# Patient Record
Sex: MALE | Race: WHITE | Employment: FULL TIME | URBAN - METROPOLITAN AREA
[De-identification: names, ages, dates, MRNs, and addresses within clinical notes are randomized per-mention and may not be internally consistent; named-entity substitution may affect disease eponyms.]

---

## 2017-07-22 ENCOUNTER — APPOINTMENT (OUTPATIENT)
Dept: CARDIOLOGY | Facility: CLINIC | Age: 57
DRG: 247 | End: 2017-07-22
Attending: INTERNAL MEDICINE
Payer: COMMERCIAL

## 2017-07-22 ENCOUNTER — HOSPITAL ENCOUNTER (INPATIENT)
Facility: CLINIC | Age: 57
LOS: 1 days | Discharge: HOME | DRG: 247 | End: 2017-07-23
Attending: INTERNAL MEDICINE | Admitting: INTERNAL MEDICINE
Payer: COMMERCIAL

## 2017-07-22 ENCOUNTER — HOSPITAL ENCOUNTER (EMERGENCY)
Facility: CLINIC | Age: 57
Discharge: OTHER INSTITUTION | End: 2017-07-22
Attending: EMERGENCY MEDICINE | Admitting: EMERGENCY MEDICINE
Payer: COMMERCIAL

## 2017-07-22 ENCOUNTER — APPOINTMENT (OUTPATIENT)
Dept: GENERAL RADIOLOGY | Facility: CLINIC | Age: 57
End: 2017-07-22
Attending: EMERGENCY MEDICINE
Payer: COMMERCIAL

## 2017-07-22 VITALS
HEART RATE: 71 BPM | OXYGEN SATURATION: 98 % | SYSTOLIC BLOOD PRESSURE: 149 MMHG | HEIGHT: 74 IN | WEIGHT: 230 LBS | TEMPERATURE: 98.3 F | RESPIRATION RATE: 15 BRPM | DIASTOLIC BLOOD PRESSURE: 102 MMHG | BODY MASS INDEX: 29.52 KG/M2

## 2017-07-22 DIAGNOSIS — I21.3 ST ELEVATION MYOCARDIAL INFARCTION (STEMI), UNSPECIFIED ARTERY (H): ICD-10-CM

## 2017-07-22 DIAGNOSIS — I21.02 ST ELEVATION MYOCARDIAL INFARCTION INVOLVING LEFT ANTERIOR DESCENDING (LAD) CORONARY ARTERY (H): Primary | ICD-10-CM

## 2017-07-22 LAB
ANION GAP SERPL CALCULATED.3IONS-SCNC: 6 MMOL/L (ref 3–14)
APTT PPP: 31 SEC (ref 22–37)
BASOPHILS # BLD AUTO: 0.1 10E9/L (ref 0–0.2)
BASOPHILS NFR BLD AUTO: 1.1 %
BUN SERPL-MCNC: 18 MG/DL (ref 7–30)
CALCIUM SERPL-MCNC: 8.9 MG/DL (ref 8.5–10.1)
CHLORIDE SERPL-SCNC: 106 MMOL/L (ref 94–109)
CHOLEST SERPL-MCNC: 183 MG/DL
CO2 SERPL-SCNC: 26 MMOL/L (ref 20–32)
CREAT SERPL-MCNC: 0.97 MG/DL (ref 0.66–1.25)
DIFFERENTIAL METHOD BLD: ABNORMAL
EOSINOPHIL # BLD AUTO: 0.3 10E9/L (ref 0–0.7)
EOSINOPHIL NFR BLD AUTO: 4.1 %
ERYTHROCYTE [DISTWIDTH] IN BLOOD BY AUTOMATED COUNT: 12.2 % (ref 10–15)
GFR SERPL CREATININE-BSD FRML MDRD: 80 ML/MIN/1.7M2
GLUCOSE SERPL-MCNC: 133 MG/DL (ref 70–99)
HCT VFR BLD AUTO: 50.9 % (ref 40–53)
HDLC SERPL-MCNC: 34 MG/DL
HGB BLD-MCNC: 17.7 G/DL (ref 13.3–17.7)
IMM GRANULOCYTES # BLD: 0 10E9/L (ref 0–0.4)
IMM GRANULOCYTES NFR BLD: 0.3 %
INR PPP: 0.98 (ref 0.86–1.14)
LDLC SERPL CALC-MCNC: 102 MG/DL
LYMPHOCYTES # BLD AUTO: 3 10E9/L (ref 0.8–5.3)
LYMPHOCYTES NFR BLD AUTO: 45 %
MCH RBC QN AUTO: 33.6 PG (ref 26.5–33)
MCHC RBC AUTO-ENTMCNC: 34.8 G/DL (ref 31.5–36.5)
MCV RBC AUTO: 97 FL (ref 78–100)
MONOCYTES # BLD AUTO: 0.8 10E9/L (ref 0–1.3)
MONOCYTES NFR BLD AUTO: 11.7 %
NEUTROPHILS # BLD AUTO: 2.5 10E9/L (ref 1.6–8.3)
NEUTROPHILS NFR BLD AUTO: 37.8 %
NONHDLC SERPL-MCNC: 149 MG/DL
NRBC # BLD AUTO: 0 10*3/UL
NRBC BLD AUTO-RTO: 0 /100
PLATELET # BLD AUTO: 213 10E9/L (ref 150–450)
POTASSIUM SERPL-SCNC: 3.9 MMOL/L (ref 3.4–5.3)
RBC # BLD AUTO: 5.27 10E12/L (ref 4.4–5.9)
SODIUM SERPL-SCNC: 138 MMOL/L (ref 133–144)
TRIGL SERPL-MCNC: 235 MG/DL
TROPONIN I BLD-MCNC: 0.05 UG/L (ref 0–0.1)
TROPONIN I SERPL-MCNC: 0.06 UG/L (ref 0–0.04)
TROPONIN I SERPL-MCNC: 0.33 UG/L (ref 0–0.04)
TROPONIN I SERPL-MCNC: 1.07 UG/L (ref 0–0.04)
WBC # BLD AUTO: 6.6 10E9/L (ref 4–11)

## 2017-07-22 PROCEDURE — 93005 ELECTROCARDIOGRAM TRACING: CPT

## 2017-07-22 PROCEDURE — HZ2ZZZZ DETOXIFICATION SERVICES FOR SUBSTANCE ABUSE TREATMENT: ICD-10-PCS | Performed by: INTERNAL MEDICINE

## 2017-07-22 PROCEDURE — 84484 ASSAY OF TROPONIN QUANT: CPT

## 2017-07-22 PROCEDURE — 27210787 ZZH MANIFOLD CR2

## 2017-07-22 PROCEDURE — 93306 TTE W/DOPPLER COMPLETE: CPT | Mod: 26 | Performed by: INTERNAL MEDICINE

## 2017-07-22 PROCEDURE — 99152 MOD SED SAME PHYS/QHP 5/>YRS: CPT | Mod: 59 | Performed by: INTERNAL MEDICINE

## 2017-07-22 PROCEDURE — 27210795 ZZH PAD DEFIB QUICK CR4

## 2017-07-22 PROCEDURE — 99291 CRITICAL CARE FIRST HOUR: CPT | Mod: 25 | Performed by: INTERNAL MEDICINE

## 2017-07-22 PROCEDURE — 27211089 ZZH KIT ACIST INJECTOR CR3

## 2017-07-22 PROCEDURE — C1769 GUIDE WIRE: HCPCS

## 2017-07-22 PROCEDURE — 80048 BASIC METABOLIC PNL TOTAL CA: CPT | Performed by: EMERGENCY MEDICINE

## 2017-07-22 PROCEDURE — 27210914 ZZH SHEATH CR8

## 2017-07-22 PROCEDURE — 36415 COLL VENOUS BLD VENIPUNCTURE: CPT

## 2017-07-22 PROCEDURE — 99207 ZZC CONSULT E&M CHANGED TO INITIAL LEVEL: CPT | Performed by: INTERNAL MEDICINE

## 2017-07-22 PROCEDURE — 93458 L HRT ARTERY/VENTRICLE ANGIO: CPT

## 2017-07-22 PROCEDURE — 80061 LIPID PANEL: CPT

## 2017-07-22 PROCEDURE — 93458 L HRT ARTERY/VENTRICLE ANGIO: CPT | Mod: 26 | Performed by: INTERNAL MEDICINE

## 2017-07-22 PROCEDURE — 25000132 ZZH RX MED GY IP 250 OP 250 PS 637: Performed by: INTERNAL MEDICINE

## 2017-07-22 PROCEDURE — 85610 PROTHROMBIN TIME: CPT | Performed by: EMERGENCY MEDICINE

## 2017-07-22 PROCEDURE — 25000128 H RX IP 250 OP 636: Performed by: INTERNAL MEDICINE

## 2017-07-22 PROCEDURE — C1874 STENT, COATED/COV W/DEL SYS: HCPCS

## 2017-07-22 PROCEDURE — 21000000 ZZH R&B IMCU HEART CARE

## 2017-07-22 PROCEDURE — 84484 ASSAY OF TROPONIN QUANT: CPT | Performed by: EMERGENCY MEDICINE

## 2017-07-22 PROCEDURE — 99285 EMERGENCY DEPT VISIT HI MDM: CPT

## 2017-07-22 PROCEDURE — 85730 THROMBOPLASTIN TIME PARTIAL: CPT | Performed by: EMERGENCY MEDICINE

## 2017-07-22 PROCEDURE — C9606 PERC D-E COR REVASC W AMI S: HCPCS | Mod: LD

## 2017-07-22 PROCEDURE — 92941 PRQ TRLML REVSC TOT OCCL AMI: CPT | Mod: LD | Performed by: INTERNAL MEDICINE

## 2017-07-22 PROCEDURE — 99153 MOD SED SAME PHYS/QHP EA: CPT

## 2017-07-22 PROCEDURE — 93010 ELECTROCARDIOGRAM REPORT: CPT | Performed by: INTERNAL MEDICINE

## 2017-07-22 PROCEDURE — 27210946 ZZH KIT HC TOTES DISP CR8

## 2017-07-22 PROCEDURE — 36415 COLL VENOUS BLD VENIPUNCTURE: CPT | Performed by: INTERNAL MEDICINE

## 2017-07-22 PROCEDURE — 25000128 H RX IP 250 OP 636

## 2017-07-22 PROCEDURE — 84484 ASSAY OF TROPONIN QUANT: CPT | Performed by: INTERNAL MEDICINE

## 2017-07-22 PROCEDURE — 27210856 ZZH ACCESS HEART CATH CR2

## 2017-07-22 PROCEDURE — 25000125 ZZHC RX 250: Performed by: INTERNAL MEDICINE

## 2017-07-22 PROCEDURE — 4A023N7 MEASUREMENT OF CARDIAC SAMPLING AND PRESSURE, LEFT HEART, PERCUTANEOUS APPROACH: ICD-10-PCS | Performed by: INTERNAL MEDICINE

## 2017-07-22 PROCEDURE — 71010 XR CHEST PORT 1 VW: CPT

## 2017-07-22 PROCEDURE — 25000128 H RX IP 250 OP 636: Performed by: EMERGENCY MEDICINE

## 2017-07-22 PROCEDURE — 99222 1ST HOSP IP/OBS MODERATE 55: CPT | Performed by: INTERNAL MEDICINE

## 2017-07-22 PROCEDURE — 96375 TX/PRO/DX INJ NEW DRUG ADDON: CPT

## 2017-07-22 PROCEDURE — C1725 CATH, TRANSLUMIN NON-LASER: HCPCS

## 2017-07-22 PROCEDURE — 25000132 ZZH RX MED GY IP 250 OP 250 PS 637

## 2017-07-22 PROCEDURE — 25500064 ZZH RX 255 OP 636: Performed by: INTERNAL MEDICINE

## 2017-07-22 PROCEDURE — 99152 MOD SED SAME PHYS/QHP 5/>YRS: CPT

## 2017-07-22 PROCEDURE — C1887 CATHETER, GUIDING: HCPCS

## 2017-07-22 PROCEDURE — 93306 TTE W/DOPPLER COMPLETE: CPT

## 2017-07-22 PROCEDURE — 27210759 ZZH DEVICE INFLATION CR6

## 2017-07-22 PROCEDURE — 85025 COMPLETE CBC W/AUTO DIFF WBC: CPT | Performed by: EMERGENCY MEDICINE

## 2017-07-22 PROCEDURE — B2151ZZ FLUOROSCOPY OF LEFT HEART USING LOW OSMOLAR CONTRAST: ICD-10-PCS | Performed by: INTERNAL MEDICINE

## 2017-07-22 PROCEDURE — 027034Z DILATION OF CORONARY ARTERY, ONE ARTERY WITH DRUG-ELUTING INTRALUMINAL DEVICE, PERCUTANEOUS APPROACH: ICD-10-PCS | Performed by: INTERNAL MEDICINE

## 2017-07-22 PROCEDURE — 96374 THER/PROPH/DIAG INJ IV PUSH: CPT

## 2017-07-22 PROCEDURE — 25000132 ZZH RX MED GY IP 250 OP 250 PS 637: Performed by: EMERGENCY MEDICINE

## 2017-07-22 PROCEDURE — B2111ZZ FLUOROSCOPY OF MULTIPLE CORONARY ARTERIES USING LOW OSMOLAR CONTRAST: ICD-10-PCS | Performed by: INTERNAL MEDICINE

## 2017-07-22 RX ORDER — ASPIRIN 81 MG/1
324 TABLET, CHEWABLE ORAL ONCE
Status: COMPLETED | OUTPATIENT
Start: 2017-07-22 | End: 2017-07-22

## 2017-07-22 RX ORDER — NITROGLYCERIN 5 MG/ML
100-200 VIAL (ML) INTRAVENOUS
Status: DISCONTINUED | OUTPATIENT
Start: 2017-07-22 | End: 2017-07-22 | Stop reason: HOSPADM

## 2017-07-22 RX ORDER — FENTANYL CITRATE 50 UG/ML
25-50 INJECTION, SOLUTION INTRAMUSCULAR; INTRAVENOUS
Status: ACTIVE | OUTPATIENT
Start: 2017-07-22 | End: 2017-07-22

## 2017-07-22 RX ORDER — POTASSIUM CHLORIDE 29.8 MG/ML
20 INJECTION INTRAVENOUS
Status: DISCONTINUED | OUTPATIENT
Start: 2017-07-22 | End: 2017-07-23 | Stop reason: HOSPADM

## 2017-07-22 RX ORDER — ONDANSETRON 2 MG/ML
4 INJECTION INTRAMUSCULAR; INTRAVENOUS EVERY 4 HOURS PRN
Status: DISCONTINUED | OUTPATIENT
Start: 2017-07-22 | End: 2017-07-22 | Stop reason: HOSPADM

## 2017-07-22 RX ORDER — LORAZEPAM 2 MG/ML
.5-2 INJECTION INTRAMUSCULAR EVERY 4 HOURS PRN
Status: DISCONTINUED | OUTPATIENT
Start: 2017-07-22 | End: 2017-07-22 | Stop reason: HOSPADM

## 2017-07-22 RX ORDER — PROTAMINE SULFATE 10 MG/ML
25-100 INJECTION, SOLUTION INTRAVENOUS EVERY 5 MIN PRN
Status: DISCONTINUED | OUTPATIENT
Start: 2017-07-22 | End: 2017-07-22 | Stop reason: HOSPADM

## 2017-07-22 RX ORDER — LANOLIN ALCOHOL/MO/W.PET/CERES
100 CREAM (GRAM) TOPICAL DAILY
Status: DISCONTINUED | OUTPATIENT
Start: 2017-07-22 | End: 2017-07-23 | Stop reason: HOSPADM

## 2017-07-22 RX ORDER — POTASSIUM CHLORIDE 1500 MG/1
20-40 TABLET, EXTENDED RELEASE ORAL
Status: DISCONTINUED | OUTPATIENT
Start: 2017-07-22 | End: 2017-07-23 | Stop reason: HOSPADM

## 2017-07-22 RX ORDER — ASPIRIN 81 MG/1
81 TABLET ORAL DAILY
Status: DISCONTINUED | OUTPATIENT
Start: 2017-07-23 | End: 2017-07-23 | Stop reason: HOSPADM

## 2017-07-22 RX ORDER — CLOPIDOGREL BISULFATE 75 MG/1
300-600 TABLET ORAL
Status: DISCONTINUED | OUTPATIENT
Start: 2017-07-22 | End: 2017-07-22 | Stop reason: HOSPADM

## 2017-07-22 RX ORDER — METOPROLOL TARTRATE 1 MG/ML
5 INJECTION, SOLUTION INTRAVENOUS EVERY 5 MIN PRN
Status: DISCONTINUED | OUTPATIENT
Start: 2017-07-22 | End: 2017-07-22 | Stop reason: HOSPADM

## 2017-07-22 RX ORDER — PROMETHAZINE HYDROCHLORIDE 25 MG/ML
6.25-25 INJECTION, SOLUTION INTRAMUSCULAR; INTRAVENOUS EVERY 4 HOURS PRN
Status: DISCONTINUED | OUTPATIENT
Start: 2017-07-22 | End: 2017-07-22 | Stop reason: HOSPADM

## 2017-07-22 RX ORDER — LORAZEPAM 2 MG/ML
1-2 INJECTION INTRAMUSCULAR EVERY 30 MIN PRN
Status: DISCONTINUED | OUTPATIENT
Start: 2017-07-22 | End: 2017-07-23 | Stop reason: HOSPADM

## 2017-07-22 RX ORDER — IOPAMIDOL 755 MG/ML
180 INJECTION, SOLUTION INTRAVASCULAR ONCE
Status: COMPLETED | OUTPATIENT
Start: 2017-07-22 | End: 2017-07-22

## 2017-07-22 RX ORDER — NIFEDIPINE 10 MG/1
10 CAPSULE ORAL
Status: DISCONTINUED | OUTPATIENT
Start: 2017-07-22 | End: 2017-07-22 | Stop reason: HOSPADM

## 2017-07-22 RX ORDER — METHYLPREDNISOLONE SODIUM SUCCINATE 125 MG/2ML
125 INJECTION, POWDER, LYOPHILIZED, FOR SOLUTION INTRAMUSCULAR; INTRAVENOUS
Status: DISCONTINUED | OUTPATIENT
Start: 2017-07-22 | End: 2017-07-22 | Stop reason: HOSPADM

## 2017-07-22 RX ORDER — QUETIAPINE FUMARATE 25 MG/1
25-50 TABLET, FILM COATED ORAL EVERY 6 HOURS PRN
Status: DISCONTINUED | OUTPATIENT
Start: 2017-07-22 | End: 2017-07-23 | Stop reason: HOSPADM

## 2017-07-22 RX ORDER — ACETAMINOPHEN 325 MG/1
325-650 TABLET ORAL EVERY 4 HOURS PRN
Status: DISCONTINUED | OUTPATIENT
Start: 2017-07-22 | End: 2017-07-23 | Stop reason: HOSPADM

## 2017-07-22 RX ORDER — NITROGLYCERIN 0.4 MG/1
0.4 TABLET SUBLINGUAL EVERY 5 MIN PRN
Status: DISCONTINUED | OUTPATIENT
Start: 2017-07-22 | End: 2017-07-22 | Stop reason: HOSPADM

## 2017-07-22 RX ORDER — NITROGLYCERIN 0.4 MG/1
0.4 TABLET SUBLINGUAL EVERY 5 MIN PRN
Status: DISCONTINUED | OUTPATIENT
Start: 2017-07-22 | End: 2017-07-23 | Stop reason: HOSPADM

## 2017-07-22 RX ORDER — POTASSIUM CHLORIDE 7.45 MG/ML
10 INJECTION INTRAVENOUS
Status: DISCONTINUED | OUTPATIENT
Start: 2017-07-22 | End: 2017-07-23 | Stop reason: HOSPADM

## 2017-07-22 RX ORDER — FLUMAZENIL 0.1 MG/ML
0.2 INJECTION, SOLUTION INTRAVENOUS
Status: ACTIVE | OUTPATIENT
Start: 2017-07-22 | End: 2017-07-22

## 2017-07-22 RX ORDER — HYDRALAZINE HYDROCHLORIDE 20 MG/ML
10-20 INJECTION INTRAMUSCULAR; INTRAVENOUS
Status: DISCONTINUED | OUTPATIENT
Start: 2017-07-22 | End: 2017-07-22 | Stop reason: HOSPADM

## 2017-07-22 RX ORDER — LIDOCAINE 40 MG/G
CREAM TOPICAL
Status: DISCONTINUED | OUTPATIENT
Start: 2017-07-22 | End: 2017-07-23 | Stop reason: HOSPADM

## 2017-07-22 RX ORDER — PROTAMINE SULFATE 10 MG/ML
1-5 INJECTION, SOLUTION INTRAVENOUS
Status: DISCONTINUED | OUTPATIENT
Start: 2017-07-22 | End: 2017-07-22 | Stop reason: HOSPADM

## 2017-07-22 RX ORDER — ATORVASTATIN CALCIUM 80 MG/1
80 TABLET, FILM COATED ORAL AT BEDTIME
Status: DISCONTINUED | OUTPATIENT
Start: 2017-07-22 | End: 2017-07-23 | Stop reason: HOSPADM

## 2017-07-22 RX ORDER — METOPROLOL TARTRATE 25 MG/1
25 TABLET, FILM COATED ORAL 2 TIMES DAILY
Status: DISCONTINUED | OUTPATIENT
Start: 2017-07-22 | End: 2017-07-23 | Stop reason: HOSPADM

## 2017-07-22 RX ORDER — SODIUM CHLORIDE 9 MG/ML
INJECTION, SOLUTION INTRAVENOUS CONTINUOUS
Status: ACTIVE | OUTPATIENT
Start: 2017-07-22 | End: 2017-07-22

## 2017-07-22 RX ORDER — FENTANYL CITRATE 50 UG/ML
25-50 INJECTION, SOLUTION INTRAMUSCULAR; INTRAVENOUS
Status: DISCONTINUED | OUTPATIENT
Start: 2017-07-22 | End: 2017-07-22 | Stop reason: HOSPADM

## 2017-07-22 RX ORDER — POTASSIUM CHLORIDE 29.8 MG/ML
20 INJECTION INTRAVENOUS
Status: DISCONTINUED | OUTPATIENT
Start: 2017-07-22 | End: 2017-07-22 | Stop reason: HOSPADM

## 2017-07-22 RX ORDER — ATROPINE SULFATE 0.1 MG/ML
0.5 INJECTION INTRAVENOUS EVERY 5 MIN PRN
Status: ACTIVE | OUTPATIENT
Start: 2017-07-22 | End: 2017-07-22

## 2017-07-22 RX ORDER — NICARDIPINE HYDROCHLORIDE 2.5 MG/ML
100 INJECTION INTRAVENOUS
Status: DISCONTINUED | OUTPATIENT
Start: 2017-07-22 | End: 2017-07-22 | Stop reason: HOSPADM

## 2017-07-22 RX ORDER — LORAZEPAM 1 MG/1
1-2 TABLET ORAL EVERY 30 MIN PRN
Status: DISCONTINUED | OUTPATIENT
Start: 2017-07-22 | End: 2017-07-23 | Stop reason: HOSPADM

## 2017-07-22 RX ORDER — ATROPINE SULFATE 0.1 MG/ML
.5-1 INJECTION INTRAVENOUS
Status: DISCONTINUED | OUTPATIENT
Start: 2017-07-22 | End: 2017-07-22 | Stop reason: HOSPADM

## 2017-07-22 RX ORDER — HEPARIN SODIUM 1000 [USP'U]/ML
1000-10000 INJECTION, SOLUTION INTRAVENOUS; SUBCUTANEOUS EVERY 5 MIN PRN
Status: DISCONTINUED | OUTPATIENT
Start: 2017-07-22 | End: 2017-07-22 | Stop reason: HOSPADM

## 2017-07-22 RX ORDER — ZOLPIDEM TARTRATE 5 MG/1
5 TABLET ORAL
Status: DISCONTINUED | OUTPATIENT
Start: 2017-07-22 | End: 2017-07-23 | Stop reason: HOSPADM

## 2017-07-22 RX ORDER — LIDOCAINE HYDROCHLORIDE 10 MG/ML
30 INJECTION, SOLUTION EPIDURAL; INFILTRATION; INTRACAUDAL; PERINEURAL
Status: DISCONTINUED | OUTPATIENT
Start: 2017-07-22 | End: 2017-07-22 | Stop reason: HOSPADM

## 2017-07-22 RX ORDER — MULTIPLE VITAMINS W/ MINERALS TAB 9MG-400MCG
1 TAB ORAL DAILY
Status: DISCONTINUED | OUTPATIENT
Start: 2017-07-22 | End: 2017-07-23 | Stop reason: HOSPADM

## 2017-07-22 RX ORDER — NITROGLYCERIN 20 MG/100ML
.07-2 INJECTION INTRAVENOUS CONTINUOUS PRN
Status: DISCONTINUED | OUTPATIENT
Start: 2017-07-22 | End: 2017-07-22 | Stop reason: HOSPADM

## 2017-07-22 RX ORDER — HYDROCODONE BITARTRATE AND ACETAMINOPHEN 5; 325 MG/1; MG/1
1-2 TABLET ORAL EVERY 4 HOURS PRN
Status: DISCONTINUED | OUTPATIENT
Start: 2017-07-22 | End: 2017-07-23 | Stop reason: HOSPADM

## 2017-07-22 RX ORDER — DEXTROSE MONOHYDRATE 25 G/50ML
12.5-5 INJECTION, SOLUTION INTRAVENOUS EVERY 30 MIN PRN
Status: DISCONTINUED | OUTPATIENT
Start: 2017-07-22 | End: 2017-07-22 | Stop reason: HOSPADM

## 2017-07-22 RX ORDER — PHENYLEPHRINE HCL IN 0.9% NACL 1 MG/10 ML
20-100 SYRINGE (ML) INTRAVENOUS
Status: DISCONTINUED | OUTPATIENT
Start: 2017-07-22 | End: 2017-07-22 | Stop reason: HOSPADM

## 2017-07-22 RX ORDER — FUROSEMIDE 10 MG/ML
20-100 INJECTION INTRAMUSCULAR; INTRAVENOUS
Status: DISCONTINUED | OUTPATIENT
Start: 2017-07-22 | End: 2017-07-22 | Stop reason: HOSPADM

## 2017-07-22 RX ORDER — POTASSIUM CHLORIDE 7.45 MG/ML
10 INJECTION INTRAVENOUS
Status: DISCONTINUED | OUTPATIENT
Start: 2017-07-22 | End: 2017-07-22 | Stop reason: HOSPADM

## 2017-07-22 RX ORDER — PRASUGREL 10 MG/1
10-60 TABLET, FILM COATED ORAL
Status: DISCONTINUED | OUTPATIENT
Start: 2017-07-22 | End: 2017-07-22 | Stop reason: HOSPADM

## 2017-07-22 RX ORDER — EPTIFIBATIDE 2 MG/ML
180 INJECTION, SOLUTION INTRAVENOUS EVERY 10 MIN PRN
Status: DISCONTINUED | OUTPATIENT
Start: 2017-07-22 | End: 2017-07-22 | Stop reason: HOSPADM

## 2017-07-22 RX ORDER — ASPIRIN 81 MG/1
81-324 TABLET, CHEWABLE ORAL
Status: DISCONTINUED | OUTPATIENT
Start: 2017-07-22 | End: 2017-07-22 | Stop reason: HOSPADM

## 2017-07-22 RX ORDER — NITROGLYCERIN 5 MG/ML
100-500 VIAL (ML) INTRAVENOUS
Status: COMPLETED | OUTPATIENT
Start: 2017-07-22 | End: 2017-07-22

## 2017-07-22 RX ORDER — POTASSIUM CHLORIDE 1.5 G/1.58G
20-40 POWDER, FOR SOLUTION ORAL
Status: DISCONTINUED | OUTPATIENT
Start: 2017-07-22 | End: 2017-07-23 | Stop reason: HOSPADM

## 2017-07-22 RX ORDER — POTASSIUM CL/LIDO/0.9 % NACL 10MEQ/0.1L
10 INTRAVENOUS SOLUTION, PIGGYBACK (ML) INTRAVENOUS
Status: DISCONTINUED | OUTPATIENT
Start: 2017-07-22 | End: 2017-07-23 | Stop reason: HOSPADM

## 2017-07-22 RX ORDER — FOLIC ACID 1 MG/1
1 TABLET ORAL DAILY
Status: DISCONTINUED | OUTPATIENT
Start: 2017-07-22 | End: 2017-07-23 | Stop reason: HOSPADM

## 2017-07-22 RX ORDER — NALOXONE HYDROCHLORIDE 0.4 MG/ML
.1-.4 INJECTION, SOLUTION INTRAMUSCULAR; INTRAVENOUS; SUBCUTANEOUS
Status: DISCONTINUED | OUTPATIENT
Start: 2017-07-22 | End: 2017-07-23 | Stop reason: HOSPADM

## 2017-07-22 RX ORDER — EPTIFIBATIDE 2 MG/ML
1 INJECTION, SOLUTION INTRAVENOUS CONTINUOUS PRN
Status: DISCONTINUED | OUTPATIENT
Start: 2017-07-22 | End: 2017-07-22 | Stop reason: HOSPADM

## 2017-07-22 RX ORDER — NALOXONE HYDROCHLORIDE 0.4 MG/ML
.2-.4 INJECTION, SOLUTION INTRAMUSCULAR; INTRAVENOUS; SUBCUTANEOUS
Status: ACTIVE | OUTPATIENT
Start: 2017-07-22 | End: 2017-07-22

## 2017-07-22 RX ORDER — ADENOSINE 3 MG/ML
12-12000 INJECTION, SOLUTION INTRAVENOUS
Status: DISCONTINUED | OUTPATIENT
Start: 2017-07-22 | End: 2017-07-22 | Stop reason: HOSPADM

## 2017-07-22 RX ORDER — ENALAPRILAT 1.25 MG/ML
1.25-2.5 INJECTION INTRAVENOUS
Status: DISCONTINUED | OUTPATIENT
Start: 2017-07-22 | End: 2017-07-22 | Stop reason: HOSPADM

## 2017-07-22 RX ORDER — NITROGLYCERIN 20 MG/100ML
.07-2 INJECTION INTRAVENOUS CONTINUOUS
Status: DISCONTINUED | OUTPATIENT
Start: 2017-07-22 | End: 2017-07-22 | Stop reason: HOSPADM

## 2017-07-22 RX ORDER — DIPHENHYDRAMINE HYDROCHLORIDE 50 MG/ML
25-50 INJECTION INTRAMUSCULAR; INTRAVENOUS
Status: DISCONTINUED | OUTPATIENT
Start: 2017-07-22 | End: 2017-07-22 | Stop reason: HOSPADM

## 2017-07-22 RX ORDER — CLOPIDOGREL BISULFATE 75 MG/1
75 TABLET ORAL
Status: DISCONTINUED | OUTPATIENT
Start: 2017-07-22 | End: 2017-07-22 | Stop reason: HOSPADM

## 2017-07-22 RX ORDER — NITROGLYCERIN 20 MG/100ML
INJECTION INTRAVENOUS
Status: COMPLETED
Start: 2017-07-22 | End: 2017-07-22

## 2017-07-22 RX ORDER — SODIUM NITROPRUSSIDE 25 MG/ML
100-200 INJECTION INTRAVENOUS
Status: DISCONTINUED | OUTPATIENT
Start: 2017-07-22 | End: 2017-07-22 | Stop reason: HOSPADM

## 2017-07-22 RX ORDER — FLUMAZENIL 0.1 MG/ML
0.2 INJECTION, SOLUTION INTRAVENOUS
Status: DISCONTINUED | OUTPATIENT
Start: 2017-07-22 | End: 2017-07-22 | Stop reason: HOSPADM

## 2017-07-22 RX ORDER — NALOXONE HYDROCHLORIDE 0.4 MG/ML
0.4 INJECTION, SOLUTION INTRAMUSCULAR; INTRAVENOUS; SUBCUTANEOUS EVERY 5 MIN PRN
Status: DISCONTINUED | OUTPATIENT
Start: 2017-07-22 | End: 2017-07-22 | Stop reason: HOSPADM

## 2017-07-22 RX ORDER — DOPAMINE HYDROCHLORIDE 160 MG/100ML
2-20 INJECTION, SOLUTION INTRAVENOUS CONTINUOUS PRN
Status: DISCONTINUED | OUTPATIENT
Start: 2017-07-22 | End: 2017-07-22 | Stop reason: HOSPADM

## 2017-07-22 RX ORDER — LIDOCAINE HYDROCHLORIDE 10 MG/ML
1-10 INJECTION, SOLUTION EPIDURAL; INFILTRATION; INTRACAUDAL; PERINEURAL
Status: COMPLETED | OUTPATIENT
Start: 2017-07-22 | End: 2017-07-22

## 2017-07-22 RX ORDER — LISINOPRIL 5 MG/1
5 TABLET ORAL DAILY
Status: DISCONTINUED | OUTPATIENT
Start: 2017-07-22 | End: 2017-07-23 | Stop reason: HOSPADM

## 2017-07-22 RX ORDER — VERAPAMIL HYDROCHLORIDE 2.5 MG/ML
1-2.5 INJECTION, SOLUTION INTRAVENOUS
Status: COMPLETED | OUTPATIENT
Start: 2017-07-22 | End: 2017-07-22

## 2017-07-22 RX ORDER — DOBUTAMINE HYDROCHLORIDE 200 MG/100ML
2-20 INJECTION INTRAVENOUS CONTINUOUS PRN
Status: DISCONTINUED | OUTPATIENT
Start: 2017-07-22 | End: 2017-07-22 | Stop reason: HOSPADM

## 2017-07-22 RX ADMIN — FENTANYL CITRATE 50 MCG: 50 INJECTION, SOLUTION INTRAMUSCULAR; INTRAVENOUS at 08:10

## 2017-07-22 RX ADMIN — MIDAZOLAM HYDROCHLORIDE 1 MG: 1 INJECTION, SOLUTION INTRAMUSCULAR; INTRAVENOUS at 08:16

## 2017-07-22 RX ADMIN — ASPIRIN 81 MG 324 MG: 81 TABLET ORAL at 07:23

## 2017-07-22 RX ADMIN — FENTANYL CITRATE 50 MCG: 50 INJECTION, SOLUTION INTRAMUSCULAR; INTRAVENOUS at 08:24

## 2017-07-22 RX ADMIN — LIDOCAINE HYDROCHLORIDE 10 MG: 10 INJECTION, SOLUTION EPIDURAL; INFILTRATION; INTRACAUDAL; PERINEURAL at 08:16

## 2017-07-22 RX ADMIN — HEPARIN SODIUM 7500 UNITS: 1000 INJECTION, SOLUTION INTRAVENOUS; SUBCUTANEOUS at 07:35

## 2017-07-22 RX ADMIN — SULFUR HEXAFLUORIDE 5 ML: KIT at 11:21

## 2017-07-22 RX ADMIN — NITROGLYCERIN 200 MCG: 5 INJECTION, SOLUTION INTRAVENOUS at 08:17

## 2017-07-22 RX ADMIN — IOPAMIDOL 180 ML: 755 INJECTION, SOLUTION INTRAVASCULAR at 16:15

## 2017-07-22 RX ADMIN — LISINOPRIL 5 MG: 5 TABLET ORAL at 12:47

## 2017-07-22 RX ADMIN — ATORVASTATIN CALCIUM 80 MG: 80 TABLET, FILM COATED ORAL at 20:19

## 2017-07-22 RX ADMIN — METOPROLOL TARTRATE 25 MG: 25 TABLET ORAL at 20:07

## 2017-07-22 RX ADMIN — BIVALIRUDIN 1.75 MG/KG/HR: 250 INJECTION, POWDER, LYOPHILIZED, FOR SOLUTION INTRAVENOUS at 08:23

## 2017-07-22 RX ADMIN — MIDAZOLAM HYDROCHLORIDE 1 MG: 1 INJECTION, SOLUTION INTRAMUSCULAR; INTRAVENOUS at 08:24

## 2017-07-22 RX ADMIN — NITROGLYCERIN 0.4 MG: 0.4 TABLET SUBLINGUAL at 07:22

## 2017-07-22 RX ADMIN — NITROGLYCERIN 50000 MCG: 20 INJECTION INTRAVENOUS at 07:30

## 2017-07-22 RX ADMIN — NITROGLYCERIN 200 MCG: 5 INJECTION, SOLUTION INTRAVENOUS at 08:40

## 2017-07-22 RX ADMIN — TICAGRELOR 180 MG: 90 TABLET ORAL at 07:26

## 2017-07-22 RX ADMIN — MIDAZOLAM HYDROCHLORIDE 1 MG: 1 INJECTION, SOLUTION INTRAMUSCULAR; INTRAVENOUS at 08:10

## 2017-07-22 RX ADMIN — TICAGRELOR 90 MG: 90 TABLET ORAL at 20:07

## 2017-07-22 RX ADMIN — METOPROLOL TARTRATE 25 MG: 25 TABLET ORAL at 12:47

## 2017-07-22 RX ADMIN — FENTANYL CITRATE 50 MCG: 50 INJECTION, SOLUTION INTRAMUSCULAR; INTRAVENOUS at 08:20

## 2017-07-22 RX ADMIN — Medication 78.2 MG: at 08:23

## 2017-07-22 RX ADMIN — VERAPAMIL HYDROCHLORIDE 2.5 MG: 2.5 INJECTION, SOLUTION INTRAVENOUS at 08:17

## 2017-07-22 RX ADMIN — FENTANYL CITRATE 50 MCG: 50 INJECTION, SOLUTION INTRAMUSCULAR; INTRAVENOUS at 08:16

## 2017-07-22 RX ADMIN — NITROGLYCERIN 200 MCG: 5 INJECTION, SOLUTION INTRAVENOUS at 08:28

## 2017-07-22 RX ADMIN — ZOLPIDEM TARTRATE 5 MG: 5 TABLET, FILM COATED ORAL at 20:19

## 2017-07-22 RX ADMIN — FOLIC ACID 1 MG: 1 TABLET ORAL at 12:47

## 2017-07-22 RX ADMIN — Medication 100 MG: at 12:47

## 2017-07-22 RX ADMIN — NITROGLYCERIN 0.4 MG: 0.4 TABLET SUBLINGUAL at 07:27

## 2017-07-22 RX ADMIN — MULTIPLE VITAMINS W/ MINERALS TAB 1 TABLET: TAB at 12:46

## 2017-07-22 ASSESSMENT — ENCOUNTER SYMPTOMS: SHORTNESS OF BREATH: 1

## 2017-07-22 NOTE — PHARMACY-ADMISSION MEDICATION HISTORY
Admission medication history interview status for the 7/22/2017  admission is complete. See EPIC admission navigator for prior to admission medications     Medication history source reliability:Good    Actions taken by pharmacist (provider contacted, etc): interview with patient.     Additional medication history information not noted on PTA med list :None    Medication reconciliation/reorder completed by provider prior to medication history? No    Time spent in this activity: 10 min    Prior to Admission medications    Medication Sig Last Dose Taking? Auth Provider   Omeprazole Magnesium (PRILOSEC OTC PO) Take 20 mg by mouth daily as needed prn Yes Unknown, Entered By History   DiphenhydrAMINE HCl, Sleep, (SIMPLY SLEEP PO) Take 25 mg by mouth nightly as needed  prn  Reported, Patient

## 2017-07-22 NOTE — PROCEDURES
PROCEDURES PERFORMED:   Coronary Angiography  Percutaneous Coronary Intervention    PHYSICIANS:  Attending Physician: Brandon Hurley MD  Interventional Cardiology Fellow: OSMIN Monzon MD, PhD    INDICATION:  Mr. Tipton is a 57 year old male with hx of tobacco use, HLD, MI in father at age 70 and vague hx of alcohol use. He presents today with STEMI, transferred from [facility]. Patient received ASA, Heparin and Brilinta at the [facility].    DESCRIPTION:  1. Consent obtained with discussion of risks.  All questions were answered.  2. Sterile prep and procedure.  3. Location with Sheaths:   Lf Radial Arterial  6 Fr slender  4. Access: Local anesthetic with lidocaine.  A micropuncture 21 guage needle with ultrasound guidance was used to establish vascular access using a modified Seldinger technique.  5. Catheters:   6 Fr  JR4 and EBU 3.5 guide  6. Estimated blood loss: < 25 ml    The procedure was performed under conscious sedation for 53 minutes from 0810 to 0903.  The patient was assessed immediately before the first sedation medication was administered.  Midazolam 3 mg and Fentanyl 200 mcg were administered.  Heart rate, BP, respiration, oxygen saturation and patient responses were monitored throughout the procedure with the assistance of the RN under my supervision.  IA Verapamil and IA NTG were administered to prophylax against radial artery spasm.    CORONARY ANGIOGRAM:   Both coronary arteries arise from their respective cusps.  Dominance: Right  The LM has no evidence of coronary artery disease.  LAD: Type 3 [LAD supplies the entire apex]. The LAD gives rise to septal perforators, D1 and D2. There is an acute subtotal, thrombotic occlusion of the mid LAD with CHAYA 0 flow distally. D2 has an ostial stenosis of about 60%  LCX gives rise to large branching OM, and two LPL branches. There is no evidence of disease.   RCA (dominant) gives rise to small PL branches and supplies PDA. There are mild luminal  irregularties in the mid RCA. Otherwise no evidence of disease.    PERCUTANEOUS CORONARY INTERVENTION:  Mid LAD  EBU 3.5 guide  Bivalirudin for anticoagulation    A Couger wire was advanced across the lesion and positioned in the apical LAD.  A 2.92q08ny Emerge balloon was used to pre-dilate the lesion which provided CHAYA 3 flow down the vessel.  A 3.0x38mm Promus Premier drug eluting stent was successfully deployed across the lesion with inflation to 11 nikki.  The stent was post-dilated with the stent balloon to 14 nikki.  Final angiography showed no evidence of perforation or dissection with residual stenosis of 0% and CHAYA 3 flow.  No complications.    Left Heart Catheterization:  AV crossed with pigtail catheter.   LV pressure 118/20  EF 60-65%  No wall motion abnormalties  No mitral regurgitation    Sheath Removal:  The LRA sheath was manually removed in the cardiac catheterization laboratory, TR band placed.    Contrast: Isovue, 180 ml     Fluoroscopy Time: 16 min    COMPLICATIONS:  1. None    SUMMARY:   Mid LAD STEMI with acute thrombotic lesion  Successfully opened with 3.0x38 CINDY    PLAN:   ASA 81mg daily and ticagrelor 90mg BID for at least one year  Initiated atorvastatin 80mg, metoprolol and lisinopril  Echo tomorrow  Bedrest per protocol.  Continued medical management and lifestyle modification for cardiovascular risk factor optimization.     See CVIS report for final draft.    OSMIN Monzon MD, PhD   Cardiology Fellow    I was present throughout the procedure and assisted as necessary.    Brandon Hurley MD [facility]

## 2017-07-22 NOTE — IP AVS SNAPSHOT
[facility]    [address]    [facility] [address]    Phone:  [phone]                                       After Visit Summary   7/22/2017    Bowen Tipton    MRN: [id number]           After Visit Summary Signature Page     I have received my discharge instructions, and my questions have been answered. I have discussed any challenges I see with this plan with the nurse or doctor.    ..........................................................................................................................................  Patient/Patient Representative Signature      ..........................................................................................................................................  Patient Representative Print Name and Relationship to Patient    ..................................................               ................................................  Date                                            Time    ..........................................................................................................................................  Reviewed by Signature/Title    ...................................................              ..............................................  Date                                                            Time

## 2017-07-22 NOTE — ED NOTES
"Patient presents to the ED reporting chest pressure that awoke him from sleep 30 minutes ago. States \"I feel really bad.\" Reports nausea along with midsternal chest pressure. Denies MI history, but reports a problem \"where the chambers of my heart weren't beating at the same time.\"   "

## 2017-07-22 NOTE — PLAN OF CARE
Problem: Goal Outcome Summary  Goal: Goal Outcome Summary  Outcome: No Change  Pt admitted this afternoon for STEMI post cath lab with stent placed to prox LAD. Lt radial remains CDI with no bleeding or hematoma noted. VSS. Tele shows SB. Pt denies any chest pain or SOB. Plan is for cardiac rehab tomorrow and possible D/C in 1-2 days. Bed alarm on. Will continue to monitor pt.

## 2017-07-22 NOTE — H&P
[facility]  Cardiology     Date of Admission:  7/22/2017    Primary Care Physician   Lisette Santos      History of Present Illness   Bowen Tipton is a 57 year old gentleman with past medical history significant for ongoing tobacco abuse, EtOH abuse, hypercholesterolemia, and a family history of coronary artery disease. This morning at approximately 645 he developed the onset of a substernal chest discomfort radiating into his left arm accompanied by diaphoresis shortness of breath and nausea. He immediately realized something was wrong and went to the emergency room. There EKG was consistent with an acute anterior wall ST segment elevation myocardial infarction. STEMI protocol was initiated. Patient has never had similar symptoms in the past.    Patient states he smokes an intermittent fashion some days no cigarettes some days one pack. He thinks he has hypercholesterolemia and is on no medications. He clearly has a very poor diet. He also is evasive about talked about how much alcohol he drinks he just answers to much. He drinks predominantly whiskey. His family history is significant cyst father dying suddenly just before 70 of birthday. He reports a sister having multiple stents in her 40s.    Assessment & Plan   Bowen Tipton is a 57 year old male who was admitted on 7/22/2017 with an anterior ST segment elevation myocardial infarction. Patient has been treated with aspirin, Brilinta, heparin. We'll proceed with emergent angioplasty and stenting as indicated. I quickly discussed risks benefits and alternatives the patient. He appears to understand and desires to proceed. I'll check serial troponins and echocardiogram in the morning.    I will send off a fasting lipid profile and empirically start him on atorvastatin 40 mg daily.    I will start beta blockers and Ace inhibitors as tolerated.    I will consult the hospitalist to help us manage and watch for possible alcohol withdrawal.  Next    Patient has been admonished to stop smoking cigarettes altogether.    I'll have cardiac rehabilitation see the patient.    Further evaluation treatment will depend upon the above results.    30 minutes of critical care time was spent in coordinating transfer from the [facility] emergency room. Also coordinating care with the cardiac intensive care unit and the hospitalist.        Brandon Hurley MD    Patient Active Problem List   Diagnosis     Hyperlipidemia with target LDL less than 130     Persistent insomnia       Past Medical History   I have reviewed this patient's medical history and updated it with pertinent information if needed.   No past medical history on file.    Past Surgical History   I have reviewed this patient's surgical history and updated it with pertinent information if needed.  No past surgical history on file.    Prior to Admission Medications   Prior to Admission Medications   Prescriptions Last Dose Informant Patient Reported? Taking?   DiphenhydrAMINE HCl, Sleep, (SIMPLY SLEEP PO)   Yes No      Facility-Administered Medications: None         Allergies   Allergies   Allergen Reactions     Codeine      reaction       Social History    reports that he has been smoking Cigarettes.  He has never used smokeless tobacco. He reports that he drinks alcohol. He reports that he does not use illicit drugs.    Family History   Family History   Problem Relation Age of Onset     Colon Cancer Mother      C.A.D. Mother      liver     Myocardial Infarction Father      Heart Failure Father        Review of Systems   The comprehensive 10 point Review of Systems is negative other than noted in the HPI or here.     Physical Exam   Vital Signs with Ranges  Temp:  [97.7  F (36.5  C)-98.3  F (36.8  C)] 97.7  F (36.5  C)  Pulse:  [71] 71  Heart Rate:  [57-77] 57  Resp:  [15-26] 19  BP: (122-180)/() 122/74  SpO2:  [90 %-98 %] 96 %  Wt Readings from Last 4 Encounters:   07/22/17 104.3 kg (230 lb)    12/29/14 108 kg (238 lb)            Vitals: /74  Temp 97.7  F (36.5  C) (Oral)  Resp 19  SpO2 96%    General:  Patient comfortable, in no apparent distress.  Awake, alert, oriented x3  Eyes: Sclera is anicteric. Conjunctiva is not injected. Pupils are equal and round..  Neck:  No JVD, no carotid bruits.  Back: No kyphosis or scoliosis.  Lungs:  Clear to auscultation bilaterally.  Cardiac:  Regular rate and rhythm, no murmurs, rub, or gallops.  Abdomen:  Soft, nontender.  Extremities:  Without edema.  2+ pulses.  Skin:  Warm, dry.  Neurologic:  No focal deficits.      Recent Labs  Lab 07/22/17  0722   TROPI 0.060*         Recent Labs  Lab 07/22/17 0722   WBC 6.6   HGB 17.7   MCV 97      INR 0.98      POTASSIUM 3.9   CHLORIDE 106   CO2 26   BUN 18   CR 0.97   GFRESTIMATED 80   GFRESTBLACK >90African American GFR Calc   ANIONGAP 6   LEOLA 8.9   *   TROPI 0.060*     No results for input(s): CHOL, HDL, LDL, TRIG, CHOLHDLRATIO in the last 92878 hours.    Recent Labs  Lab 07/22/17 0722   WBC 6.6   HGB 17.7   HCT 50.9   MCV 97        No results for input(s): PH, PHV, PO2, PO2V, SAT, PCO2, PCO2V, HCO3, HCO3V in the last 168 hours.  No results for input(s): NTBNPI, NTBNP in the last 168 hours.  No results for input(s): DD in the last 168 hours.  No results for input(s): SED, CRP in the last 168 hours.    Recent Labs  Lab 07/22/17  0722        No results for input(s): TSH in the last 168 hours.  No results for input(s): COLOR, APPEARANCE, URINEGLC, URINEBILI, URINEKETONE, SG, UBLD, URINEPH, PROTEIN, UROBILINOGEN, NITRITE, LEUKEST, RBCU, WBCU in the last 168 hours.    Imaging:  Recent Results (from the past 48 hour(s))   XR Chest Port 1 View    Narrative    XR CHEST PORT 1 VW 7/22/2017 7:31 AM    COMPARISON: None.    HISTORY: Chest pain      Impression    IMPRESSION: Cardiac silhouette and pulmonary vasculature are within  normal limits. No focal airspace disease, pleural effusion  or  pneumothorax. There appears to be an old healed left eighth rib  fracture.    JORGE REYES       Echo:  No results found for this or any previous visit (from the past 4320 hour(s)).

## 2017-07-22 NOTE — ED PROVIDER NOTES
"  History     Chief Complaint:  Chest Pain & Shortness of Breath    The history is provided by the patient.      Bowen Tipton is a 57 year old male with a history of anxiety attacks who presents to the emergency department today for evaluation of chest pain and shortness of breath and is accompanied by his wife. The patient reports substernal chest pain beginning about 30 minutes ago. The pain is a pressure, constant and non-radiating. There are no alleviating or aggravating factors to his pain. He is dyspneic but denies nausea.  He has no history of CAD. He denies any illegal drug use or personal medical history, but has not been following regularly with a doctor.     CARDIAC RISK FACTORS:  Sex:    M  Tobacco:   Some day smoker  Hypertension:   -  Hyperlipidemia:  +  Diabetes:   -  Family History:  +    PE/DVT RISK FACTORS:  Sex:    Male  Hormones:   -  Tobacco:   +  Cancer:   -  Travel:   -  Surgery:   -  Other immobilization: -  Personal history:  -  Family history:  +     Allergies:  Codeine     Medications:    Ambien    Past Medical History:    HLD  Insomnia     Past Surgical History:    History reviewed. No pertinent surgical history.     Family History:    Colon Cancer  CAD  MI  CHF    Social History:  Tobacco use: Current some day cigarette smoker  Alcohol use: Social  PCP: Lisette Santos    Marital Status:      Review of Systems   Respiratory: Positive for shortness of breath.    Cardiovascular: Positive for chest pain.   All other systems reviewed and are negative.    Physical Exam     Patient Vitals for the past 24 hrs:   BP Temp Pulse Heart Rate Resp SpO2 Height Weight   07/22/17 0732 - - - 71 15 98 % - -   07/22/17 0730 - - - 75 26 96 % - -   07/22/17 0729 (!) 149/102 - - - - - - -   07/22/17 0721 (!) 156/143 - - 77 24 90 % - -   07/22/17 0716 (!) 180/112 98.3  F (36.8  C) 71 - 24 98 % 1.88 m (6' 2\") 104.3 kg (230 lb)   07/22/17 0713 (!) 180/112 - - - - - - -      Physical " Exam    General:   Pleasant, age appropriate ill appearing  male.  HEENT:    Oropharynx is moist, without lesions or trismus.  Eyes:    Conjunctiva normal  Neck:    Supple, no meningismus.     CV:     Regular rate and rhythm.      No murmurs, rubs or gallops.       No JVD or unilateral leg swelling.       2+ radial pulses bilateral.       No lower extremity edema.  PULM:    Clear to auscultation bilateral.       Tachypneic     No respiratory distress.      Good air exchange.     No rales or wheezing.  ABD:    Soft, non-tender, non-distended.       No pulsatile masses.       No rebound, guarding or rigidity.  MSK:     No gross deformity to all four extremities.   LYMPH:   No cervical lymphadenopathy.  NEURO:   Alert. Good muscle tone, no atrophy.  Skin:    Diaphoretic and intact.    Psych:    Anxious        Emergency Department Course     ECG:  ECG taken at 0715, ECG read at 0715  Sinus rhythm  ST elevation, consider early repolarization, pericarditis, or injury  Abnormal ECG  Rate 71 bpm. MS interval 138. QRS duration 98. QT/QTc 414/449. P-R-T axes 68 25 -4.    ECG taken at 0718, ECG read at 0718  Sinus rhythm  ST elevation consider anterior injury or acute infarct  ACUTE MI / STEMI  Rate 69 bpm. MS interval 148. QRS duration 94. QT/QTc 404/432. P-R-T axes 64 31 -2.    Imaging:  Radiology findings were communicated with the admitting MD who voiced understanding of the findings.    XR Chest 1 view portable  Cardiac silhouette and pulmonary vasculature are within  normal limits. No focal airspace disease, pleural effusion or  pneumothorax. There appears to be an old healed left eighth rib  Fracture.  Reading per radiology    Laboratory:  Laboratory findings were communicated with the Admitting MD who voiced understanding of the findings.    CBC: WNL. (WBC 6.6, HGB 17.7, )   BMP: Glucose 133 (H) o/w WNL (Creatinine 0.97)  INR: 0.98  PTT: 31  Troponin POCT (Collected 0725): 0.05  Troponin (Collected  0722): 0.060 (H)    Interventions:  See Emergency Department Course below.    Emergency Department Course:  Nursing notes and vitals reviewed.  I performed an exam of the patient as documented above.   0714: Doctor entered the room  0715: EKG completed  0718: EKG completed  0719: I spoke with Dr. Hurley of the cardiology service from [facility] regarding patient's presentation, findings, and plan of care, who accepted the patient for transfer.  0722: nitroglycerin 0.4 mg sublingual  0723: aspirin 324 mg oral  0725: Chest xray done  0726: Patient notes chest pain has improved slightly after nitroglycerin.  0726: Brilinta 180 mg oral  0727: nitroglycerin 0.4 mg sublingual  0730: nitroglycerin 50 mg IV  0731: EMS arrival  0735: heparin 7,500 units IV  0739: Patient left  I discussed the treatment plan with the patient. They expressed understanding of this plan and consented to admission. I discussed the patient with Dr. Hurley of cardiology at [facility] who will accept the patient for transfer.    Impression & Plan      Medical Decision Making:  Bowen Tipton is a 57 year old male who presents to the emergency department with chest pain and shortness of breath. Patient was ill appearing on examination. EKG showed anterior wall MI with ST elevation in V2-4 and to a lesser degree, in I and avL. There was a reciprocal depression in inferior leads. The patient was given aspirin, heparin, and Brilinta. He was initially started with sublingual nitroglycerin and transitioned to IV nitroglycerin in which pain was improved but still present. He has no suggestive of STEMI mimics. He will be transferred to [facility] given the availability of cardiac catheterization. Dr. Hurley has graciously accepted the patient.    Critical Care time was 30 minutes for this patient excluding procedures.     Diagnosis:    ICD-10-CM    1. ST elevation myocardial infarction (STEMI), unspecified artery (H) I21.3 CBC  with platelets differential     INR     Basic metabolic panel     Troponin I     Partial thromboplastin time     Troponin POCT     Troponin POCT     Disposition:   Transfer to [facility] via EMS    Scribe Disclosure:  I, Jacob Shaw, am serving as a scribe at 7:19 AM on 7/22/2017 to document services personally performed by Saul Monteiro MD, based on my observations and the provider's statements to me.    7/22/2017   [facility] EMERGENCY DEPARTMENT       Saul Monteiro MD  07/22/17 105

## 2017-07-22 NOTE — CONSULTS
[facility]    Hospitalist Consultation    Date of Admission:  7/22/2017  Date of Consult (When I saw the patient): 07/22/17    Assessment & Plan   Bowen Tipton is a 57 year old male who was admitted on 7/22/2017. I was asked to see the patient for assistance with medical issues as well as monitoring for alcohol withdrawal.    ST elevation MI  Mr. Tipton presents to the emergency department this morning after not feeling well.  He somewhat non-specific with his symptoms but he had chest pressure.  At the time presentation to the emergency department he was on and have ST elevation on his EKG.  He got the Cath Lab and had stent placed in his LAD.  He is currently post-angiogram is doing well.  - Further management as per cardiology.  - We'll need dual antiplatelet therapy likely with aspirin and fell into for at least a year  - We'll defer to cardiology regarding statin as well as beta blocker, especially given his low heart rate  - Absolute cessation from tobacco abuse was stressed to the patient.  - will have routine lipids checked  - Check a hemoglobin A1c  - Patient reports regular physicals and states that his blood pressures now under excellent control    Alcohol use  Mr. Tipton admits to using at least 1 L of heavy alcohol weekly.  He states some days he won't have any drinks and some days are 3-4 drinks.  - We'll place him on the CIWA protocol and monitor for withdrawal  - We'll start him on thiamine and folate    History of supraventricular tachycardia   Mr. Tipton describes in the past having cardioversion.  Appears this was for an SVT as described by the patient.  There are no acute issues suspected this at this point     Question of hyperlipidemia  We'll have lipids checked and will be likely placed on a statin, I'll defer to cardiology.    DVT Prophylaxis: Defer to primary service  Code Status: No Order    Disposition: Expected discharge as per cardiology    Janes Eugene,  MD  [phone] (P)  [phone] (C)  Text Page before 6 pm (after call answering service)    Reason for Consult   Reason for consult: I was asked by Dr. Hurley to evaluate this patient for medical management and concern for potential alcohol withdrawal in pt with STEMI.    Primary Care Physician   Dr. Lisette Santos    Chief Complaint   Chest pressure     History is obtained from the patient and medical records.    History of Present Illness   Bowen Tipton is a 57 year old male who presents with chest pressure.  The patient reports waking up this morning and not feeling well.  He is somewhat nonspecific about how he is not feeling well but does state that he had chest pressure.  Has some mild nausea associated with this.  He also had some shortness of breath associated with this.  He also was diaphoretic.  He said it felt similar to his previous episode of supraventricular tachycardia.  Previously he was told to go to the bathroom and tried a vasovagal himself out of an SVT so  attempted this.  However he did not feel better after doing this he promises with two transferred to the emergency department.  Her presentation emergency department an EKG was done.  It demonstrated ST elevation myocardial infarction.  His initial troponin was 0.060.  He was promptly brought to the cath lab and had stenting done to his left anterior descending artery.  I'm seeing him after procedure.  The time of my visit he is doing well.  Denies any chest pain or shortness of breath at this time.  Denies abdominal pain or other complaints on review of systems.      Past Medical History    SVT  ? Hyperlipidemia  Insomnia  Alcohol use/ abuse    Past Surgical History   I have reviewed this patient's surgical history and updated it with pertinent information if needed.  No past surgical history on file.    Prior to Admission Medications   Prior to Admission Medications   Prescriptions Last Dose Informant Patient Reported? Taking?    DiphenhydrAMINE HCl, Sleep, (SIMPLY SLEEP PO)   Yes No      Facility-Administered Medications: None     Allergies   Allergies   Allergen Reactions     Codeine      reaction       Social History   I have reviewed this patient's social history and updated it with pertinent information if needed. Bowen Tipton  reports that he has been smoking Cigarettes.  He has never used smokeless tobacco. He reports that he drinks alcohol. He reports that he does not use illicit drugs. he states he consumes ~1L heavy alcohol per week.     Family History   I have reviewed this patient's family history and updated it with pertinent information if needed.   Family History   Problem Relation Age of Onset     Colon Cancer Mother      C.A.D. Mother      liver     Myocardial Infarction Father      Heart Failure Father        Review of Systems   The 10 point Review of Systems is negative other than noted in the HPI or here.     Physical Exam   Temp: 97.7  F (36.5  C) Temp src: Oral BP: 122/74   Heart Rate: 57 Resp: 19 SpO2: 96 % O2 Device: None (Room air)    Vital Signs with Ranges  Temp:  [97.7  F (36.5  C)-98.3  F (36.8  C)] 97.7  F (36.5  C)  Pulse:  [71] 71  Heart Rate:  [57-77] 57  Resp:  [15-26] 19  BP: (122-180)/() 122/74  SpO2:  [90 %-98 %] 96 %  0 lbs 0 oz    Cons he  is alert and oriented no acute distress  Eyes  EOMI  HEENT: OP clear, neck supple   Respiratory: lungs CTA B  Cardiovascular: RRR no m/r/g  GI: soft, nt/nd  Lymph/Hematologic: no lymphadenopathy appreciated  Genitourinary: deferred  Skin: no rashes or lesions  Musculoskeletal: no arthritis or deformities  Neurologic: CN II-XII. Moves extremities with strength intact  Psychiatric: mood and affect wnl    Data   -Data reviewed today: All pertinent laboratory and imaging results from this encounter were reviewed. I personally reviewed the EKG tracing showing ST elevation MI.    Recent Labs  Lab 07/22/17  0725 07/22/17  0722   WBC  --  6.6   HGB  --  17.7   MCV   --  97   PLT  --  213   INR  --  0.98   NA  --  138   POTASSIUM  --  3.9   CHLORIDE  --  106   CO2  --  26   BUN  --  18   CR  --  0.97   ANIONGAP  --  6   LEOLA  --  8.9   GLC  --  133*   TROPI  --  0.060*   TROPONIN 0.05  --        Recent Results (from the past 24 hour(s))   XR Chest Port 1 View    Narrative    XR CHEST PORT 1 VW 7/22/2017 7:31 AM    COMPARISON: None.    HISTORY: Chest pain      Impression    IMPRESSION: Cardiac silhouette and pulmonary vasculature are within  normal limits. No focal airspace disease, pleural effusion or  pneumothorax. There appears to be an old healed left eighth rib  fracture.    JORGE REYES

## 2017-07-22 NOTE — IP AVS SNAPSHOT
MRN:[id number]                      After Visit Summary   7/22/2017    Bowen Tipton    MRN: [id number]           Thank you!     Thank you for choosing [facility] for your care. Our goal is always to provide you with excellent care. Hearing back from our patients is one way we can continue to improve our services. Please take a few minutes to complete the written survey that you may receive in the mail after you visit with us. Thank you!        Patient Information     Date Of Birth          1960        Designated Caregiver       Most Recent Value    Caregiver    Will someone help with your care after discharge? yes    Name of designated caregiver Felisa    Phone number of caregiver [phone]    Caregiver [place] [address] [place] [address]      About your hospital stay     You were admitted on:  July 22, 2017 You last received care in the:  [facility]    You were discharged on:  July 23, 2017       Who to Call     For medical emergencies, please call 911.  For non-urgent questions about your medical care, please call your primary care provider or clinic, None          Attending Provider     Provider Specialty    Brandon Hurley MD Cardiology    Suarez, Stef Lamb MD Cardiology       Primary Care Provider    None Specified      Your next 10 appointments already scheduled     Jul 24, 2017  8:30 AM CDT   Heart Cath Left Heart Cath with [id number]   [facility] ([facility])    [address]  [place] [id number] [phone]              Additional Services     CARDIAC REHAB REFERRAL       Patient may choose their preference of the site for Cardiac Rehab.            CARDIAC REHAB REFERRAL       Please be aware that coverage of these services is subject to the terms and limitations of your health insurance plan. Call member services at your health plan with any benefit or coverage questions.    Order is sent  electronically to central rehab scheduling. Call [phone] if you haven't been contacted regarding these appointments within 2 business days of discharge.            Follow-Up with Cardiac Advanced Practice Provider           Follow-Up with Cardiologist                 Further instructions from your care team       Cardiac Angioplasty/Stent Discharge Instructions - Radial    After you go home:      Have an adult stay with you until tomorrow.    Drink extra fluids for 2 days.    You may resume your normal diet.    No smoking       For 24 hours - due to the sedation you received:    Relax and take it easy.    Do NOT make any important or legal decisions.    Do NOT drive or operate machines at home or at work.    Do NOT drink alcohol.    Care of Wrist Puncture Site:      For the first 24 hrs - check the puncture site every 1-2 hours while awake.    It is normal to have soreness at the puncture site and mild tingling in your hand for up to 3 days.    Remove the bandaid after 24 hours. If there is minor oozing, apply another bandaid and remove it after 12 hours.    You may shower tomorrow.  Do NOT take a bath, or use a hot tub or pool for at least 3 days. Do NOT scrub the site. Do not use lotion or powder near the puncture site.           Activity:          For 2 days:     do not use your hand or arm to support your weight (such as rising from a chair)     do not bend your wrist (such as lifting a garage door).    do not lift more than 5 pounds or exercise your arm (such as tennis, golf or bowling).    Do NOT do any heavy activity such as exercise, lifting, or straining.     Bleeding:      If you start bleeding from the site in your wrist, sit down and press firmly on/above the site for 10 minutes.     Once bleeding stops, keep arm still for 2 hours.     Call [facility] as soon as you can.       Call 911 right away if you have heavy bleeding or bleeding that does not stop.      Medicines:      If you are taking  antiplatelet medications such as Plavix, Brilinta or Effient, do not stop taking it until you talk to your cardiologist.        If you are on Metformin (Glucophage) and your GFR (kidney function level) is >30, you may continue taking your Metformin.    If you are on Metformin (Glucophage) and your GFR (kidney function level) is <30, do not restart the Metformin for 48 hours after your procedure. Check with your primary care giver before restarting the Metformin to see if you need to have blood drawn to recheck your kidney function (GFR).    Take your medications, including blood thinners, unless your provider tells you not to.  If you take Coumadin (Warfarin), have your INR checked by your provider in  3-5 days. Call your clinic to schedule this.    If you have stopped any medicines, check with your provider about when to restart them.    Follow Up Appointments:      Follow up with [facility] Nurse Practitioner at [facility] Clinic of patient preference in 7-10 days.    Cardiac Rehab will contact you for follow up care.    Call the clinic if:      You have a large or growing hard lump around the site.    The site is red, swollen, hot or tender.    Blood or fluid is draining from the site.    You have chills or a fever greater than 101 F (38 C).    Your arm turns feels numb, cool or changes color.    You have hives, a rash or unusual itching.    Any questions or concerns.    Other Instructions:      If you received a stent - carry your stent card with you at all times.      [facility]:    [phone] [facility] (7 days a week)            Pending Results     Date and Time Order Name Status Description    7/22/2017 1027 EKG 12-lead, tracing only  Preliminary     7/22/2017 0726 EKG 12 lead Preliminary     7/22/2017 0718 EKG 12-lead, tracing only Preliminary             Statement of Approval     Ordered          07/23/17 7083  I have reviewed and agree with all the recommendations and  "orders detailed in this document.  EFFECTIVE NOW     Approved and electronically signed by:  Artie Leach MD             Admission Information     Date & Time Provider Department Dept. Phone    2017 Stef Suarez MD [facility] [phone]      Your Vitals Were     Blood Pressure Temperature Respirations Height Weight Pulse Oximetry    112/71 97.8  F (36.6  C) (Oral) 16 1.88 m (6' 2\") 110 kg (242 lb 8.1 oz) 93%    BMI (Body Mass Index)                   31.14 kg/m2           MyChart Information     [employer] lets you send messages to your doctor, view your test results, renew your prescriptions, schedule appointments and more. To sign up, go to [email or web address] . Click on \"Log in\" on the left side of the screen, which will take you to the Welcome page. Then click on \"Sign up Now\" on the right side of the page.     You will be asked to enter the access code listed below, as well as some personal information. Please follow the directions to create your username and password.     Your access code is: [email or web address]  Expires: 10/21/2017  4:19 PM     Your access code will  in 90 days. If you need help or a new code, please call your [place] clinic or [phone].        Care EveryWhere ID     This is your Care EveryWhere ID. This could be used by other organizations to access your [place] medical records  [id number]        Equal Access to Services     [facility] AH: Hadii shaji ku hadasho Soomaali, waaxda luqadaha, qaybta kaalmada adeegyada, lalo gresham . So [facility] [phone].    ATENCIÓN: Si habla español, tiene a clay disposición servicios gratuitos de asistencia lingüística. Llame al [phone].    We comply with applicable federal civil rights laws and [place] laws. We do not discriminate on the basis of race, color, national origin, age, disability sex, sexual orientation or gender identity.               Review of your medicines    "   START taking        Dose / Directions    aspirin 81 MG EC tablet   Used for:  ST elevation myocardial infarction involving left anterior descending (LAD) coronary artery (H)        Dose:  81 mg   Take 1 tablet (81 mg) by mouth daily   Quantity:  31 tablet   Refills:  11       atorvastatin 80 MG tablet   Commonly known as:  LIPITOR   Used for:  ST elevation myocardial infarction involving left anterior descending (LAD) coronary artery (H)        Dose:  80 mg   Take 1 tablet (80 mg) by mouth At Bedtime   Quantity:  30 tablet   Refills:  11       lisinopril 5 MG tablet   Commonly known as:  PRINIVIL/ZESTRIL   Used for:  ST elevation myocardial infarction involving left anterior descending (LAD) coronary artery (H)        Dose:  5 mg   Take 1 tablet (5 mg) by mouth daily   Quantity:  30 tablet   Refills:  11       metoprolol 25 MG tablet   Commonly known as:  LOPRESSOR   Used for:  ST elevation myocardial infarction involving left anterior descending (LAD) coronary artery (H)        Dose:  25 mg   Take 1 tablet (25 mg) by mouth 2 times daily   Quantity:  60 tablet   Refills:  11       nitroGLYcerin 0.4 MG sublingual tablet   Commonly known as:  NITROSTAT   Used for:  ST elevation myocardial infarction involving left anterior descending (LAD) coronary artery (H)        For chest pain place 1 tablet under the tongue every 5 minutes for 3 doses. If symptoms persist 5 minutes after 1st dose call 911.   Quantity:  25 tablet   Refills:  3       ticagrelor 90 MG tablet   Commonly known as:  BRILINTA   Used for:  ST elevation myocardial infarction involving left anterior descending (LAD) coronary artery (H)        Dose:  90 mg   Take 1 tablet (90 mg) by mouth every 12 hours   Quantity:  60 tablet   Refills:  11         CONTINUE these medicines which have NOT CHANGED        Dose / Directions    PRILOSEC OTC PO        Dose:  20 mg   Take 20 mg by mouth daily as needed   Refills:  0         STOP taking     SIMPLY SLEEP PO                 Where to get your medicines      These medications were sent to [facility] [address] [facility] [address] AT [place] [address]  [address] [facility] [address]    Hours:  24-hours Phone:  [phone]     aspirin 81 MG EC tablet    atorvastatin 80 MG tablet    lisinopril 5 MG tablet    metoprolol 25 MG tablet    nitroGLYcerin 0.4 MG sublingual tablet    ticagrelor 90 MG tablet                Protect others around you: Learn how to safely use, store and throw away your medicines at www.disposemymeds.org.             Medication List: This is a list of all your medications and when to take them. Check marks below indicate your daily home schedule. Keep this list as a reference.      Medications           Morning Afternoon Evening Bedtime As Needed    aspirin 81 MG EC tablet   Take 1 tablet (81 mg) by mouth daily   Last time this was given:  81 mg on 7/23/2017  8:14 AM                                atorvastatin 80 MG tablet   Commonly known as:  LIPITOR   Take 1 tablet (80 mg) by mouth At Bedtime   Last time this was given:  80 mg on 7/22/2017  8:19 PM                                lisinopril 5 MG tablet   Commonly known as:  PRINIVIL/ZESTRIL   Take 1 tablet (5 mg) by mouth daily   Last time this was given:  5 mg on 7/23/2017  8:14 AM                                metoprolol 25 MG tablet   Commonly known as:  LOPRESSOR   Take 1 tablet (25 mg) by mouth 2 times daily   Last time this was given:  25 mg on 7/23/2017  8:14 AM                                nitroGLYcerin 0.4 MG sublingual tablet   Commonly known as:  NITROSTAT   For chest pain place 1 tablet under the tongue every 5 minutes for 3 doses. If symptoms persist 5 minutes after 1st dose call 911.                                PRILOSEC OTC PO   Take 20 mg by mouth daily as needed                                   ticagrelor 90 MG tablet   Commonly known as:  BRILINTA   Take 1 tablet (90 mg) by mouth every 12  hours   Last time this was given:  90 mg on 7/23/2017  8:14 AM

## 2017-07-23 ENCOUNTER — APPOINTMENT (OUTPATIENT)
Dept: OCCUPATIONAL THERAPY | Facility: CLINIC | Age: 57
DRG: 247 | End: 2017-07-23
Attending: INTERNAL MEDICINE
Payer: COMMERCIAL

## 2017-07-23 VITALS
OXYGEN SATURATION: 93 % | DIASTOLIC BLOOD PRESSURE: 71 MMHG | HEIGHT: 74 IN | RESPIRATION RATE: 16 BRPM | TEMPERATURE: 97.8 F | SYSTOLIC BLOOD PRESSURE: 112 MMHG | BODY MASS INDEX: 31.12 KG/M2 | WEIGHT: 242.51 LBS

## 2017-07-23 LAB
ANION GAP SERPL CALCULATED.3IONS-SCNC: 10 MMOL/L (ref 3–14)
BUN SERPL-MCNC: 16 MG/DL (ref 7–30)
CALCIUM SERPL-MCNC: 8.6 MG/DL (ref 8.5–10.1)
CHLORIDE SERPL-SCNC: 107 MMOL/L (ref 94–109)
CO2 SERPL-SCNC: 23 MMOL/L (ref 20–32)
CREAT SERPL-MCNC: 0.86 MG/DL (ref 0.66–1.25)
ERYTHROCYTE [DISTWIDTH] IN BLOOD BY AUTOMATED COUNT: 12.5 % (ref 10–15)
GFR SERPL CREATININE-BSD FRML MDRD: ABNORMAL ML/MIN/1.7M2
GLUCOSE SERPL-MCNC: 114 MG/DL (ref 70–99)
HCT VFR BLD AUTO: 46.7 % (ref 40–53)
HGB BLD-MCNC: 16.6 G/DL (ref 13.3–17.7)
MCH RBC QN AUTO: 34.4 PG (ref 26.5–33)
MCHC RBC AUTO-ENTMCNC: 35.5 G/DL (ref 31.5–36.5)
MCV RBC AUTO: 97 FL (ref 78–100)
PLATELET # BLD AUTO: 159 10E9/L (ref 150–450)
POTASSIUM SERPL-SCNC: 4.1 MMOL/L (ref 3.4–5.3)
RBC # BLD AUTO: 4.82 10E12/L (ref 4.4–5.9)
SODIUM SERPL-SCNC: 140 MMOL/L (ref 133–144)
TROPONIN I SERPL-MCNC: 2.48 UG/L (ref 0–0.04)
WBC # BLD AUTO: 8.5 10E9/L (ref 4–11)

## 2017-07-23 PROCEDURE — 25000132 ZZH RX MED GY IP 250 OP 250 PS 637: Performed by: INTERNAL MEDICINE

## 2017-07-23 PROCEDURE — 99238 HOSP IP/OBS DSCHRG MGMT 30/<: CPT | Performed by: INTERNAL MEDICINE

## 2017-07-23 PROCEDURE — 40000133 ZZH STATISTIC OT WARD VISIT: Performed by: OCCUPATIONAL THERAPIST

## 2017-07-23 PROCEDURE — 97165 OT EVAL LOW COMPLEX 30 MIN: CPT | Mod: GO | Performed by: OCCUPATIONAL THERAPIST

## 2017-07-23 PROCEDURE — 99207 ZZC CDG-MDM COMPONENT: MEETS MODERATE - UP CODED: CPT | Performed by: INTERNAL MEDICINE

## 2017-07-23 PROCEDURE — 25000132 ZZH RX MED GY IP 250 OP 250 PS 637

## 2017-07-23 PROCEDURE — 84484 ASSAY OF TROPONIN QUANT: CPT | Performed by: INTERNAL MEDICINE

## 2017-07-23 PROCEDURE — 99232 SBSQ HOSP IP/OBS MODERATE 35: CPT | Performed by: INTERNAL MEDICINE

## 2017-07-23 PROCEDURE — 80048 BASIC METABOLIC PNL TOTAL CA: CPT

## 2017-07-23 PROCEDURE — 85027 COMPLETE CBC AUTOMATED: CPT

## 2017-07-23 PROCEDURE — 97110 THERAPEUTIC EXERCISES: CPT | Mod: GO | Performed by: OCCUPATIONAL THERAPIST

## 2017-07-23 PROCEDURE — 36415 COLL VENOUS BLD VENIPUNCTURE: CPT

## 2017-07-23 PROCEDURE — 97535 SELF CARE MNGMENT TRAINING: CPT | Mod: GO | Performed by: OCCUPATIONAL THERAPIST

## 2017-07-23 RX ORDER — ATORVASTATIN CALCIUM 80 MG/1
80 TABLET, FILM COATED ORAL AT BEDTIME
Qty: 30 TABLET | Refills: 11 | Status: SHIPPED | OUTPATIENT
Start: 2017-07-23 | End: 2018-07-31

## 2017-07-23 RX ORDER — METOPROLOL TARTRATE 25 MG/1
25 TABLET, FILM COATED ORAL 2 TIMES DAILY
Qty: 60 TABLET | Refills: 11 | Status: SHIPPED | OUTPATIENT
Start: 2017-07-23 | End: 2018-07-31

## 2017-07-23 RX ORDER — LISINOPRIL 5 MG/1
5 TABLET ORAL DAILY
Qty: 30 TABLET | Refills: 11 | Status: SHIPPED | OUTPATIENT
Start: 2017-07-23 | End: 2018-07-31

## 2017-07-23 RX ORDER — ATORVASTATIN CALCIUM 80 MG/1
80 TABLET, FILM COATED ORAL AT BEDTIME
Qty: 30 TABLET | Refills: 11 | Status: SHIPPED | OUTPATIENT
Start: 2017-07-23 | End: 2017-07-23

## 2017-07-23 RX ORDER — METOPROLOL TARTRATE 25 MG/1
25 TABLET, FILM COATED ORAL 2 TIMES DAILY
Qty: 60 TABLET | Refills: 11 | Status: SHIPPED | OUTPATIENT
Start: 2017-07-23 | End: 2017-07-23

## 2017-07-23 RX ORDER — LISINOPRIL 5 MG/1
5 TABLET ORAL DAILY
Qty: 30 TABLET | Refills: 11 | Status: SHIPPED | OUTPATIENT
Start: 2017-07-23 | End: 2017-07-23

## 2017-07-23 RX ORDER — NITROGLYCERIN 0.4 MG/1
TABLET SUBLINGUAL
Qty: 25 TABLET | Refills: 3 | Status: SHIPPED | OUTPATIENT
Start: 2017-07-23

## 2017-07-23 RX ORDER — NITROGLYCERIN 0.4 MG/1
TABLET SUBLINGUAL
Qty: 25 TABLET | Refills: 3 | Status: SHIPPED | OUTPATIENT
Start: 2017-07-23 | End: 2017-07-23

## 2017-07-23 RX ORDER — TRAMADOL HYDROCHLORIDE 50 MG/1
50 TABLET ORAL EVERY 6 HOURS PRN
Status: DISCONTINUED | OUTPATIENT
Start: 2017-07-23 | End: 2017-07-23 | Stop reason: HOSPADM

## 2017-07-23 RX ADMIN — MULTIPLE VITAMINS W/ MINERALS TAB 1 TABLET: TAB at 08:14

## 2017-07-23 RX ADMIN — Medication 100 MG: at 08:14

## 2017-07-23 RX ADMIN — ASPIRIN 81 MG: 81 TABLET, COATED ORAL at 08:14

## 2017-07-23 RX ADMIN — TRAMADOL HYDROCHLORIDE 50 MG: 50 TABLET, COATED ORAL at 11:12

## 2017-07-23 RX ADMIN — LISINOPRIL 5 MG: 5 TABLET ORAL at 08:14

## 2017-07-23 RX ADMIN — FOLIC ACID 1 MG: 1 TABLET ORAL at 08:14

## 2017-07-23 RX ADMIN — TICAGRELOR 90 MG: 90 TABLET ORAL at 08:14

## 2017-07-23 RX ADMIN — METOPROLOL TARTRATE 25 MG: 25 TABLET ORAL at 08:14

## 2017-07-23 ASSESSMENT — PAIN DESCRIPTION - DESCRIPTORS: DESCRIPTORS: ACHING

## 2017-07-23 NOTE — CONSULTS
NUTRITION EDUCATION      REASON FOR NUTRITION CONSULT:  Provider Order  -  Nutrition Education - Heart healthy diet    ASSESSMENT:  Unable to complete nutrition assessment and instructions at this time.    FOLLOW UP:   Will instruct patient prior to discharge    Rhianna Young RD  Pager [phone] (M-F)            [phone] ([facility])

## 2017-07-23 NOTE — DISCHARGE SUMMARY
[facility]    Discharge Summary  Cardiology    Date of Admission:  7/22/2017  Date of Discharge:  7/23/2017  Discharging Provider: Artie Leach MD  Date of Service (when I saw the patient): 07/23/17    Discharge Diagnoses   Active Problems:    STEMI (ST elevation myocardial infarction) (H)      History of Present Illness   Bowen Tipton is an 57 year old male who presented with anterior STEMI.     Hospital Course   Bowen Tipton was admitted on 7/22/2017.  The following problems were addressed during his hospitalization: He presented with an anterior STEMI. He underwent successful PCI of LAD territory. Patient has been chest pain free and attended cardiac rehabilitation as well. After discussion with Dr. Hurely, we discharged the patient.    Active Problems:    STEMI (ST elevation myocardial infarction) (H)      Artie Leach MD    Significant Results and Procedures   PCI of LAD.      Code Status   Full Code    Primary Care Physician   No primary care provider on file.    Physical Exam   Temp: 97.8  F (36.6  C) Temp src: Oral BP: 123/90   Heart Rate: 60 Resp: 18 SpO2: 100 % O2 Device: None (Room air)    Vitals:    07/23/17 0500   Weight: 110 kg (242 lb 8.1 oz)     Vital Signs with Ranges  Temp:  [97.8  F (36.6  C)-98.8  F (37.1  C)] 97.8  F (36.6  C)  Heart Rate:  [54-67] 60  Resp:  [12-20] 18  BP: (118-136)/(70-93) 123/90  SpO2:  [96 %-100 %] 100 %  I/O last 3 completed shifts:  In: 356 [P.O.:350; I.V.:6]  Out: -     Constitutional: alert  Respiratory: No increased work of breathing, good air exchange, clear to auscultation bilaterally, no crackles or wheezing  Cardiovascular: Normal apical impulse, regular rate and rhythm, normal S1 and S2, no S3 or S4, and no murmur noted    Time Spent on this Encounter   Artie BAZAN, personally saw the patient today and spent less than or equal to 30 minutes discharging this patient.    Discharge Disposition   Discharged to home  Condition at  discharge: Stable    Consultations This Hospital Stay   CARDIAC REHAB IP CONSULT  NUTRITION SERVICES ADULT IP CONSULT  PHARMACY IP CONSULT  PHARMACY IP CONSULT  HOSPITALIST IP CONSULT  SMOKING CESSATION PROGRAM IP CONSULT    Discharge Orders     CARDIAC REHAB REFERRAL       Discharge Medications   Current Discharge Medication List      START taking these medications    Details   aspirin EC 81 MG EC tablet Take 1 tablet (81 mg) by mouth daily  Qty: 31 tablet, Refills: 11    Associated Diagnoses: ST elevation myocardial infarction involving left anterior descending (LAD) coronary artery (H)      nitroGLYcerin (NITROSTAT) 0.4 MG sublingual tablet For chest pain place 1 tablet under the tongue every 5 minutes for 3 doses. If symptoms persist 5 minutes after 1st dose call 911.  Qty: 25 tablet, Refills: 3    Associated Diagnoses: ST elevation myocardial infarction involving left anterior descending (LAD) coronary artery (H)      atorvastatin (LIPITOR) 80 MG tablet Take 1 tablet (80 mg) by mouth At Bedtime  Qty: 30 tablet, Refills: 11    Associated Diagnoses: ST elevation myocardial infarction involving left anterior descending (LAD) coronary artery (H)      lisinopril (PRINIVIL/ZESTRIL) 5 MG tablet Take 1 tablet (5 mg) by mouth daily  Qty: 30 tablet, Refills: 11    Associated Diagnoses: ST elevation myocardial infarction involving left anterior descending (LAD) coronary artery (H)      metoprolol (LOPRESSOR) 25 MG tablet Take 1 tablet (25 mg) by mouth 2 times daily  Qty: 60 tablet, Refills: 11    Associated Diagnoses: ST elevation myocardial infarction involving left anterior descending (LAD) coronary artery (H)      ticagrelor (BRILINTA) 90 MG tablet Take 1 tablet (90 mg) by mouth every 12 hours  Qty: 60 tablet, Refills: 11    Associated Diagnoses: ST elevation myocardial infarction involving left anterior descending (LAD) coronary artery (H)         CONTINUE these medications which have NOT CHANGED    Details    Omeprazole Magnesium (PRILOSEC OTC PO) Take 20 mg by mouth daily as needed         STOP taking these medications       DiphenhydrAMINE HCl, Sleep, (SIMPLY SLEEP PO) Comments:   Reason for Stopping:             Allergies   Allergies   Allergen Reactions     Codeine      reaction     Data   Results for orders placed or performed during the hospital encounter of 07/22/17   XR Chest Port 1 View    Narrative    XR CHEST PORT 1 VW 7/22/2017 7:31 AM    COMPARISON: None.    HISTORY: Chest pain      Impression    IMPRESSION: Cardiac silhouette and pulmonary vasculature are within  normal limits. No focal airspace disease, pleural effusion or  pneumothorax. There appears to be an old healed left eighth rib  fracture.    JORGE REYES

## 2017-07-23 NOTE — PROVIDER NOTIFICATION
MD Notification    Notified Person:  MD    Notified Persons Name: Dr. Eugene     Notification Date/Time:  07/23/17   11:15am    Notification Interaction:  Talked with Physician    Purpose of Notification:  Pt c/o back pain and requesting Aleve as he states he is not suppose to take Tylenol.    Orders Received: New orders for Tramadol 50mg Q 6hrs PRN pain.    Comments:  Will continue to monitor pt.

## 2017-07-23 NOTE — PROGRESS NOTES
[facility]    Hospitalist Progress Note    Date of Service (when I saw the patient): 07/23/2017    Assessment & Plan    Mr. Tipton is a 56 y/o male admitted on 7/22 with an ST elevation MI. The hospitalist service was consulted to monitor for potential alcohol withdrawal.     ST elevation MI  Mr. Tipton presents to the emergency department this morning after not feeling well.  He somewhat non-specific with his symptoms but he had chest pressure.  At the time presentation to the emergency department he was on and have ST elevation on his EKG.  He got the Cath Lab and had stent placed in his LAD.  He is currently post-angiogram is doing well.  - Further management as per cardiology.  - meds, including ASA 81 mg, brilinta 90 BID, lisinopril 5 mg, metoprolol 25 mg BID and atorvastatin as per cardiology  - Absolute cessation from tobacco abuse was stressed to the patient  - dispo as per cardiology     Alcohol use  Mr. Tipton admits to using at least 1 L of heavy alcohol weekly.  He states some days he won't have any drinks and some days are 3-4 drinks.  - on CIWA protocol, no evidence of withdrawal  - We'll start him on thiamine and folate     History of supraventricular tachycardia   Mr. Tipton describes in the past having cardioversion.  Appears this was for an SVT as described by the patient.  There are no acute issues suspected this at this point      Question of hyperlipidemia  Started on statin as per cardiology    FEN (fluids, electrolytes and nutrition):  Cardiac diet  Discussed with nursing.  DVT Prophylaxis: Ambulate every shift  Code Status: No Order    Disposition: Expected discharge later today per cardiology    Janes Eugene MD  [phone] (P)  [phone] (C)  Text Page until 6 pm (after call answering service)    Interval History   Doing well. Tolerating therapies. Denies cp/sob    -Data reviewed today: I reviewed all new labs and imaging results over the last 24 hours. I  personally reviewed no images or EKG's today.    Physical Exam   Temp: 98.8  F (37.1  C) Temp src: Oral BP: 123/90   Heart Rate: 60 Resp: 18 SpO2: 100 % O2 Device: None (Room air)    Vitals:    07/23/17 0500   Weight: 110 kg (242 lb 8.1 oz)     Vital Signs with Ranges  Temp:  [97.7  F (36.5  C)-98.8  F (37.1  C)] 98.8  F (37.1  C)  Heart Rate:  [54-67] 60  Resp:  [12-20] 18  BP: (118-136)/(70-93) 123/90  SpO2:  [96 %-100 %] 100 %  I/O last 3 completed shifts:  In: 3 [I.V.:3]  Out: -     Constitutional: Alert, oriented, no acute distress  Respiratory: Lungs clear to auscultation bilaterally, no wheezes, no crackles  Cardiovascular: Regular rate and rhythm, no murmurs  GI: Soft, non-tender, non-disteneded, good bowel sounds  Skin/Integumen: No erythema, cyanosis or edema  Other:      Medications     Percutaneous Coronary Intervention orders placed (this is information for BPA alerting)         sodium chloride (PF)  3 mL Intracatheter Q8H     aspirin EC  81 mg Oral Daily     ticagrelor  90 mg Oral Q12H     metoprolol  25 mg Oral BID     lisinopril  5 mg Oral Daily     atorvastatin  80 mg Oral At Bedtime     thiamine  100 mg Oral Daily     folic acid  1 mg Oral Daily     multivitamin, therapeutic with minerals  1 tablet Oral Daily       Data     Recent Labs  Lab 07/23/17  0306 07/22/17  1846 07/22/17  1100 07/22/17  0725 07/22/17  0722   WBC 8.5  --   --   --  6.6   HGB 16.6  --   --   --  17.7   MCV 97  --   --   --  97     --   --   --  213   INR  --   --   --   --  0.98     --   --   --  138   POTASSIUM 4.1  --   --   --  3.9   CHLORIDE 107  --   --   --  106   CO2 23  --   --   --  26   BUN 16  --   --   --  18   CR 0.86  --   --   --  0.97   ANIONGAP 10  --   --   --  6   LEOLA 8.6  --   --   --  8.9   *  --   --   --  133*   TROPI 2.476* 1.075* 0.330*  --  0.060*   TROPONIN  --   --   --  0.05  --        No results found for this or any previous visit (from the past 24 hour(s)).

## 2017-07-23 NOTE — PROGRESS NOTES
07/23/17 1000   Quick Adds   Type of Visit Initial Occupational Therapy Evaluation   Living Environment   Lives With child(joanna), adult;spouse  (adult daughter living with PT for the summer)   Living Arrangements house   Home Accessibility no concerns   Number of Stairs to Enter Home (10 steps to front of house)   Number of Stairs Within Home (12 steps to basement)   Transportation Available car;family or friend will provide   Self-Care   Usual Activity Tolerance good   Current Activity Tolerance moderate   Regular Exercise no   Activity/Exercise/Self-Care Comment .  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   Functional Level Prior   Ambulation 0-->independent   Transferring 0-->independent   Toileting 0-->independent   Bathing 0-->independent   Dressing 0-->independent   Eating 0-->independent   Communication 0-->understands/communicates without difficulty   Swallowing 0-->swallows foods/liquids without difficulty   Cognition 0 - no cognition issues reported   General Information   Onset of Illness/Injury or Date of Surgery - Date 10/22/17   Referring Physician Brandon Monzon   Patient/Family Goals Statement To return home when medically able.   Additional Occupational Profile Info/Pertinent History of Current Problem STEMI,  PTCA/stent LAD, EF 60-65%.  Admitted with chest pain to left arm and associated diaphoresis, dyspnea and nausea.  Hx episode of SVT, vague about hx of alcohol use.       Precautions/Limitations other (see comments)  (s/p angio with radial approach)   Heart Disease Risk Factors Smoking;Dislipidemia;Stress;Family history;Gender   General Observations PT eager for d/c but cooperative with all aspects of rehab and engaged in teaching process.     General Info Comments CV responses WNL's.   Cognitive Status Examination   Orientation orientation to person, place and time   Level of Consciousness alert   Able to Follow Commands WNL/WFL   Personal Safety (Cognitive) WNL/WFL   Memory  intact   Attention No deficits were identified   Organization/Problem Solving No deficits were identified   Executive Function No deficits were identified   Visual Perception   Visual Perception No deficits were identified   Sensory Examination   Sensory Quick Adds No deficits were identified   Pain Assessment   Patient Currently in Pain Yes, see Vital Sign flowsheet  (lower back pain)   Posture   Posture not impaired   Range of Motion (ROM)   ROM Quick Adds No deficits were identified   Muscle Tone Assessment   Muscle Tone Quick Adds No deficits were identified   Coordination   Upper Extremity Coordination No deficits were identified   Transfer Skills   Transfer Comments All transfers performed with SBA to independently   Balance   Balance Quick Add No deficits identified   Lower Body Dressing   Level of Industry: Dress Lower Body independent   Instrumental Activities of Daily Living (IADL)   Previous Responsibilities housekeeping;yardwork;finances;driving;work   Activities of Daily Living Analysis   Impairments Contributing to Impaired Activities of Daily Living strength decreased   General Therapy Interventions   Planned Therapy Interventions home program guidelines;progressive activity/exercise;risk factor education   Clinical Impression   Criteria for Skilled Therapeutic Interventions Met yes, treatment indicated   OT Diagnosis Decreased independence in IADL's     Influenced by the following impairments Lack of knowledge of post MI healing, activity guidelines and secondary CAD prevention   Assessment of Occupational Performance 1-3 Performance Deficits   Identified Performance Deficits Decreased ability to safely resume IADL's and incoporate secondary CAD prevention lifestyle changes.     Clinical Decision Making (Complexity) Low complexity   Therapy Frequency 2 times/day   Predicted Duration of Therapy Intervention (days/wks) 1 day   Anticipated Discharge Disposition Home with Outpatient Therapy   Risks  "and Benefits of Treatment have been explained. Yes   Patient, Family & other staff in agreement with plan of care Yes   [facility] AM-PAC  \"6 Clicks\" Daily Activity Inpatient Short Form   1. Putting on and taking off regular lower body clothing? 4 - None   2. Bathing (including washing, rinsing, drying)? 4 - None   3. Toileting, which includes using toilet, bedpan or urinal? 4 - None   4. Putting on and taking off regular upper body clothing? 4 - None   5. Taking care of personal grooming such as brushing teeth? 4 - None   6. Eating meals? 4 - None   Daily Activity Raw Score (Score out of 24.Lower scores equate to lower levels of function) 24   Total Evaluation Time   Total Evaluation Time (Minutes) 12     "

## 2017-07-23 NOTE — PLAN OF CARE
Problem: Goal Outcome Summary  Goal: Goal Outcome Summary  Occupational Therapy Discharge Summary     Reason for therapy discharge:    Discharged to home with outpatient therapy.     Progress towards therapy goal(s). See goals on Care Plan in [facility] electronic health record for goal details.  Goals met     Therapy recommendation(s):    Continued therapy is recommended.  Rationale/Recommendations:  Out PT cardiac rehab for monitored exercise progression and education/behavior change counseling related to secondary CAD prevention.

## 2017-07-23 NOTE — PROVIDER NOTIFICATION
"MD Notification    Notified Person:  MD    Notified Persons Name: Marshall    Notification Date/Time: 7/22 @ 1933    Notification Interaction:  Talked with Physician    Purpose of Notification: Pt requesting sleep aid. States normally takes \"Simply Sleep\" takes 2 (but sometimes takes 4-5 if \"brain won't shut off\"). Simply sleep = diphenhydramine HCL. He also reports previously taking Ambien 5mg, to which PCP instructed him to start off with one tablet, and if needed he could take another. Denies complications when taking Ambien.     Orders Received: 5mg Ambien prn @ bedtime    Comments:    "

## 2017-07-23 NOTE — PLAN OF CARE
Problem: Goal Outcome Summary  Goal: Goal Outcome Summary  Outcome: No Change  A&O, denies CP/SOB. HR48-65 otherwise VSS on RA. Lt radial site c/d/i, CMS intact. Prn Ambien given at HS. Slept between cares. Scoring zero on CIWA scale. Tele:SB/SR. Plan for: cardiac rehab and d/c today or tomorrow.

## 2017-07-23 NOTE — DISCHARGE INSTRUCTIONS
Cardiac Angioplasty/Stent Discharge Instructions - Radial    After you go home:      Have an adult stay with you until tomorrow.    Drink extra fluids for 2 days.    You may resume your normal diet.    No smoking       For 24 hours - due to the sedation you received:    Relax and take it easy.    Do NOT make any important or legal decisions.    Do NOT drive or operate machines at home or at work.    Do NOT drink alcohol.    Care of Wrist Puncture Site:      For the first 24 hrs - check the puncture site every 1-2 hours while awake.    It is normal to have soreness at the puncture site and mild tingling in your hand for up to 3 days.    Remove the bandaid after 24 hours. If there is minor oozing, apply another bandaid and remove it after 12 hours.    You may shower tomorrow.  Do NOT take a bath, or use a hot tub or pool for at least 3 days. Do NOT scrub the site. Do not use lotion or powder near the puncture site.           Activity:          For 2 days:     do not use your hand or arm to support your weight (such as rising from a chair)     do not bend your wrist (such as lifting a garage door).    do not lift more than 5 pounds or exercise your arm (such as tennis, golf or bowling).    Do NOT do any heavy activity such as exercise, lifting, or straining.     Bleeding:      If you start bleeding from the site in your wrist, sit down and press firmly on/above the site for 10 minutes.     Once bleeding stops, keep arm still for 2 hours.     Call [facility] as soon as you can.       Call 911 right away if you have heavy bleeding or bleeding that does not stop.      Medicines:      If you are taking antiplatelet medications such as Plavix, Brilinta or Effient, do not stop taking it until you talk to your cardiologist.        If you are on Metformin (Glucophage) and your GFR (kidney function level) is >30, you may continue taking your Metformin.    If you are on Metformin (Glucophage) and your GFR (kidney function level)  is <30, do not restart the Metformin for 48 hours after your procedure. Check with your primary care giver before restarting the Metformin to see if you need to have blood drawn to recheck your kidney function (GFR).    Take your medications, including blood thinners, unless your provider tells you not to.  If you take Coumadin (Warfarin), have your INR checked by your provider in  3-5 days. Call your clinic to schedule this.    If you have stopped any medicines, check with your provider about when to restart them.    Follow Up Appointments:      Follow up with [facility] Nurse Practitioner at [facility] of patient preference in 7-10 days.    Cardiac Rehab will contact you for follow up care.    Call the clinic if:      You have a large or growing hard lump around the site.    The site is red, swollen, hot or tender.    Blood or fluid is draining from the site.    You have chills or a fever greater than 101 F (38 C).    Your arm turns feels numb, cool or changes color.    You have hives, a rash or unusual itching.    Any questions or concerns.    Other Instructions:      If you received a stent - carry your stent card with you at all times.      [facility] [place]:    [phone] [facility] (7 days a week)

## 2017-07-23 NOTE — PLAN OF CARE
Problem: Goal Outcome Summary  Goal: Goal Outcome Summary  Outcome: Therapy, progress toward functional goals as expected  Discharge Planner OT   Patient plan for discharge: Home when medically able  Current status:   PT independent in all transfers and ambulation.  Tolerated 19 min on TM with peak of 2.2 mph and 15 steps without sx.  CV responses WNL's.    Barriers to return to prior living situation: none  Recommendations for discharge: Home with Out PT cardiac rehab at [facility].  Rationale for recommendations: PT progressing per protocol post STEMI.  Attentive and involved in education regarding post MI self care and secondary CAD prevention.       Entered by: Watson Vallejo 07/23/2017 12:13 PM

## 2017-07-23 NOTE — PLAN OF CARE
Problem: Goal Outcome Summary  Goal: Goal Outcome Summary  Outcome: No Change  VSS. Tele shows SR. Pt denies any chest pain or SOB. Lt radial remains CDI with no bleeding, hematoma, or bruit noted. Plan is for D/C after 5pm. Will continue to monitor pt.

## 2017-07-24 LAB
INTERPRETATION ECG - MUSE: NORMAL
INTERPRETATION ECG - MUSE: NORMAL

## 2017-07-25 LAB — INTERPRETATION ECG - MUSE: NORMAL

## 2017-07-26 ENCOUNTER — HOSPITAL ENCOUNTER (OUTPATIENT)
Dept: CARDIAC REHAB | Facility: CLINIC | Age: 57
End: 2017-07-26
Attending: INTERNAL MEDICINE
Payer: COMMERCIAL

## 2017-07-26 VITALS — WEIGHT: 239.2 LBS | HEIGHT: 74 IN | BODY MASS INDEX: 30.7 KG/M2

## 2017-07-26 DIAGNOSIS — I21.02 ST ELEVATION MYOCARDIAL INFARCTION INVOLVING LEFT ANTERIOR DESCENDING (LAD) CORONARY ARTERY (H): ICD-10-CM

## 2017-07-26 PROCEDURE — 40000116 ZZH STATISTIC OP CR VISIT

## 2017-07-26 PROCEDURE — 40000575 ZZH STATISTIC OP CARDIAC VISIT #2

## 2017-07-26 PROCEDURE — 93798 PHYS/QHP OP CAR RHAB W/ECG: CPT

## 2017-07-26 PROCEDURE — 93797 PHYS/QHP OP CAR RHAB WO ECG: CPT

## 2017-07-26 ASSESSMENT — 6 MINUTE WALK TEST (6MWT)
MALE CALC: 2103.72
TOTAL DISTANCE WALKED (FT): 2035
PREDICTED: 2116.54
FEMALE CALC: 1610.25
GENDER SELECTION: MALE

## 2017-07-26 NOTE — PROGRESS NOTES
Bowen Tipton  1960  57 year old  STEMI/CINDY 07/26/17 1400   I have established, reviewed and made necessary changes to the individualized treatment plan and exercise prescription for this patient.    Physician Name (printed): ________________________   Date: _______  Time: ______    Physician Signature: ___________________________________________    Session   Session Initial Evaluation and Exercise Prescription   Certified through this date 08/24/17   Cardiac Rehab Assessment   Cardiac Rehab Assessment Pt presents s/p STEMI with DESx1 to LAD. Pt has been doing well post intervention and has returned to work today. He has been having difficulty sleeping. Encouraged him to discuss the sleep aid he takes with his pharmacist to ensure safety with new heart medications. Pt advised to set up f/u appt with cardiology and with PCP. He was given contact phone numbers. Pt is only willing to commit to 1 maybe 2 days per week of cardiac rehab at this time and has doubts about how he will fit exercise into his lifestyle. He has made heart healthy diet changes and has quit smoking. Pt to start cardiac rehab for initiate of exercise program, education on stress management, nutrition and medications.  The patient's history and clinical status including hemodynamics and ECG were evaluated.  The patient was assessed to be stable and appropriate to begin exercise.   The patient's functional capacity and exercise prescription were determined by the completion of the 6 minute walk test.  See results below.  The patient was oriented to the program.  Risk factor profile was completed. Goals and objectives were discussed. CV response was WNL. No symptoms, complaints or pain were reported. Good prognosis for reaching above goals. Skilled therapy is necessary in order to monitor CV response to exercise, to provide education on risk factors and behavior change counseling needed to achieve patient's goals.  Plan to progress to 30-40  minutes of exercise prior to discharge from cardiac rehab.  Initial THR of 20-30 beats above RHR; Effort rating of 4-6.  Initiate muscle conditioning as appropriate.  Provide risk factor education and behavior change counseling.      General Information   Treatment Diagnosis STEMI   Date of Treatment Diagnosis 07/22/17   Secondary Treatment Diagnosis Stent   Significant Past CV History None   Comorbidities None   Other Medical History no other pertinent health history   Lead up symptoms chest pain, general malaise   Hospital Location [facility]   Hospital Discharge Date 07/23/17   Signs and Symptoms Post Hospital Discharge Sleep;Dizziness;Lightheadedness   Outpatient Cardiac Rehab Start Date 07/26/17   Primary Physician Advised to establish - gave # for [facility]   Primary Physician Follow Up Advised to schedule appointment   Cardiologist Dr. Olivera   Cardiologist Follow Up Advised to schedule appointment   Ejection Fraction 50-55%   Risk Stratification Low   Summary of Cath Report   Summary of Cath Report Available   Date Performed 07/22/17   LAD Stent   Cath Report Comments 7/26 Could not find other vessel results in report.    Living and Work Status    Living Arrangements and Social Status house   Support System Live with an adult   Return to Employment Yes   Occupation Realtor    Preventative Medications   CMS recommended medications Beta Blocker;Ace inhibitors;Lipid Lowering;Antiplatelets   Falls Screen   Have you fallen two or more times in the past year? No   Have you fallen and had an injury in the past year? No   Referral Initiated to Physical Therapy No   Pain   Patient Currently in Pain Denies   Physical Assessments   Incisions WNL   Edema None   Limitations No limitations   Individualized Treatment Plan   Monitored Sessions Scheduled 12   Monitored Sessions Attended 1   Oxygen   Supplemental Oxygen needed No   Nutrition Management - Weight Management   Assessment Initial Assessment  "  Age 57   Weight 108.5 kg (239 lb 3.2 oz)   Height 1.88 m (6' 2\")   BMI (Calculated) 30.78   Initial Rate Your Plate Score. Dietary tool to assess eating patterns. Scores range from 24 to 72. The higher the score the healthier the eating pattern. 42   Nutrition Management - Lipids   Lipids Labs Available   Date 07/22/17   Total Cholesterol 183   Triglycerides 235   HDL 34      Prescribed Lipid Medication Yes   Statin Intensity High Intensity   Lipid Comments 7/26 reviewed cholesterol panel with Pt. He verbalized understanding   Nutrition Management - Diabetes   Diabetes No   Nutrition Management Summary   Dietary Recommendations Mediterranean;Low Sodium;Low Cholesterol;Low Fat   Stages of Change for Diet Compliance Action   Interventions Planned Educate on Weight Management Principles;Educate on Benefits of Exercise   Patient Goals Goal #1   Goal #1 Description Pt will lose 1-2# per week.    Nutrition Summary Comments Pt has already made changes to his diet to be more heart healthy. He is monitoring sodium to be less than 2000mg / day which helps with portion control, decreased saturated/trans fat, and is eating more fruits and vegeteables. He is not interested in attending classes at this time.    Nutrition Target Outcome Weight loss .5-1 lb/week (if BMI > 25)   Psychosocial Management   Psychosocial Assessment Initial   Is there history of clinical depression or increased risk of depression? No previous history   Current Level of Stress per Patient Report Moderate    Current Coping Skills Patient Unable to Identify Personal Coping Skills   Initial Patient Health Questionnaire -9 Score (PHQ-9) for depression. 5-9 Minimal symptoms, 10-14 Minor depression, 15-19 Major depression, moderately severe, > 20 Major depression, severe  10   Initial [facility] Survey score.  Quality of Life:   If total score > 25 review individual areas where patient rated a 4 or 5.  Consider patients current medical condition " and what role that plays on the score.   Adjust treatment protocol to improve areas of concern.  Consider the following:  PHQ9 score, DASI, and re-assessment within the next 30 days to assist with developing treatments.  21   Stages of Change Pre-Contemplation   Interventions Planned Patient to verbalize understanding of negative impact of stress to personal health;Patient to verbalize understanding of behavioral assessment results   Psychosocial Comments 7/26 Pt cannot identify coping mechanisms for stress at this time. He says stress is what it is. He is not interested in attending classes at this time.    Psychosocial Target Outcome Maximize coping skills   Other Core Components - Hypertension   History of or Diagnosis of Hypertension Yes   Currently taking Anti-Hypertensives No   Other Core Components - Tobacco   History of Tobacco Use Yes   Quit Date or Planned Quit Date 07/21/17   Tobacco Use Status Recent (Quit < 6 mo ago)   Tobacco Habit Cigarettes   Tobacco Use per Day (average) 3-5 cigarettes per day   Years of Tobacco Use 25   Stages of Change Action   Other Core Components Summary   Interventions Planned Instruct on importance of limiting alcohol intake;Educate on importance of maintaining low sodium diet   Interventions In Progress or Completed Educated on importance of maintaining low sodium diet;Instructed on the importance of limiting alcohol intake   Other Core Components Comments 7/26 Pt used to drink up to 1L of captain a week, but has not drank since his event.    Activity/Exercise History   Activity/Exercise Assessment Initial   Activity/Exercise Status prior to event? Was Physically Active   Number of Days Currently participating in Moderate Physical Activity? 7   Number of Days Currently performing  Aerobic Exercise (including rehab)? 0   Number of Minutes per Session Currently of Aerobic Exercise (average)? 0   Current Stage of Change (Physical Activity) Maintenance   Current Stage of Change  (Aerobic Exercise) Contemplation   Activity/Exercise Comments 7/26 Pt has a physically active lifestyle but is not aerobically active. He is unsure if an exercise program will fit into his lifestyle at this time.   Activity/Exercise Target Outcome An Accumulation of 150  Minutes of Aerobic Activity per Week   Exercise Assessment   6 Minute Walk Predicted - Gender Selection Male   6 Minute Walk Predicted (Male) 2103.72   6 Minute Walk Predicted (Female) 1610.25   Initial 6 Minute Walk Distance (Feet) 2035 ft   Resting HR 64 bpm   Exercise  bpm   Post Exercise HR 70 bpm   Resting /70   Exercise /70   Post Exercise /70   Effort Rating 5   Current MET Level 4   MET Level Goal 5.5-6   ECG Rhythm Normal sinus rhythm   Ectopy None   Current Symptoms Denies symptoms   Limitations/Restrictions Other (see comments)  (no weights till after 8/5 radial angiogram)   Exercise Prescription   Mode Treadmill;Airdyne   Duration/Time 15-30 min   Frequency 2 days/week   THR (85% of age predicted max HR) 139.4   OMNI Effort Rating (0-10 Scale) 4-6/10   Progression Continuous bouts;Total exercise time of 20-30 minutes;Aerobic exercise to OMNI rating of 6 or below and at or below THR;Progress peak intensity by 1/2 MET per week   Recommended Home Exercise   Type of Exercise Walking   Frequency (days per week) 2-3   Duration (minutes per session) 15-30 min   Effort Rating Recommended 4-6/10   30 Day Exercise Plan 7/26 Pt will initiate cardiac rehab 2 days per week for aerobic exercise progression and will supplement with 2-3 days a week of walking outside with omni effort rating of 4-6/10. He will initiate weight training after 8/5   Current Home Exercise   Type of Exercise None   Follow-up/On-going Support   Provider follow-up needed on the following No follow-up needed   Learning Assessment   Learner Patient   Primary Language English   Preferred Learning Style Listening;Reading;Demonstration;Pictures/Video    Barriers to Learning No barriers noted   Patient Education   Education recommended Other (see comment)  (He's not interested in education at this time)

## 2017-08-04 ENCOUNTER — HOSPITAL ENCOUNTER (OUTPATIENT)
Dept: CARDIAC REHAB | Facility: CLINIC | Age: 57
End: 2017-08-04
Attending: INTERNAL MEDICINE
Payer: COMMERCIAL

## 2017-08-04 PROCEDURE — 93798 PHYS/QHP OP CAR RHAB W/ECG: CPT | Performed by: OCCUPATIONAL THERAPIST

## 2017-08-04 PROCEDURE — 40000116 ZZH STATISTIC OP CR VISIT: Performed by: OCCUPATIONAL THERAPIST

## 2017-08-07 ENCOUNTER — HOSPITAL ENCOUNTER (OUTPATIENT)
Dept: CARDIAC REHAB | Facility: CLINIC | Age: 57
End: 2017-08-07
Attending: INTERNAL MEDICINE
Payer: COMMERCIAL

## 2017-08-07 PROCEDURE — 40000116 ZZH STATISTIC OP CR VISIT: Performed by: OCCUPATIONAL THERAPIST

## 2017-08-07 PROCEDURE — 93798 PHYS/QHP OP CAR RHAB W/ECG: CPT | Performed by: OCCUPATIONAL THERAPIST

## 2017-08-10 ENCOUNTER — HOSPITAL ENCOUNTER (OUTPATIENT)
Dept: CARDIAC REHAB | Facility: CLINIC | Age: 57
End: 2017-08-10
Attending: INTERNAL MEDICINE
Payer: COMMERCIAL

## 2017-08-10 VITALS — BODY MASS INDEX: 30.96 KG/M2 | HEIGHT: 74 IN | WEIGHT: 241.2 LBS

## 2017-08-10 PROCEDURE — 93798 PHYS/QHP OP CAR RHAB W/ECG: CPT | Performed by: OCCUPATIONAL THERAPIST

## 2017-08-10 PROCEDURE — 40000116 ZZH STATISTIC OP CR VISIT: Performed by: OCCUPATIONAL THERAPIST

## 2017-08-10 ASSESSMENT — 6 MINUTE WALK TEST (6MWT)
GENDER SELECTION: MALE
TOTAL DISTANCE WALKED (FT): 2035
FEMALE CALC: 1603.7
PREDICTED: 2112.28
MALE CALC: 2099.48

## 2017-08-10 NOTE — PROGRESS NOTES
Bowen Tipton  57 year old  STEMI/ Stent 08/10/17 0800   Session   Session 30 Day Individualized Treatment Plan   Certified through this date 09/23/17   Cardiac Rehab Assessment    Physician cosignature/electronic signature indicates approval of this ITP document. I have established, reviewed and made necessary changes to the individualized treatment plan and exercise prescription for this patient.   Cardiac Rehab Assessment Pt presents s/p STEMI with DESx1 to LAD. Pt has been doing well post intervention and has returned to work today. He has been having difficulty sleeping. Encouraged him to discuss the sleep aid he takes with his pharmacist to ensure safety with new heart medications. Pt advised to set up f/u appt with cardiology and with PCP. He was given contact phone numbers. Pt is only willing to commit to 1 maybe 2 days per week of cardiac rehab at this time and has doubts about how he will fit exercise into his lifestyle. He has made heart healthy diet changes and has quit smoking. Pt to start cardiac rehab for initiate of exercise program, education on stress management, nutrition and medications. 8/10/17 Patient is progressing nicely in cardiac rehab with a stable CV response. Patient's biggest limitation is scheduling rehab around his work schedule. Patient is attempting to schedule 1 session at a time and often our rehab schedule is already full with patients. Encouraged patient to schedule a few weeks out to avoid this issue. Patient is scheduled to see NP tomorrow. Patient reports he was cleared by RN to take his sleeping aid medication. Encouraged patient to discuss this at his appointment tomorrow. Patient continues to benefit from skilled therapy to monitor CV response to exercise, to educate on weight loss and exercise principles to help patient achieve goals.      General Information   Treatment Diagnosis STEMI   Date of Treatment Diagnosis 07/22/17   Secondary Treatment Diagnosis Stent  "  Significant Past CV History None   Comorbidities None   Other Medical History no other pertinent health history   Lead up symptoms chest pain, general malaise   Hospital Location [facility]   Hospital Discharge Date 07/23/17   Signs and Symptoms Post Hospital Discharge Sleep;Dizziness;Lightheadedness   Outpatient Cardiac Rehab Start Date 07/26/17   Primary Physician Advised to establish - gave # for [facility]   Primary Physician Follow Up Advised to schedule appointment   Cardiologist Dr. Olivera   Cardiologist Follow Up Advised to schedule appointment   Ejection Fraction 50-55%   Risk Stratification Low   Summary of Cath Report   Summary of Cath Report Available   Date Performed 07/22/17   LAD Stent   Cath Report Comments 7/26 Could not find other vessel results in report.    Living and Work Status    Living Arrangements and Social Status house   Support System Live with an adult   Return to Employment Yes   Occupation Realtor    Preventative Medications   CMS recommended medications Beta Blocker;Ace inhibitors;Lipid Lowering;Antiplatelets   Falls Screen   Have you fallen two or more times in the past year? No   Have you fallen and had an injury in the past year? No   Referral Initiated to Physical Therapy No   Pain   Patient Currently in Pain Denies   Physical Assessments   Incisions WNL   Edema None   Limitations No limitations   Individualized Treatment Plan   Monitored Sessions Scheduled 12   Monitored Sessions Attended 4   Oxygen   Supplemental Oxygen needed No   Nutrition Management - Weight Management   Assessment Re-assessment   Age 57   Weight 109.4 kg (241 lb 3.2 oz)   Height 1.88 m (6' 2.02\")   BMI (Calculated) 31.02   Initial Rate Your Plate Score. Dietary tool to assess eating patterns. Scores range from 24 to 72. The higher the score the healthier the eating pattern. 42   Nutrition Management - Lipids   Lipids Labs Available   Date 07/22/17   Total Cholesterol 183 "   Triglycerides 235   HDL 34      Prescribed Lipid Medication Yes   Statin Intensity High Intensity   Lipid Comments 7/26 reviewed cholesterol panel with Pt. He verbalized understanding   Nutrition Management - Diabetes   Diabetes No   Nutrition Management Summary   Dietary Recommendations Mediterranean;Low Sodium;Low Cholesterol;Low Fat   Stages of Change for Diet Compliance Action   Interventions Planned Educate on Weight Management Principles;Educate on Benefits of Exercise   Patient Goals Goal #1   Goal #1 Description Pt will lose 1-2# per week.    Goal #1 Progress Towards Goal 8/10/17 Patient has gained 2 pounds while attending rehab despite changing his diet and exercising up to 30 minutes daily. Encouraged patient to increase duration as he feels able and to work on portion control.    Nutrition Summary Comments Pt has already made changes to his diet to be more heart healthy. He is monitoring sodium to be less than 2000mg / day which helps with portion control, decreased saturated/trans fat, and is eating more fruits and vegetables. He is not interested in attending classes at this time.    Nutrition Target Outcome Weight loss .5-1 lb/week (if BMI > 25)   Psychosocial Management   Psychosocial Assessment Re-assessment   Is there history of clinical depression or increased risk of depression? No previous history   Current Level of Stress per Patient Report Moderate    Current Coping Skills Patient Unable to Identify Personal Coping Skills;Has Positive Support System   Initial Patient Health Questionnaire -9 Score (PHQ-9) for depression. 5-9 Minimal symptoms, 10-14 Minor depression, 15-19 Major depression, moderately severe, > 20 Major depression, severe  10   Initial [facility] Survey score.  Quality of Life:   If total score > 25 review individual areas where patient rated a 4 or 5.  Consider patients current medical condition and what role that plays on the score.   Adjust treatment protocol to  improve areas of concern.  Consider the following:  PHQ9 score, DASI, and re-assessment within the next 30 days to assist with developing treatments.  21   Stages of Change Pre-Contemplation   Interventions Planned Patient to verbalize understanding of negative impact of stress to personal health;Patient to verbalize understanding of behavioral assessment results   Psychosocial Comments 7/26 Pt cannot identify coping mechanisms for stress at this time. He says stress is what it is. He is not interested in attending classes at this time. 8/10/17 No change in stress levels and patient remains in pre-contemplation for stage of change. No new coping skills identified. Patient reports he does have a positive support system at home, which is helpful.   Psychosocial Target Outcome Maximize coping skills   Other Core Components - Hypertension   History of or Diagnosis of Hypertension Yes   Currently taking Anti-Hypertensives Yes;Beta blocker;Nitrates;Ace Inhibitor   Hypertension Comments 8/10/17 BP has been WNLs in rehab   Other Core Components - Tobacco   History of Tobacco Use Yes   Quit Date or Planned Quit Date 07/21/17   Tobacco Use Status Recent (Quit < 6 mo ago)   Tobacco Habit Cigarettes   Tobacco Use per Day (average) 3-5 cigarettes per day   Years of Tobacco Use 25   Stages of Change Action   Tobacco Comments 8/10/17 Patient remains in action phase with no issues of relapse. Patient is very confident he will remain smoke free.    Other Core Components Summary   Interventions Planned Instruct on importance of limiting alcohol intake;Educate on importance of maintaining low sodium diet   Interventions In Progress or Completed Educated on importance of maintaining low sodium diet;Instructed on the importance of limiting alcohol intake   Other Core Components Comments 7/26 Pt used to drink up to 1L of captain a week, but has not drank since his event.    Activity/Exercise History   Activity/Exercise Assessment  Re-assessment   Activity/Exercise Status prior to event? Was Physically Active   Number of Days Currently participating in Moderate Physical Activity? 7   Number of Days Currently performing  Aerobic Exercise (including rehab)? 6   Number of Minutes per Session Currently of Aerobic Exercise (average)? 30   Current Stage of Change (Physical Activity) Maintenance   Current Stage of Change (Aerobic Exercise) Action   Activity/Exercise Comments 7/26 Pt has a physically active lifestyle but is not aerobically active. He is unsure if an exercise program will fit into his lifestyle at this time. 8/10/17 Patient is walking daily for up to 30 minutes each session.    Activity/Exercise Target Outcome An Accumulation of 150  Minutes of Aerobic Activity per Week   Exercise Assessment   6 Minute Walk Predicted - Gender Selection Male   6 Minute Walk Predicted (Male) 2099.48   6 Minute Walk Predicted (Female) 1603.7   Initial 6 Minute Walk Distance (Feet) 2035 ft   Resting HR 69 bpm   Exercise  bpm   Post Exercise HR 69 bpm   Resting BP 98/70   Exercise /70   Post Exercise BP 98/70   Effort Rating 4   Current MET Level 4.2   MET Level Goal 5.5-6   ECG Rhythm Normal sinus rhythm   Ectopy None   Current Symptoms Denies symptoms   Limitations/Restrictions None   Exercise Prescription   Mode Treadmill;Airdyne   Duration/Time 15-30 min   Frequency 2 days/week   THR (85% of age predicted max HR) 139.4   OMNI Effort Rating (0-10 Scale) 4-6/10   Progression Continuous bouts;Total exercise time of 20-30 minutes;Aerobic exercise to OMNI rating of 6 or below and at or below THR;Progress peak intensity by 1/2 MET per week   Recommended Home Exercise   Type of Exercise Walking   Frequency (days per week) 2-3   Duration (minutes per session) 15-30 min   Effort Rating Recommended 4-6/10   30 Day Exercise Plan 7/26 Pt will initiate cardiac rehab 2 days per week for aerobic exercise progression and will supplement with 2-3 days a week  of walking outside with omni effort rating of 4-6/10. He will initiate weight training after 8/5   Current Home Exercise   Type of Exercise Walking   Frequency (days per week) 6   Duration (minutes per session) 30   Follow-up/On-going Support   Provider follow-up needed on the following No follow-up needed   Learning Assessment   Learner Patient   Primary Language English   Preferred Learning Style Listening;Reading;Demonstration;Pictures/Video   Barriers to Learning No barriers noted   Patient Education   Education recommended Other (see comment)  (He's not interested in education at this time)   Education classes attended Patient Declined/Refused All Education

## 2017-08-14 ENCOUNTER — OFFICE VISIT (OUTPATIENT)
Dept: CARDIOLOGY | Facility: CLINIC | Age: 57
End: 2017-08-14
Attending: INTERNAL MEDICINE
Payer: COMMERCIAL

## 2017-08-14 VITALS
WEIGHT: 242 LBS | DIASTOLIC BLOOD PRESSURE: 74 MMHG | SYSTOLIC BLOOD PRESSURE: 112 MMHG | HEIGHT: 74 IN | HEART RATE: 63 BPM | BODY MASS INDEX: 31.06 KG/M2

## 2017-08-14 DIAGNOSIS — I21.02 ST ELEVATION MYOCARDIAL INFARCTION INVOLVING LEFT ANTERIOR DESCENDING (LAD) CORONARY ARTERY (H): Primary | ICD-10-CM

## 2017-08-14 PROCEDURE — 99214 OFFICE O/P EST MOD 30 MIN: CPT | Performed by: NURSE PRACTITIONER

## 2017-08-14 NOTE — PROGRESS NOTES
HPI and Plan:   See dictation    Orders Placed This Encounter   Procedures     Lipid Profile     No orders of the defined types were placed in this encounter.    There are no discontinued medications.      Encounter Diagnosis   Name Primary?     ST elevation myocardial infarction involving left anterior descending (LAD) coronary artery (H) Yes       CURRENT MEDICATIONS:  Current Outpatient Prescriptions   Medication Sig Dispense Refill     aspirin 81 MG EC tablet Take 1 tablet (81 mg) by mouth daily 31 tablet 11     nitroGLYcerin (NITROSTAT) 0.4 MG sublingual tablet For chest pain place 1 tablet under the tongue every 5 minutes for 3 doses. If symptoms persist 5 minutes after 1st dose call 911. 25 tablet 3     atorvastatin (LIPITOR) 80 MG tablet Take 1 tablet (80 mg) by mouth At Bedtime 30 tablet 11     metoprolol (LOPRESSOR) 25 MG tablet Take 1 tablet (25 mg) by mouth 2 times daily 60 tablet 11     ticagrelor (BRILINTA) 90 MG tablet Take 1 tablet (90 mg) by mouth every 12 hours 60 tablet 11     lisinopril (PRINIVIL/ZESTRIL) 5 MG tablet Take 1 tablet (5 mg) by mouth daily 30 tablet 11     Omeprazole Magnesium (PRILOSEC OTC PO) Take 20 mg by mouth daily as needed         ALLERGIES     Allergies   Allergen Reactions     Codeine      reaction       PAST MEDICAL HISTORY:  No past medical history on file.    PAST SURGICAL HISTORY:  No past surgical history on file.    FAMILY HISTORY:  Family History   Problem Relation Age of Onset     Colon Cancer Mother      C.A.D. Mother      liver     Myocardial Infarction Father      Heart Failure Father        SOCIAL HISTORY:  Social History     Social History     Marital status:      Spouse name: N/A     Number of children: N/A     Years of education: N/A     Social History Main Topics     Smoking status: Current Some Day Smoker     Types: Cigarettes     Smokeless tobacco: Never Used     Alcohol use Yes      Comment: social     Drug use: No     Sexual activity: Yes      "Partners: Female     Other Topics Concern     Parent/Sibling W/ Cabg, Mi Or Angioplasty Before 65f 55m? No     Social History Narrative       Review of Systems:  Skin:  Negative     Eyes:  Negative    ENT:  Negative    Respiratory:  Negative    Cardiovascular:    Positive for;lightheadedness  Gastroenterology: Negative    Genitourinary:  Negative    Musculoskeletal:  Negative    Neurologic:  Negative    Psychiatric:  Negative    Heme/Lymph/Imm:  Negative    Endocrine:  Negative      Physical Exam:  Vitals: /74  Pulse 63  Ht 1.88 m (6' 2\")  Wt 109.8 kg (242 lb)  BMI 31.07 kg/m2    Constitutional:  cooperative, alert and oriented, well developed, well nourished, in no acute distress        Skin:  warm and dry to the touch, no apparent skin lesions or masses noted        Head:  no masses or lesions        Eyes:  pupils equal and round, conjunctivae and lids unremarkable, sclera white, no xanthalasma, EOMS intact, no nystagmus        ENT:  no pallor or cyanosis        Neck:  carotid pulses are full and equal bilaterally, JVP normal, no carotid bruit, no thyromegaly        Chest:  normal breath sounds, clear to auscultation, normal A-P diameter, normal symmetry, normal respiratory excursion, no use of accessory muscles        Cardiac: regular rhythm, normal S1/S2, no S3 or S4, apical impulse not displaced, no murmurs, gallops or rubs                  Abdomen:  abdomen soft, non-tender, BS normoactive, no mass, no HSM, no bruits        Vascular:                                        Extremities and Back:  no edema        Neurological:  affect appropriate, oriented to time, person and place          Recent Lab Results:  LIPID RESULTS:  Lab Results   Component Value Date    CHOL 183 07/22/2017    HDL 34 (L) 07/22/2017     (H) 07/22/2017    TRIG 235 (H) 07/22/2017       LIVER ENZYME RESULTS:  No results found for: AST, ALT    CBC RESULTS:  Lab Results   Component Value Date    WBC 8.5 07/23/2017    RBC 4.82 " 07/23/2017    HGB 16.6 07/23/2017    HCT 46.7 07/23/2017    MCV 97 07/23/2017    MCH 34.4 (H) 07/23/2017    MCHC 35.5 07/23/2017    RDW 12.5 07/23/2017     07/23/2017       BMP RESULTS:  Lab Results   Component Value Date     07/23/2017    POTASSIUM 4.1 07/23/2017    CHLORIDE 107 07/23/2017    CO2 23 07/23/2017    ANIONGAP 10 07/23/2017     (H) 07/23/2017    BUN 16 07/23/2017    CR 0.86 07/23/2017    GFRESTIMATED >90  Non  GFR Calc   07/23/2017    GFRESTBLACK >90   GFR Calc   07/23/2017    LEOLA 8.6 07/23/2017        A1C RESULTS:  No results found for: A1C    INR RESULTS:  Lab Results   Component Value Date    INR 0.98 07/22/2017           CC  Artie Leach MD  [address] [id number]

## 2017-08-14 NOTE — LETTER
8/14/2017    Artie Leach MD  [address] [place] [address]    RE: Bowen Tipton       Dear Colleague,    I had the pleasure of seeing Bowen Tipton in the [facility].    Mr. Tipton is a 57-year-old gentleman who returns to the [facility] following a recent hospitalization for an anterior ST-elevation myocardial infarction.  He presented with substernal chest pain radiating into his left arm associated with diaphoresis, shortness of breath and nausea.  He presented to the emergency room and his EKG was consistent for acute anterior wall ST-elevation myocardial infarction.  He underwent a coronary angiogram and his LAD was stented.  The final angiogram report is not available for complete review.  In the hospital, there were no complications.  He started cardiac rehabilitation.      His cardiac risk factors include intermittent smoking, hyperlipidemia, alcohol abuse and a family history for coronary artery disease.      Today, he states he has not smoked cigarettes since his heart attack.  He and his wife are trying to improve their diets.  He is avoiding processed foods and canned foods at this point.      Today, he denies chest pain, chest pressure, orthopnea or PND.  He denies shortness of breath with exertion or at rest.  He reports some lightheadedness with position changes only.      MEDICATIONS:  Reviewed.  He is taking them as prescribed.      PHYSICAL EXAMINATION:   VITAL SIGNS:  Blood pressure is 112/74, pulse is 63, weight is 242.   GENERAL:  Patient is alert and oriented.   SKIN:  Warm and dry.  He is in no acute distress.   CARDIAC:  Heart tones are regular with an S1, S2 without an S3, S4 or murmurs.   LUNGS:  Clear without crackles or wheezes.   ABDOMEN:  Soft.   EXTREMITIES:  Lower extremities are free of edema.   Outpatient Encounter Prescriptions as of 8/14/2017   Medication Sig Dispense Refill     aspirin 81 MG EC tablet Take 1  tablet (81 mg) by mouth daily 31 tablet 11     nitroGLYcerin (NITROSTAT) 0.4 MG sublingual tablet For chest pain place 1 tablet under the tongue every 5 minutes for 3 doses. If symptoms persist 5 minutes after 1st dose call 911. 25 tablet 3     atorvastatin (LIPITOR) 80 MG tablet Take 1 tablet (80 mg) by mouth At Bedtime 30 tablet 11     metoprolol (LOPRESSOR) 25 MG tablet Take 1 tablet (25 mg) by mouth 2 times daily 60 tablet 11     ticagrelor (BRILINTA) 90 MG tablet Take 1 tablet (90 mg) by mouth every 12 hours 60 tablet 11     lisinopril (PRINIVIL/ZESTRIL) 5 MG tablet Take 1 tablet (5 mg) by mouth daily 30 tablet 11     Omeprazole Magnesium (PRILOSEC OTC PO) Take 20 mg by mouth daily as needed       No facility-administered encounter medications on file as of 8/14/2017.       IMPRESSION AND PLAN:   1.  ST-elevation myocardial infarction.  The patient appears to be free of symptoms of heart failure, arrhythmias or new ischemia.   2.  Hyperlipidemia.  He is on high-intensity lipoprotein   3.  Tobacco abuse.  He has quit smoking.   4.  Alcohol abuse.  He has cut down.       PLAN:   The patient will continue with his current medications to include aspirin 81 mg per day, Brilinta 90 mg q.12 h., atorvastatin 80 mg nightly, metoprolol 25 mg twice daily, lisinopril 5 mg per day.  The patient will continue with cardiac rehabilitation.  I have asked the patient to exercise 2-3 more days a week outside of his 2 day a week rehabilitation.  The patient will continue to try to improve his diet by sticking with simple non-processed foods.  The Mediterranean diet handout was given to the patient and reviewed.  We will recheck a fasting lipid profile in October before he sees Dr. Hurley.        It has been my pleasure to be involved in his care.     Sincerely,    TRACEY Saul CNP     [facility]

## 2017-08-14 NOTE — MR AVS SNAPSHOT
After Visit Summary   8/14/2017    Bowen Tipton    MRN: [id number]           Patient Information     Date Of Birth          1960        Visit Information        Provider Department      8/14/2017 1:10 PM Natty Raman, TRACEY CNP [facility] [place]        Today's Diagnoses     ST elevation myocardial infarction involving left anterior descending (LAD) coronary artery (H)    -  1      Care Instructions    Lab work the week you see Dr. Hurley to check your cholesterol.  Fast for 10  hours          Follow-ups after your visit        Your next 10 appointments already scheduled     Aug 15, 2017  8:00 AM CDT   Cardiac Treatment with Rh Cardiac Rehab 2   [facility]    [address], [address]  [facility] [address]   [phone]            Aug 17, 2017  8:00 AM CDT   Cardiac Treatment with Rh Cardiac Rehab 1   [facility]    [address], [address]  [facility] [address]   [phone]            Aug 22, 2017  8:00 AM CDT   Cardiac Treatment with Rh Cardiac Rehab 2   [facility]    [address], [address]  [facility] [address]   [phone]            Aug 24, 2017  8:00 AM CDT   Cardiac Treatment with Rh Cardiac Rehab 1   [facility]    [address], [address]  [facility] [address]   [phone]            Aug 25, 2017  8:00 AM CDT   Cardiac Treatment with Rh Cardiac Rehab 1   [facility]    [address], [address]  [facility] [address]   [phone]            Aug 28, 2017  8:00 AM CDT   Cardiac Treatment with Rh Cardiac Rehab 1   [facility]    [address]  [facility] [address]   [phone]            Aug 30, 2017   8:00 AM CDT   Cardiac Treatment with Rh Cardiac Rehab 1   [facility]    [address]  [facility] [address]   [phone]            Sep 01, 2017  8:00 AM CDT   Cardiac Treatment with Rh Cardiac Rehab 1   [facility]    [address], [address]  [facility] [address]   [phone]            Sep 01, 2017  9:00 AM CDT   CONSULT with Rh Cardiac Rehab 1   [facility]    [address]  [facility] [address]   [phone]            Sep 06, 2017  8:00 AM CDT   Cardiac Treatment with Rh Cardiac Rehab 1   [facility]    [address]  [facility] [address]   [phone]              Future tests that were ordered for you today     Open Future Orders        Priority Expected Expires Ordered    Lipid Profile Routine 10/2/2017 8/14/2018 8/14/2017            Who to contact     If you have questions or need follow up information about today's clinic visit or your schedule please contact [facility] [place] directly at [phone].  Normal or non-critical lab and imaging results will be communicated to you by MyChart, letter or phone within 4 business days after the clinic has received the results. If you do not hear from us within 7 days, please contact the clinic through MyChart or phone. If you have a critical or abnormal lab result, we will notify you by phone as soon as possible.  Submit refill requests through [employer] or call your pharmacy and they will forward the refill request to us. Please allow 3 business days for your refill to be completed.          Additional Information About Your Visit        [employer] Information     [employer] lets you send messages to your doctor, view your test results, renew your prescriptions, schedule  "appointments and more. To sign up, go to [email or web address] . Click on \"Log in\" on the left side of the screen, which will take you to the Welcome page. Then click on \"Sign up Now\" on the right side of the page.     You will be asked to enter the access code listed below, as well as some personal information. Please follow the directions to create your username and password.     Your access code is: [email or web address]  Expires: 10/21/2017  4:19 PM     Your access code will  in 90 days. If you need help or a new code, please call your [facility] clinic or [phone].        Care EveryWhere ID     This is your Care EveryWhere ID. This could be used by other organizations to access your [facility] medical records  [id number]        Your Vitals Were     Pulse Height BMI (Body Mass Index)             63 1.88 m (6' 2\") 31.07 kg/m2          Blood Pressure from Last 3 Encounters:   17 112/74   17 112/71   17 (!) 149/102    Weight from Last 3 Encounters:   17 109.8 kg (242 lb)   08/10/17 109.4 kg (241 lb 3.2 oz)   17 108.5 kg (239 lb 3.2 oz)               Primary Care Provider    None Specified       No primary provider on file.        Equal Access to Services     DALTON DE LEON : Hadii shaji willoughby Sojoseph, waaxda luqadaha, qaybta kaalmada adebria, lalo gresham . So [facility] [phone].    ATENCIÓN: Si habla español, tiene a clay disposición servicios gratuitos de asistencia lingüística. Llame al [phone].    We comply with applicable federal civil rights laws and [place] laws. We do not discriminate on the basis of race, color, national origin, age, disability sex, sexual orientation or gender identity.            Thank you!     Thank you for choosing [facility] [place]  for your care. Our goal is always to provide you with excellent care. Hearing back from our patients is one way we can continue to improve our services. " Please take a few minutes to complete the written survey that you may receive in the mail after your visit with us. Thank you!             Your Updated Medication List - Protect others around you: Learn how to safely use, store and throw away your medicines at www.disposemymeds.org.          This list is accurate as of: 8/14/17  1:36 PM.  Always use your most recent med list.                   Brand Name Dispense Instructions for use Diagnosis    aspirin 81 MG EC tablet     31 tablet    Take 1 tablet (81 mg) by mouth daily    ST elevation myocardial infarction involving left anterior descending (LAD) coronary artery (H)       atorvastatin 80 MG tablet    LIPITOR    30 tablet    Take 1 tablet (80 mg) by mouth At Bedtime    ST elevation myocardial infarction involving left anterior descending (LAD) coronary artery (H)       lisinopril 5 MG tablet    PRINIVIL/ZESTRIL    30 tablet    Take 1 tablet (5 mg) by mouth daily    ST elevation myocardial infarction involving left anterior descending (LAD) coronary artery (H)       metoprolol 25 MG tablet    LOPRESSOR    60 tablet    Take 1 tablet (25 mg) by mouth 2 times daily    ST elevation myocardial infarction involving left anterior descending (LAD) coronary artery (H)       nitroGLYcerin 0.4 MG sublingual tablet    NITROSTAT    25 tablet    For chest pain place 1 tablet under the tongue every 5 minutes for 3 doses. If symptoms persist 5 minutes after 1st dose call 911.    ST elevation myocardial infarction involving left anterior descending (LAD) coronary artery (H)       PRILOSEC OTC PO      Take 20 mg by mouth daily as needed        ticagrelor 90 MG tablet    BRILINTA    60 tablet    Take 1 tablet (90 mg) by mouth every 12 hours    ST elevation myocardial infarction involving left anterior descending (LAD) coronary artery (H)

## 2017-08-14 NOTE — PROGRESS NOTES
HISTORY OF PRESENT ILLNESS:  Mr. Tipton is a 57-year-old gentleman who returns to the [facility] following a recent hospitalization for an anterior ST-elevation myocardial infarction.  He presented with substernal chest pain radiating into his left arm associated with diaphoresis, shortness of breath and nausea.  He presented to the emergency room and his EKG was consistent for acute anterior wall ST-elevation myocardial infarction.  He underwent a coronary angiogram and his LAD was stented.  The final angiogram report is not available for complete review.  In the hospital, there were no complications.  He started cardiac rehabilitation.      His cardiac risk factors include intermittent smoking, hyperlipidemia, alcohol abuse and a family history for coronary artery disease.      Today, he states he has not smoked cigarettes since his heart attack.  He and his wife are trying to improve their diets.  He is avoiding processed foods and canned foods at this point.      Today, he denies chest pain, chest pressure, orthopnea or PND.  He denies shortness of breath with exertion or at rest.  He reports some lightheadedness with position changes only.      MEDICATIONS:  Reviewed.  He is taking them as prescribed.      PHYSICAL EXAMINATION:   VITAL SIGNS:  Blood pressure is 112/74, pulse is 63, weight is 242.   GENERAL:  Patient is alert and oriented.   SKIN:  Warm and dry.  He is in no acute distress.   CARDIAC:  Heart tones are regular with an S1, S2 without an S3, S4 or murmurs.   LUNGS:  Clear without crackles or wheezes.   ABDOMEN:  Soft.   EXTREMITIES:  Lower extremities are free of edema.      IMPRESSION AND PLAN:   1.  ST-elevation myocardial infarction.  The patient appears to be free of symptoms of heart failure, arrhythmias or new ischemia.   2.  Hyperlipidemia.  He is on high-intensity lipoprotein   3.  Tobacco abuse.  He has quit smoking.   4.  Alcohol abuse.  He has cut down.        PLAN:   The patient will continue with his current medications to include aspirin 81 mg per day, Brilinta 90 mg q.12 h., atorvastatin 80 mg nightly, metoprolol 25 mg twice daily, lisinopril 5 mg per day.  The patient will continue with cardiac rehabilitation.  I have asked the patient to exercise 2-3 more days a week outside of his 2 day a week rehabilitation.  The patient will continue to try to improve his diet by sticking with simple non-processed foods.  The Mediterranean diet handout was given to the patient and reviewed.  We will recheck a fasting lipid profile in October before he sees Dr. Hurley.        It has been my pleasure to be involved in his care.      TRACEY Saul, TRACEY EVANS, CNP             D: 2017 13:47   T: 2017 14:17   MT: MECHELLE      Name:     ELEAZAR MITCHELL   MRN:      [id number]        Account:      [id number]   :      1960           Service Date: 2017      Document: [id number]

## 2017-08-15 ENCOUNTER — HOSPITAL ENCOUNTER (OUTPATIENT)
Dept: CARDIAC REHAB | Facility: CLINIC | Age: 57
End: 2017-08-15
Attending: INTERNAL MEDICINE
Payer: COMMERCIAL

## 2017-08-15 PROCEDURE — 40000116 ZZH STATISTIC OP CR VISIT

## 2017-08-15 PROCEDURE — 93798 PHYS/QHP OP CAR RHAB W/ECG: CPT

## 2017-08-22 ENCOUNTER — HOSPITAL ENCOUNTER (OUTPATIENT)
Dept: CARDIAC REHAB | Facility: CLINIC | Age: 57
End: 2017-08-22
Attending: INTERNAL MEDICINE
Payer: COMMERCIAL

## 2017-08-22 PROCEDURE — 93798 PHYS/QHP OP CAR RHAB W/ECG: CPT

## 2017-08-22 PROCEDURE — 40000116 ZZH STATISTIC OP CR VISIT

## 2017-08-26 ENCOUNTER — APPOINTMENT (OUTPATIENT)
Dept: GENERAL RADIOLOGY | Facility: CLINIC | Age: 57
End: 2017-08-26
Attending: EMERGENCY MEDICINE
Payer: COMMERCIAL

## 2017-08-26 ENCOUNTER — HOSPITAL ENCOUNTER (EMERGENCY)
Facility: CLINIC | Age: 57
Discharge: LEFT BEFORE DISCHARGE | End: 2017-08-26
Attending: EMERGENCY MEDICINE | Admitting: EMERGENCY MEDICINE
Payer: COMMERCIAL

## 2017-08-26 VITALS
DIASTOLIC BLOOD PRESSURE: 86 MMHG | BODY MASS INDEX: 30.16 KG/M2 | RESPIRATION RATE: 12 BRPM | WEIGHT: 235 LBS | HEIGHT: 74 IN | SYSTOLIC BLOOD PRESSURE: 130 MMHG | OXYGEN SATURATION: 95 %

## 2017-08-26 DIAGNOSIS — F41.9 ANXIETY: ICD-10-CM

## 2017-08-26 DIAGNOSIS — R07.9 ACUTE CHEST PAIN: ICD-10-CM

## 2017-08-26 LAB
ANION GAP SERPL CALCULATED.3IONS-SCNC: 10 MMOL/L (ref 3–14)
APTT PPP: 33 SEC (ref 22–37)
BASOPHILS # BLD AUTO: 0 10E9/L (ref 0–0.2)
BASOPHILS NFR BLD AUTO: 0.8 %
BUN SERPL-MCNC: 19 MG/DL (ref 7–30)
CALCIUM SERPL-MCNC: 9.1 MG/DL (ref 8.5–10.1)
CHLORIDE SERPL-SCNC: 107 MMOL/L (ref 94–109)
CO2 SERPL-SCNC: 22 MMOL/L (ref 20–32)
CREAT SERPL-MCNC: 0.84 MG/DL (ref 0.66–1.25)
DIFFERENTIAL METHOD BLD: ABNORMAL
EOSINOPHIL # BLD AUTO: 0.4 10E9/L (ref 0–0.7)
EOSINOPHIL NFR BLD AUTO: 7.9 %
ERYTHROCYTE [DISTWIDTH] IN BLOOD BY AUTOMATED COUNT: 11.7 % (ref 10–15)
GFR SERPL CREATININE-BSD FRML MDRD: >90 ML/MIN/1.7M2
GLUCOSE SERPL-MCNC: 169 MG/DL (ref 70–99)
HCT VFR BLD AUTO: 44.1 % (ref 40–53)
HGB BLD-MCNC: 15.7 G/DL (ref 13.3–17.7)
IMM GRANULOCYTES # BLD: 0 10E9/L (ref 0–0.4)
IMM GRANULOCYTES NFR BLD: 0.2 %
INR PPP: 1.05 (ref 0.86–1.14)
INTERPRETATION ECG - MUSE: NORMAL
LYMPHOCYTES # BLD AUTO: 1.4 10E9/L (ref 0.8–5.3)
LYMPHOCYTES NFR BLD AUTO: 30.1 %
MAGNESIUM SERPL-MCNC: 2.1 MG/DL (ref 1.6–2.3)
MCH RBC QN AUTO: 33.2 PG (ref 26.5–33)
MCHC RBC AUTO-ENTMCNC: 35.6 G/DL (ref 31.5–36.5)
MCV RBC AUTO: 93 FL (ref 78–100)
MONOCYTES # BLD AUTO: 0.3 10E9/L (ref 0–1.3)
MONOCYTES NFR BLD AUTO: 7.2 %
NEUTROPHILS # BLD AUTO: 2.5 10E9/L (ref 1.6–8.3)
NEUTROPHILS NFR BLD AUTO: 53.8 %
NRBC # BLD AUTO: 0 10*3/UL
NRBC BLD AUTO-RTO: 0 /100
PLATELET # BLD AUTO: 175 10E9/L (ref 150–450)
POTASSIUM SERPL-SCNC: 3.8 MMOL/L (ref 3.4–5.3)
RBC # BLD AUTO: 4.73 10E12/L (ref 4.4–5.9)
SODIUM SERPL-SCNC: 139 MMOL/L (ref 133–144)
TROPONIN I BLD-MCNC: 0.01 UG/L (ref 0–0.1)
TROPONIN I SERPL-MCNC: <0.015 UG/L (ref 0–0.04)
TROPONIN I SERPL-MCNC: <0.015 UG/L (ref 0–0.04)
WBC # BLD AUTO: 4.7 10E9/L (ref 4–11)

## 2017-08-26 PROCEDURE — 96360 HYDRATION IV INFUSION INIT: CPT

## 2017-08-26 PROCEDURE — 83735 ASSAY OF MAGNESIUM: CPT | Performed by: EMERGENCY MEDICINE

## 2017-08-26 PROCEDURE — 99285 EMERGENCY DEPT VISIT HI MDM: CPT | Mod: 25

## 2017-08-26 PROCEDURE — 85025 COMPLETE CBC W/AUTO DIFF WBC: CPT | Performed by: EMERGENCY MEDICINE

## 2017-08-26 PROCEDURE — 25000128 H RX IP 250 OP 636: Performed by: EMERGENCY MEDICINE

## 2017-08-26 PROCEDURE — 85610 PROTHROMBIN TIME: CPT | Performed by: EMERGENCY MEDICINE

## 2017-08-26 PROCEDURE — 36415 COLL VENOUS BLD VENIPUNCTURE: CPT | Performed by: EMERGENCY MEDICINE

## 2017-08-26 PROCEDURE — 71020 XR CHEST 2 VW: CPT

## 2017-08-26 PROCEDURE — 85730 THROMBOPLASTIN TIME PARTIAL: CPT | Performed by: EMERGENCY MEDICINE

## 2017-08-26 PROCEDURE — 84484 ASSAY OF TROPONIN QUANT: CPT | Performed by: EMERGENCY MEDICINE

## 2017-08-26 PROCEDURE — 93005 ELECTROCARDIOGRAM TRACING: CPT

## 2017-08-26 PROCEDURE — 80048 BASIC METABOLIC PNL TOTAL CA: CPT | Performed by: EMERGENCY MEDICINE

## 2017-08-26 PROCEDURE — 84484 ASSAY OF TROPONIN QUANT: CPT

## 2017-08-26 PROCEDURE — 25000132 ZZH RX MED GY IP 250 OP 250 PS 637: Performed by: EMERGENCY MEDICINE

## 2017-08-26 RX ORDER — ASPIRIN 81 MG/1
243 TABLET, CHEWABLE ORAL ONCE
Status: COMPLETED | OUTPATIENT
Start: 2017-08-26 | End: 2017-08-26

## 2017-08-26 RX ADMIN — SODIUM CHLORIDE 1000 ML: 9 INJECTION, SOLUTION INTRAVENOUS at 10:47

## 2017-08-26 RX ADMIN — ASPIRIN 81 MG 243 MG: 81 TABLET ORAL at 10:47

## 2017-08-26 ASSESSMENT — ENCOUNTER SYMPTOMS
DYSURIA: 0
DIARRHEA: 0
NAUSEA: 0
HEMATURIA: 0
FEVER: 0
HEADACHES: 0
ABDOMINAL PAIN: 0
BLOOD IN STOOL: 0
VOMITING: 0
COUGH: 0

## 2017-08-26 NOTE — ED AVS SNAPSHOT
[facility] Emergency Department    [address]    [facility] [address]    Phone:  [phone]    Fax:  [phone]                                       Bowen Tipton   MRN: [id number]    Department:  [facility] Emergency Department   Date of Visit:  8/26/2017           Patient Information     Date Of Birth          1960        Your diagnoses for this visit were:     Acute chest pain     Anxiety        You were seen by Fei Longoria MD.      Follow-up Information     Follow up with [facility] [place]. Schedule an appointment as soon as possible for a visit in 2 days.    Why:  For close follow up and further testing of your heart    Contact information:    [address] [facility] [address]  [facility] [address]          Follow up with Primary Care Physician. Schedule an appointment as soon as possible for a visit in 2 days.    Why:  For close follow up        Discharge Instructions       You had an episode of chest pain that was concerning for a possible heart problem including heart attack, or coronary stent problem. Your testing in the ED was reassuring however a serious heart problem could not completely be ruled out. It was recommended that you stay for a period of observation and further testing however you declined. Make sure to follow up as soon as possible with your Cardiologist and Primary Care Physician. You are welcome to return to the Emergency Department at any time if you have worsening or recurrent symptoms. Return precautions are provided below.      Discharge Instructions  Chest Pain    You have been seen today for chest pain or discomfort.  At this time, your provider has found no signs that your chest pain is due to a serious or life-threatening condition, (or you have declined more testing and/or admission to the hospital). However, sometimes there is a serious problem that does not show up right away.  Your evaluation today may not be complete and you may need further testing and evaluation.     Generally, every Emergency Department visit should have a follow-up clinic visit with either a primary or a specialty clinic/provider. Please follow-up as instructed by your emergency provider today.  Return to the Emergency Department if:    Your chest pain changes, gets worse, starts to happen more often, or comes with less activity.    You are newly short of breath.    You get very weak or tired.    You pass out or faint.    You have any new symptoms, like fever, cough, numb legs, or you cough up blood.    You have anything else that worries you.    Until you follow-up with your regular provider, please do the following:    Take one aspirin daily unless you have an allergy or are told not to by your provider.    If a stress test appointment has been made, go to the appointment.    If you have questions, contact your regular provider.    Follow-up with your regular provider/clinic as directed; this is very important.    If you were given a prescription for medicine here today, be sure to read all of the information (including the package insert) that comes with your prescription.  This will include important information about the medicine, its side effects, and any warnings that you need to know about.  The pharmacist who fills the prescription can provide more information and answer questions you may have about the medicine.  If you have questions or concerns that the pharmacist cannot address, please call or return to the Emergency Department.       Remember that you can always come back to the Emergency Department if you are not able to see your regular provider in the amount of time listed above, if you get any new symptoms, or if there is anything that worries you.      Future Appointments        Provider Department Dept Phone Center    8/28/2017 8:00 AM [facility] [phone]  [facility]    8/30/2017 8:00 AM [facility] [phone] [facility]    9/1/2017 8:00 AM [facility] [phone] [facility]    9/1/2017 9:00 AM [facility] [phone] [facility]    9/6/2017 8:00 AM [facility] [phone] [facility]    9/8/2017 8:00 AM [facility] [phone] [facility]    9/11/2017 8:00 AM [facility] [phone] [facility]    9/13/2017 8:00 AM [facility] [phone] [facility]    9/15/2017 8:00 AM [facility] [phone] [facility]    9/18/2017 8:00 AM [facility] [phone] [facility]    9/20/2017 8:00 AM [facility] [phone] [facility]    9/22/2017 8:00 AM [facility] [phone] [facility]    9/25/2017 8:00 AM [facility] [phone] [facility]    9/27/2017 8:00 AM [facility] [phone] [facility]    9/29/2017 8:00 AM [facility] [phone] [facility]    10/3/2017 9:00 AM [facility] [place] [phone] [facility]    10/4/2017 3:45 PM Brandon Hurley MD [facility] [place] [phone] [facility]      24 Hour Appointment Hotline       To make an appointment at any [facility], call [phone] ([phone]). If you don't have a family doctor or clinic, we will help you find one. [facility] clinics are conveniently located to serve the needs of you and your family.             Review of your medicines      Our records  show that you are taking the medicines listed below. If these are incorrect, please call your family doctor or clinic.        Dose / Directions Last dose taken    aspirin 81 MG EC tablet   Dose:  81 mg   Quantity:  31 tablet        Take 1 tablet (81 mg) by mouth daily   Refills:  11        atorvastatin 80 MG tablet   Commonly known as:  LIPITOR   Dose:  80 mg   Quantity:  30 tablet        Take 1 tablet (80 mg) by mouth At Bedtime   Refills:  11        lisinopril 5 MG tablet   Commonly known as:  PRINIVIL/ZESTRIL   Dose:  5 mg   Quantity:  30 tablet        Take 1 tablet (5 mg) by mouth daily   Refills:  11        metoprolol 25 MG tablet   Commonly known as:  LOPRESSOR   Dose:  25 mg   Quantity:  60 tablet        Take 1 tablet (25 mg) by mouth 2 times daily   Refills:  11        nitroGLYcerin 0.4 MG sublingual tablet   Commonly known as:  NITROSTAT   Quantity:  25 tablet        For chest pain place 1 tablet under the tongue every 5 minutes for 3 doses. If symptoms persist 5 minutes after 1st dose call 911.   Refills:  3        PRILOSEC OTC PO   Dose:  20 mg        Take 20 mg by mouth daily as needed   Refills:  0        ticagrelor 90 MG tablet   Commonly known as:  BRILINTA   Dose:  90 mg   Quantity:  60 tablet        Take 1 tablet (90 mg) by mouth every 12 hours   Refills:  11                Procedures and tests performed during your visit     Basic metabolic panel    CBC with platelets differential    Cardiac Continuous Monitoring    EKG 12 lead    INR    Magnesium    Partial thromboplastin time    Peripheral IV: Standard    Pulse oximetry nursing    Troponin I    Troponin I (now)    Troponin POCT    XR Chest 2 Views      Orders Needing Specimen Collection     None      Pending Results     Date and Time Order Name Status Description    8/26/2017 1009 EKG 12 lead Preliminary             Pending Culture Results     No orders found from 8/24/2017 to 8/27/2017.            Pending Results Instructions     If you had any  lab results that were not finalized at the time of your Discharge, you can call the ED Lab Result RN at [phone]. You will be contacted by this team for any positive Lab results or changes in treatment. The nurses are available 7 days a week from 10A to 6:30P.  You can leave a message 24 hours per day and they will return your call.        Test Results From Your Hospital Stay        8/26/2017 10:36 AM      Component Results     Component Value Ref Range & Units Status    Troponin I 0.01 0.00 - 0.10 ug/L Final         8/26/2017 10:48 AM      Component Results     Component Value Ref Range & Units Status    WBC 4.7 4.0 - 11.0 10e9/L Final    RBC Count 4.73 4.4 - 5.9 10e12/L Final    Hemoglobin 15.7 13.3 - 17.7 g/dL Final    Hematocrit 44.1 40.0 - 53.0 % Final    MCV 93 78 - 100 fl Final    MCH 33.2 (H) 26.5 - 33.0 pg Final    MCHC 35.6 31.5 - 36.5 g/dL Final    RDW 11.7 10.0 - 15.0 % Final    Platelet Count 175 150 - 450 10e9/L Final    Diff Method Automated Method  Final    % Neutrophils 53.8 % Final    % Lymphocytes 30.1 % Final    % Monocytes 7.2 % Final    % Eosinophils 7.9 % Final    % Basophils 0.8 % Final    % Immature Granulocytes 0.2 % Final    Nucleated RBCs 0 0 /100 Final    Absolute Neutrophil 2.5 1.6 - 8.3 10e9/L Final    Absolute Lymphocytes 1.4 0.8 - 5.3 10e9/L Final    Absolute Monocytes 0.3 0.0 - 1.3 10e9/L Final    Absolute Eosinophils 0.4 0.0 - 0.7 10e9/L Final    Absolute Basophils 0.0 0.0 - 0.2 10e9/L Final    Abs Immature Granulocytes 0.0 0 - 0.4 10e9/L Final    Absolute Nucleated RBC 0.0  Final         8/26/2017 11:07 AM      Component Results     Component Value Ref Range & Units Status    Sodium 139 133 - 144 mmol/L Final    Potassium 3.8 3.4 - 5.3 mmol/L Final    Chloride 107 94 - 109 mmol/L Final    Carbon Dioxide 22 20 - 32 mmol/L Final    Anion Gap 10 3 - 14 mmol/L Final    Glucose 169 (H) 70 - 99 mg/dL Final    Urea Nitrogen 19 7 - 30 mg/dL Final    Creatinine 0.84 0.66 - 1.25 mg/dL  Final    GFR Estimate >90 >60 mL/min/1.7m2 Final    Non  GFR Calc    GFR Estimate If Black >90 >60 mL/min/1.7m2 Final    African American GFR Calc    Calcium 9.1 8.5 - 10.1 mg/dL Final         8/26/2017 11:07 AM      Component Results     Component Value Ref Range & Units Status    Troponin I ES <0.015 0.000 - 0.045 ug/L Final    The 99th percentile for upper reference range is 0.045 ug/L.  Troponin values   in the range of 0.045 - 0.120 ug/L may be associated with risks of adverse   clinical events.           8/26/2017 10:58 AM      Component Results     Component Value Ref Range & Units Status    INR 1.05 0.86 - 1.14 Final         8/26/2017 10:58 AM      Component Results     Component Value Ref Range & Units Status    PTT 33 22 - 37 sec Final         8/26/2017 11:07 AM      Component Results     Component Value Ref Range & Units Status    Magnesium 2.1 1.6 - 2.3 mg/dL Final         8/26/2017 11:36 AM      Narrative     CHEST TWO VIEWS  8/26/2017 11:12 AM     COMPARISON: Frontal chest x-ray 7/22/2017.    HISTORY: Chest pain.    FINDINGS: The cardiac silhouette, pulmonary vasculature, lungs and  pleural spaces are within normal limits.        Impression     IMPRESSION: Clear lungs.     JLUIA WEEKS MD         8/26/2017  1:48 PM      Component Results     Component Value Ref Range & Units Status    Troponin I ES <0.015 0.000 - 0.045 ug/L Final    The 99th percentile for upper reference range is 0.045 ug/L.  Troponin values   in the range of 0.045 - 0.120 ug/L may be associated with risks of adverse   clinical events.                  Clinical Quality Measure: Blood Pressure Screening     Your blood pressure was checked while you were in the emergency department today. The last reading we obtained was  BP: 130/86 . Please read the guidelines below about what these numbers mean and what you should do about them.  If your systolic blood pressure (the top number) is less than 120 and your diastolic  "blood pressure (the bottom number) is less than 80, then your blood pressure is normal. There is nothing more that you need to do about it.  If your systolic blood pressure (the top number) is 120-139 or your diastolic blood pressure (the bottom number) is 80-89, your blood pressure may be higher than it should be. You should have your blood pressure rechecked within a year by a primary care provider.  If your systolic blood pressure (the top number) is 140 or greater or your diastolic blood pressure (the bottom number) is 90 or greater, you may have high blood pressure. High blood pressure is treatable, but if left untreated over time it can put you at risk for heart attack, stroke, or kidney failure. You should have your blood pressure rechecked by a primary care provider within the next 4 weeks.  If your provider in the emergency department today gave you specific instructions to follow-up with your doctor or provider even sooner than that, you should follow that instruction and not wait for up to 4 weeks for your follow-up visit.        Thank you for choosing [facility]       Thank you for choosing [facility] for your care. Our goal is always to provide you with excellent care. Hearing back from our patients is one way we can continue to improve our services. Please take a few minutes to complete the written survey that you may receive in the mail after you visit with us. Thank you!        SMART Information     SMART lets you send messages to your doctor, view your test results, renew your prescriptions, schedule appointments and more. To sign up, go to [email or web address] . Click on \"Log in\" on the left side of the screen, which will take you to the Welcome page. Then click on \"Sign up Now\" on the right side of the page.     You will be asked to enter the access code listed below, as well as some personal information. Please follow the directions to create your username and password.     Your access code " is: [email or web address]  Expires: 10/21/2017  4:19 PM     Your access code will  in 90 days. If you need help or a new code, please call your [place] clinic or [phone].        Care EveryWhere ID     This is your Care EveryWhere ID. This could be used by other organizations to access your [facility] medical records  [id number]        Equal Access to Services     [facility] Hospital[facility] : Hadii shaji mccraryo Sojoseph, waaxda lueugenia, qaybta kaalmada adebria, lalo gresham . So [facility] [phone].    ATENCIÓN: Si habla español, tiene a clay disposición servicios gratuitos de asistencia lingüística. Llame al [phone].    We comply with applicable federal civil rights laws and [place] laws. We do not discriminate on the basis of race, color, national origin, age, disability sex, sexual orientation or gender identity.            After Visit Summary       This is your record. Keep this with you and show to your community pharmacist(s) and doctor(s) at your next visit.

## 2017-08-26 NOTE — ED PROVIDER NOTES
"  History     Chief Complaint:  Lightheaded/\"feeling weird\"     HPI   Bowen Tipton is a 57 year old male with a history of STEMI (7/22/2017) with subsequent stent placement who presents to the ED for lightheadedness/\"feeling weird.\" The patient reports that half an hour prior to evaluation, he began feeling faint and \"weird, discombobulated\" while sitting down. He took 1 nitro but felt a slight improved. He was concerned as his recent STEMI had been preceded with similar symptoms. The patient reports to taking his medications this morning, and since his previous STEMI he has been doing well with diet and exercise; he notes that he is lightheaded after exercising at times, but this does not concern him too much. He denies any drug use, and denies any nausea, vomiting, headache, visual changes, chest pain, cough, fever, abdominal pain, dysuria, hematuria, blood in the stool, diarrhea, or leg pain/swelling.     Allergies:  NDKA     Medications:    Aspirin   Nitroglycerin   Lipitor  Metoprolol  Brilinta  Lisinopril  Prilosec      Past Medical History:    STEMI - 7/22/2017    Past Surgical History:    Coronary Angiography, Percutaneous Coronary Intervention - 7/22/2017    Family History:    Mother - colon cancer, CAD  Father - myocardial infarction, heart failure     Social History:  The patient was accompanied to the ED by his daughter.  Smoking Status: Current some day smoker, cigarettes   Smokeless Tobacco: Never used   Alcohol Use: Yes, social   Marital Status:       Review of Systems   Constitutional: Negative for fever.        \"Felt weird and discombobulated\"    Eyes: Negative for visual disturbance.   Respiratory: Negative for cough.    Cardiovascular: Negative for chest pain and leg swelling.   Gastrointestinal: Negative for abdominal pain, blood in stool, diarrhea, nausea and vomiting.   Genitourinary: Negative for dysuria and hematuria.   Musculoskeletal:        Negative for any leg pain. " "  Neurological: Negative for headaches.        Positive for \"feeling faint\"   All other systems reviewed and are negative.    Physical Exam   First Vitals: /86  Resp 12  Ht 1.88 m (6' 2\")  Wt 106.6 kg (235 lb)  SpO2 95%  BMI 30.17 kg/m2        Physical Exam  General: Appears anxious   Head:  Scalp, face, and head appear normal  Eyes:  Pupils are equal, round, and reactive to light    No nystagmus    Conjunctivae non-injected and sclerae white  ENT:    The external nose is normal    Pinnae are normal    The oropharynx is normal, mucous membranes moist    Uvula is in the midline  Neck:  Normal range of motion    There is no rigidity noted    Trachea is in the midline  CV:  Regular rate and rhythm     Normal S1/S2, no S3/S4    No murmur or rub    Peripheral pulses are 2+ symmetric   Resp:  Lungs are clear and equal bilaterally    There is no tachypnea    No increased work of breathing    No rales, wheezing, or rhonchi  GI:  Abdomen is soft, no rigidity or guarding    No distension, or mass    No rebound tenderness   MS:  Normal muscular tone    Symmetric motor strength    No lower extremity edema  Skin:  No rash or acute skin lesions noted    Has four areas of old appearing ecchymosis on the left lateral and posterior abdominal wall. Nontender, no erythema.   Neuro: Awake and alert    Speech is normal and fluent    Moves all extremities spontaneously  Psych:  Normal affect.  Appropriate interactions.    Emergency Department Course   ECG done at 10:13. ECG read at 10:15:  Rate 72 bpm. NV interval 148. QRS duration 94. QT/QTc 402/440. P-R-T axes 42 24 15.  Normal sinus rhythm. Normal ECG. No significant change compared to EKG dated 7/22/2017.     Imaging:  Radiographic findings were communicated with the patient who voiced understanding of the findings.  XR Chest 2 Views:  Clear lungs.   Per radiology.     Laboratory:  (1036) Troponin POCT: 0.01  (1107) Troponin: <0.015  (1348) Troponin: <0.015  CBC: All WNL " (WBC 4.7, HGB 15.7, )   BMP: Glucose 169 high, o/w WNL (Creatinine 0.84)   INR: 1.05  PTT: 33  Magnesium: 2.1    Interventions:  (1047) Sodium chloride bolus 1000 mL IV  (1047) Aspirin 243 mg PO    Emergency Department Course:  Nursing notes and vitals reviewed.  I performed an exam of the patient as documented above.     Blood drawn. This was sent to the lab for further testing, results above.  The patient was sent for an XR while here in the emergency department, findings above.     The patient was given the above interventions.     (1145) I rechecked the patient. He feels better and his symptoms are resolving. We discussed the risks of stent occlusion or recurrent MI, given that his symptoms today were similar to when he had a heart attack. He understands, and states he will stay for a repeat troponin, but adamantly declines any prolonged observation or admission. We discussed risks and benefits of this, including death, heart attack, dysrhythmia, or permanent disability. He understands, and is able to make his own medical decisions. He understands he will have to sign out AMA if he chooses to go home.     At this point, Bowen Tipton declined my recommended care and insisted upon discharge. I discussed with the patient my diagnostic impression, recommended treatment, and alternatives to treatment. I discussed with them what I anticipated could happen if the patient were discharged including death. The patient was able to understand this explanation and ask appropriate questions. In my opinion this patient does have capacity to decide to leave Against Medical Advice, and I have asked him to sign a form indicating that decision. I have given the patient thorough discharge instructions and have prescribed all appropriate treatment. I have invited the patient to return here at any time if he decides to have further evaluation or treatment, regardless of his ability to pay.     Impression & Plan      Medical  Decision Making:  Bowen Tipton is a 57 year old male who comes to the ED. He has a history of recent STEMI, on Brilinta and aspirin, presents today with symptoms similar to his prior episode when he had a STEMI - not chest pain, per se, but dyspnea and feeling  different  and sweating. Differential diagnosis includes restenosis, ACS. Patient has no infectious symptoms to suggest pneumonia. Pericarditis on the differential, however EKG is not consistent with this. Other complications of recent STEMI, including Dressler s syndrome and ventricular aneurysm or papillary muscle rupture considered, however the patient is hemodynamically stable with no evidence of vascular congestion or other sequelae of these serious complications. His history and physical examination is not consistent with aortic dissection. The plan will be for labs, EKG, chest x-ray, and admission versus observation for provocative testing given his risk factors and recent, now known, coronary disease. When I discussed the plan to keep the patient for either observation or admission, the patient adamantly refused, stating that he would not consider this. Please see the note above for the AMA discussion. Will obtain second troponin as the patient is agreeable with this. Second troponin was negative.    Return precautions were discussed with patient. The patient's questions were answered prior to discharge      Diagnosis:    ICD-10-CM    1. Acute chest pain R07.9    2. Anxiety F41.9        Disposition:  Patient left Against Medical Advice.     Andrea BAZAN, am serving as a scribe at 10:09 AM on 8/26/2017 to document services personally performed by Fei Longoria MD based on my observations and the provider's statements to me.   [facility] EMERGENCY DEPARTMENT       Fei Longoria MD  08/26/17 [id number]

## 2017-08-26 NOTE — ED NOTES
"Sudden onset of shortness of breath, left arm \"feeling funny\", hyperventilation 30 minutes prior to arrival.  Patient was sitting down when it started.  MI 1 month ago.  ABCDs intact.  Patient upset, yelling, swearing.  "

## 2017-08-26 NOTE — DISCHARGE INSTRUCTIONS
You had an episode of chest pain that was concerning for a possible heart problem including heart attack, or coronary stent problem. Your testing in the ED was reassuring however a serious heart problem could not completely be ruled out. It was recommended that you stay for a period of observation and further testing however you declined. Make sure to follow up as soon as possible with your Cardiologist and Primary Care Physician. You are welcome to return to the Emergency Department at any time if you have worsening or recurrent symptoms. Return precautions are provided below.      Discharge Instructions  Chest Pain    You have been seen today for chest pain or discomfort.  At this time, your provider has found no signs that your chest pain is due to a serious or life-threatening condition, (or you have declined more testing and/or admission to the hospital). However, sometimes there is a serious problem that does not show up right away. Your evaluation today may not be complete and you may need further testing and evaluation.     Generally, every Emergency Department visit should have a follow-up clinic visit with either a primary or a specialty clinic/provider. Please follow-up as instructed by your emergency provider today.  Return to the Emergency Department if:    Your chest pain changes, gets worse, starts to happen more often, or comes with less activity.    You are newly short of breath.    You get very weak or tired.    You pass out or faint.    You have any new symptoms, like fever, cough, numb legs, or you cough up blood.    You have anything else that worries you.    Until you follow-up with your regular provider, please do the following:    Take one aspirin daily unless you have an allergy or are told not to by your provider.    If a stress test appointment has been made, go to the appointment.    If you have questions, contact your regular provider.    Follow-up with your regular provider/clinic as  directed; this is very important.    If you were given a prescription for medicine here today, be sure to read all of the information (including the package insert) that comes with your prescription.  This will include important information about the medicine, its side effects, and any warnings that you need to know about.  The pharmacist who fills the prescription can provide more information and answer questions you may have about the medicine.  If you have questions or concerns that the pharmacist cannot address, please call or return to the Emergency Department.       Remember that you can always come back to the Emergency Department if you are not able to see your regular provider in the amount of time listed above, if you get any new symptoms, or if there is anything that worries you.

## 2017-08-26 NOTE — ED AVS SNAPSHOT
[facility]    [address]    [facility] [address]    Phone:  [phone]    Fax:  [phone]                                       Bowen Tipton   MRN: [id number]    Department:  [facility] Emergency [facility]   Date of Visit:  8/26/2017           After Visit Summary Signature Page     I have received my discharge instructions, and my questions have been answered. I have discussed any challenges I see with this plan with the nurse or doctor.    ..........................................................................................................................................  Patient/Patient Representative Signature      ..........................................................................................................................................  Patient Representative Print Name and Relationship to Patient    ..................................................               ................................................  Date                                            Time    ..........................................................................................................................................  Reviewed by Signature/Title    ...................................................              ..............................................  Date                                                            Time

## 2017-08-28 ENCOUNTER — HOSPITAL ENCOUNTER (OUTPATIENT)
Dept: CARDIAC REHAB | Facility: CLINIC | Age: 57
End: 2017-08-28
Attending: INTERNAL MEDICINE
Payer: COMMERCIAL

## 2017-08-28 VITALS — WEIGHT: 238.2 LBS | BODY MASS INDEX: 30.58 KG/M2

## 2017-08-28 PROCEDURE — 40000116 ZZH STATISTIC OP CR VISIT: Performed by: REHABILITATION PRACTITIONER

## 2017-08-28 PROCEDURE — 93798 PHYS/QHP OP CAR RHAB W/ECG: CPT | Performed by: REHABILITATION PRACTITIONER

## 2017-08-28 ASSESSMENT — 6 MINUTE WALK TEST (6MWT)
TOTAL DISTANCE WALKED (FT): 2035
GENDER SELECTION: MALE

## 2017-08-28 NOTE — PROGRESS NOTES
Bowen Tipton  57 years old  STEMI/stent 08/28/17 0900   Session   Session 60 Day Individualized Treatment Plan   Certified through this date 10/21/17   Cardiac Rehab Assessment    Physician cosignature/electronic signature indicates approval of this ITP document. I have established, reviewed and made necessary changes to the individualized treatment plan and exercise prescription for this patient.   Cardiac Rehab Assessment Pt presents s/p STEMI with DESx1 to LAD. Pt has been doing well post intervention and has returned to work today. He has been having difficulty sleeping. Encouraged him to discuss the sleep aid he takes with his pharmacist to ensure safety with new heart medications. Pt advised to set up f/u appt with cardiology and with PCP. He was given contact phone numbers. Pt is only willing to commit to 1 maybe 2 days per week of cardiac rehab at this time and has doubts about how he will fit exercise into his lifestyle. He has made heart healthy diet changes and has quit smoking. Pt to start cardiac rehab for initiate of exercise program, education on stress management, nutrition and medications. 8/10/17 Patient is progressing nicely in cardiac rehab with a stable CV response. Patient's biggest limitation is scheduling rehab around his work schedule. Patient is attempting to schedule 1 session at a time and often our rehab schedule is already full with patients. Encouraged patient to schedule a few weeks out to avoid this issue. Patient is scheduled to see NP tomorrow. Patient reports he was cleared by RN to take his sleeping aid medication. Encouraged patient to discuss this at his appointment tomorrow. Patient continues to benefit from skilled therapy to monitor CV response to exercise, to educate on weight loss and exercise principles to help patient achieve goals. 8/28: Pt has attended 7 sessions of CR thus far and is progressing well. He is tolerating a peak MET level of 5.7 and has increased his  duration on the tdm to 40 mins. Pt continues to slowly progress towards his weight loss goal and would like to continue with this goal of losing 1/2-1lb per week with a long term goal of losing 30-40lbs. Pt is motivated to continue to attend CR to progress towards his weight loss goal and continue increasing his strength, endurance and energy levels. Skilled therapy is necessary to continue to educate Pt on the benefits of consistent exercise. Also, to safely progress Pt towards weight loss goal and increasing his MET level and exercise minutes while monitoring ECG changes and hemodynamic response to exercise.    General Information   Treatment Diagnosis STEMI   Date of Treatment Diagnosis 07/22/17   Secondary Treatment Diagnosis Stent   Significant Past CV History None   Comorbidities None   Other Medical History no other pertinent health history   Lead up symptoms chest pain, [facility] Discharge Date 07/23/17   Signs and Symptoms Post Hospital Discharge Sleep;Dizziness;Lightheadedness   Outpatient Cardiac Rehab Start Date 07/26/17   Primary Physician Advised to establish - gave # for [facility]   Primary Physician Follow Up Advised to schedule appointment   Cardiologist Dr. Olivera   Cardiologist Follow Up Scheduled  (10/4/17)   Ejection Fraction 50-55%   Risk Stratification Low   Summary of Cath Report   Summary of Cath Report Available   Date Performed 07/22/17   LAD Stent   Cath Report Comments 7/26 Could not find other vessel results in report.    Living and Work Status    Living Arrangements and Social Status house   Support System Live with an adult   Return to Employment Yes   Occupation Realtor    Preventative Medications   CMS recommended medications Beta Blocker;Ace inhibitors;Lipid Lowering;Antiplatelets   Falls Screen   Have you fallen two or more times in the past year? No   Have you fallen and had an injury in the past year? No    Referral Initiated to Physical Therapy No   Pain   Patient Currently in Pain Denies   Physical Assessments   Incisions WNL   Edema None   Right Lung Sounds not assessed   Left Lung Sounds not assessed   Limitations No limitations   Individualized Treatment Plan   Monitored Sessions Scheduled 12   Monitored Sessions Attended 7   Oxygen   Supplemental Oxygen needed No   Nutrition Management - Weight Management   Assessment Re-assessment   Age 57   Weight 108 kg (238 lb 3.2 oz)   Initial Rate Your Plate Score. Dietary tool to assess eating patterns. Scores range from 24 to 72. The higher the score the healthier the eating pattern. 42   Nutrition Management - Lipids   Lipids Labs Available   Date 07/22/17   Total Cholesterol 183   Triglycerides 235   HDL 34      Prescribed Lipid Medication Yes   Statin Intensity High Intensity   Lipid Comments 7/26 reviewed cholesterol panel with Pt. He verbalized understanding   Nutrition Management - Diabetes   Diabetes No   Nutrition Management Summary   Dietary Recommendations Mediterranean;Low Sodium;Low Cholesterol;Low Fat   Stages of Change for Diet Compliance Action   Interventions Planned Educate on Weight Management Principles;Educate on Benefits of Exercise   Patient Goals Goal #1   Goal #1 Description Pt will lose 1-2# per week.    Goal #1 Progress Towards Goal 8/10/17 Patient has gained 2 pounds while attending rehab despite changing his diet and exercising up to 30 minutes daily. Encouraged patient to increase duration as he feels able and to work on portion control. 8/28: Pt has lost 1lb since the initial visit to CR. He will continue to increase moderate aerobic exercise minutes >150/week to progress towards this goal.    Nutrition Summary Comments 8/10:Pt has already made changes to his diet to be more heart healthy. He is monitoring sodium to be less than 2000mg / day which helps with portion control, decreased saturated/trans fat, and is eating more fruits  and vegeteables. He is not interested in attending classes at this time. 8/28: Pt continues to read labels and reduce sodium and fat intake. Pt denies the need for any further changes to his diet at this point.    Nutrition Target Outcome Weight loss .5-1 lb/week (if BMI > 25)   Psychosocial Management   Psychosocial Assessment Re-assessment   Is there history of clinical depression or increased risk of depression? No previous history   Current Level of Stress per Patient Report Moderate    Current Coping Skills Patient Unable to Identify Personal Coping Skills;Has Positive Support System   Initial Patient Health Questionnaire -9 Score (PHQ-9) for depression. 5-9 Minimal symptoms, 10-14 Minor depression, 15-19 Major depression, moderately severe, > 20 Major depression, severe  10   Initial [facility] Survey score.  Quality of Life:   If total score > 25 review individual areas where patient rated a 4 or 5.  Consider patients current medical condition and what role that plays on the score.   Adjust treatment protocol to improve areas of concern.  Consider the following:  PHQ9 score, DASI, and re-assessment within the next 30 days to assist with developing treatments.  21   Stages of Change Pre-Contemplation   Interventions Planned Patient to verbalize understanding of negative impact of stress to personal health;Patient to verbalize understanding of behavioral assessment results   Psychosocial Comments 7/26 Pt cannot identify coping mechanisms for stress at this time. He says stress is what it is. He is not interested in attending classes at this time. 8/10/17 No change in stress levels and patient remains in pre-contemplation for stage of change. No new coping skills identified. Patient reports he does have a positive support system at home, which is helpful.   Psychosocial Target Outcome Maximize coping skills   Other Core Components - Hypertension   History of or Diagnosis of Hypertension Yes   Currently  taking Anti-Hypertensives Yes;Beta blocker;Nitrates;Ace Inhibitor   Hypertension Comments 8/10/17 BP has been WNLs in rehab   Other Core Components - Tobacco   History of Tobacco Use Yes   Quit Date or Planned Quit Date 07/21/17   Tobacco Use Status Recent (Quit < 6 mo ago)   Tobacco Habit Cigarettes   Tobacco Use per Day (average) 3-5 cigarettes per day   Years of Tobacco Use 25   Stages of Change Action   Tobacco Comments 8/10/17 Patient remains in action phase with no issues of relapse. Patient is very confident he will remain smoke free. 8/28: Pt has not smoked since his heart event and states this has not been an issue to continue.    Other Core Components Summary   Interventions Planned Instruct on importance of limiting alcohol intake;Educate on importance of maintaining low sodium diet   Interventions In Progress or Completed Educated on importance of maintaining low sodium diet;Instructed on the importance of limiting alcohol intake   Other Core Components Comments 7/26 Pt used to drink up to 1L of captain a week, but has not drank since his event.    Activity/Exercise History   Activity/Exercise Assessment Re-assessment   Activity/Exercise Status prior to event? Was Physically Active   Number of Days Currently participating in Moderate Physical Activity? 7   Number of Days Currently performing  Aerobic Exercise (including rehab)? 4-6   Number of Minutes per Session Currently of Aerobic Exercise (average)? 30   Current Stage of Change (Physical Activity) Maintenance   Current Stage of Change (Aerobic Exercise) Action   Activity/Exercise Comments 7/26 Pt has a physically active lifestyle but is not aerobically active. He is unsure if an exercise program will fit into his lifestyle at this time. 8/10/17 Patient is walking daily for up to 30 minutes each session. 8/28: Pt has been attending CR 1-2x/week along with running up and down his stairs at home, every other day,  times.    Activity/Exercise  Target Outcome An Accumulation of 150  Minutes of Aerobic Activity per Week   Exercise Assessment   6 Minute Walk Predicted - Gender Selection Male   Initial 6 Minute Walk Distance (Feet) 2035 ft   Resting HR 55 bpm   Exercise  bpm   Post Exercise HR 69 bpm   Resting /68   Exercise /80   Post Exercise /62   Effort Rating 5   Current MET Level 5.7   MET Level Goal 6-7   ECG Rhythm Sinus rhythm;Sinus bradycardia   Ectopy None   Current Symptoms Denies symptoms   Limitations/Restrictions None   Exercise Prescription   Mode Treadmill;Airdyne   Duration/Time 15-30 min   Frequency 2 days/week   THR (85% of age predicted max HR) 139.4   OMNI Effort Rating (0-10 Scale) 4-6/10   Progression Continuous bouts;Total exercise time of 20-30 minutes;Aerobic exercise to OMNI rating of 6 or below and at or below THR;Progress peak intensity by 1/2 MET per week   Recommended Home Exercise   Type of Exercise Walking;Stairmaster   Frequency (days per week) 2-3   Duration (minutes per session) 15-30 min   Effort Rating Recommended 4-6/10   30 Day Exercise Plan 7/26 Pt will initiate cardiac rehab 2 days per week for aerobic exercise progression and will supplement with 2-3 days a week of walking outside with omni effort rating of 4-6/10. He will initiate weight training after 8/5   Current Home Exercise   Type of Exercise Other (comments)  (Stairs)   Frequency (days per week) 4-6   Duration (minutes per session) 30   Follow-up/On-going Support   Provider follow-up needed on the following No follow-up needed   Learning Assessment   Learner Patient   Primary Language English   Preferred Learning Style Listening;Reading;Demonstration;Pictures/Video   Barriers to Learning No barriers noted   Patient Education   Education recommended Other (see comment)  (He's not interested in education at this time)   Education classes attended Patient Declined/Refused All Education

## 2017-09-06 ENCOUNTER — HOSPITAL ENCOUNTER (OUTPATIENT)
Dept: CARDIAC REHAB | Facility: CLINIC | Age: 57
End: 2017-09-06
Attending: INTERNAL MEDICINE
Payer: COMMERCIAL

## 2017-09-06 PROCEDURE — 40000116 ZZH STATISTIC OP CR VISIT: Performed by: REHABILITATION PRACTITIONER

## 2017-09-06 PROCEDURE — 93798 PHYS/QHP OP CAR RHAB W/ECG: CPT | Performed by: REHABILITATION PRACTITIONER

## 2017-09-11 ENCOUNTER — HOSPITAL ENCOUNTER (OUTPATIENT)
Dept: CARDIAC REHAB | Facility: CLINIC | Age: 57
End: 2017-09-11
Attending: INTERNAL MEDICINE
Payer: COMMERCIAL

## 2017-09-11 PROCEDURE — 40000116 ZZH STATISTIC OP CR VISIT: Performed by: REHABILITATION PRACTITIONER

## 2017-09-11 PROCEDURE — 93798 PHYS/QHP OP CAR RHAB W/ECG: CPT | Performed by: REHABILITATION PRACTITIONER

## 2017-09-13 ENCOUNTER — HOSPITAL ENCOUNTER (OUTPATIENT)
Dept: CARDIAC REHAB | Facility: CLINIC | Age: 57
End: 2017-09-13
Attending: INTERNAL MEDICINE
Payer: COMMERCIAL

## 2017-09-13 PROCEDURE — 93798 PHYS/QHP OP CAR RHAB W/ECG: CPT | Performed by: REHABILITATION PRACTITIONER

## 2017-09-13 PROCEDURE — 40000116 ZZH STATISTIC OP CR VISIT: Performed by: REHABILITATION PRACTITIONER

## 2017-09-18 ENCOUNTER — HOSPITAL ENCOUNTER (OUTPATIENT)
Dept: CARDIAC REHAB | Facility: CLINIC | Age: 57
End: 2017-09-18
Attending: INTERNAL MEDICINE
Payer: COMMERCIAL

## 2017-09-18 PROCEDURE — 93798 PHYS/QHP OP CAR RHAB W/ECG: CPT | Performed by: REHABILITATION PRACTITIONER

## 2017-09-18 PROCEDURE — 40000116 ZZH STATISTIC OP CR VISIT: Performed by: REHABILITATION PRACTITIONER

## 2017-09-22 ENCOUNTER — HOSPITAL ENCOUNTER (OUTPATIENT)
Dept: CARDIAC REHAB | Facility: CLINIC | Age: 57
End: 2017-09-22
Attending: INTERNAL MEDICINE
Payer: COMMERCIAL

## 2017-09-22 PROCEDURE — 40000116 ZZH STATISTIC OP CR VISIT: Performed by: REHABILITATION PRACTITIONER

## 2017-09-22 PROCEDURE — 93798 PHYS/QHP OP CAR RHAB W/ECG: CPT | Performed by: REHABILITATION PRACTITIONER

## 2017-09-25 ENCOUNTER — HOSPITAL ENCOUNTER (OUTPATIENT)
Dept: CARDIAC REHAB | Facility: CLINIC | Age: 57
End: 2017-09-25
Attending: INTERNAL MEDICINE
Payer: COMMERCIAL

## 2017-09-25 VITALS — BODY MASS INDEX: 30.03 KG/M2 | WEIGHT: 234 LBS | HEIGHT: 74 IN

## 2017-09-25 PROCEDURE — 93798 PHYS/QHP OP CAR RHAB W/ECG: CPT | Performed by: REHABILITATION PRACTITIONER

## 2017-09-25 PROCEDURE — 40000116 ZZH STATISTIC OP CR VISIT: Performed by: REHABILITATION PRACTITIONER

## 2017-09-25 ASSESSMENT — 6 MINUTE WALK TEST (6MWT)
MALE CALC: 2118.37
PREDICTED: 2131.29
FEMALE CALC: 1628.29
GENDER SELECTION: MALE
TOTAL DISTANCE WALKED (FT): 2035

## 2017-09-25 NOTE — PROGRESS NOTES
Bowen Tipton  57 year old  STEMI/DESx1  09/25/17 0800   Session   Session 90 Day Individualized Treatment Plan   Certified through this date 10/21/17   Cardiac Rehab Assessment    Physician willamature/electronic signature indicates approval of this ITP document. I have established, reviewed and made necessary changes to the individualized treatment plan and exercise prescription for this patient.     Cardiac Rehab Assessment 8/10/17 Patient is progressing nicely in cardiac rehab with a stable CV response. Patient's biggest limitation is scheduling rehab around his work schedule. Patient is attempting to schedule 1 session at a time and often our rehab schedule is already full with patients. Encouraged patient to schedule a few weeks out to avoid this issue. Patient is scheduled to see NP tomorrow. Patient reports he was cleared by RN to take his sleeping aid medication. Encouraged patient to discuss this at his appointment tomorrow. Patient continues to benefit from skilled therapy to monitor CV response to exercise, to educate on weight loss and exercise principles to help patient achieve goals. 8/28: Pt has attended 7 sessions of CR thus far and is progressing well. He is tolerating a peak MET level of 5.7 and has increased his duration on the tdm to 40 mins. Pt continues to slowly progress towards his weight loss goal and would like to continue with this goal of losing 1/2-1lb per week with a long term goal of losing 30-40lbs. Pt is motivated to continue to attend CR to progress towards his weight loss goal and continue increasing his strength, endurance and energy levels. Skilled therapy is necessary to continue to educate Pt on the benefits of consistent exercise. Also, to safely progress Pt towards weight loss goal and increasing his MET level and exercise minutes while monitoring ECG changes and hemodynamic response to exercise. 9/25/17 Pt is still struggling to get enough sleep at night and we discussed  trying a sleep study but Pt did not like the idea of having to sleep there overnight. Pt has always struggled with getting enough sleep at night due to the  stress of life and was encouraged to try using visualization or other stress management techniques to help calm his mind before bed. Pt has been exercising 2-3x a week for 45 minutes on the TM and has been walking the dogs every day for 10 minutes a day. He is still trying to manage stress at work but has not been practicing any stress management techniques. OPCR needed for continuous montored exercise for safe progression of exercise at home, and education on stress management principles to reduce risk factors for heart disease.    General Information   Treatment Diagnosis STEMI   Date of Treatment Diagnosis 07/22/17   Secondary Treatment Diagnosis Stent   Significant Past CV History None   Comorbidities None   Other Medical History no other pertinent health history   Lead up symptoms chest pain, general [facility] Location [facility] Discharge Date 07/23/17   Signs and Symptoms Post Hospital Discharge Sleep;Dizziness;Lightheadedness   Outpatient Cardiac Rehab Start Date 07/26/17   Primary Physician Advised to establish - gave # for [facility]   Primary Physician Follow Up Advised to schedule appointment   Cardiologist Dr. Olivera   Cardiologist Follow Up Scheduled  (10/4/17)   Ejection Fraction 50-55%   Risk Stratification Low   Summary of Cath Report   Summary of Cath Report Available   Date Performed 07/22/17   LAD Stent   Cath Report Comments 7/26 Could not find other vessel results in report.    Living and Work Status    Living Arrangements and Social Status house   Support System Live with an adult   Return to Employment Yes   Occupation Realtor    Preventative Medications   CMS recommended medications Beta Blocker;Ace inhibitors;Lipid Lowering;Antiplatelets   Falls Screen   Have you fallen two or more times in the  "past year? No   Have you fallen and had an injury in the past year? No   Referral Initiated to Physical Therapy No   Pain   Patient Currently in Pain Denies   Physical Assessments   Incisions WNL   Edema None   Right Lung Sounds not assessed   Left Lung Sounds not assessed   Limitations No limitations   Individualized Treatment Plan   Monitored Sessions Scheduled 12   Monitored Sessions Attended 12   Oxygen   Supplemental Oxygen needed No   Nutrition Management - Weight Management   Assessment Re-assessment   Age 57   Weight 106.1 kg (234 lb)   Height 1.88 m (6' 2.02\")   BMI (Calculated) 30.09   Initial Rate Your Plate Score. Dietary tool to assess eating patterns. Scores range from 24 to 72. The higher the score the healthier the eating pattern. 42   Weight Management Comments 9/25 Pt has lost 5# since the start of rehab and has been reducing the sodium intake as well as carbs during the day. Pt says his daughter has been helping him with his diet and helps him to read food labels.    Nutrition Management - Lipids   Lipids Labs Available   Date 07/22/17   Total Cholesterol 183   Triglycerides 235   HDL 34      Prescribed Lipid Medication Yes   Statin Intensity High Intensity   Lipid Comments 7/26 reviewed cholesterol panel with Pt. He verbalized understanding   Nutrition Management - Diabetes   Diabetes No   Nutrition Management Summary   Dietary Recommendations Mediterranean;Low Sodium;Low Cholesterol;Low Fat   Stages of Change for Diet Compliance Action   Interventions Planned Educate on Weight Management Principles;Educate on Benefits of Exercise   Patient Goals Goal #1   Goal #1 Description Pt will lose 1-2# per week.    Goal #1 Progress Towards Goal 8/10/17 Patient has gained 2 pounds while attending rehab despite changing his diet and exercising up to 30 minutes daily. Encouraged patient to increase duration as he feels able and to work on portion control. 8/28: Pt has lost 1lb since the initial visit " to CR. He will continue to increase moderate aerobic exercise minutes >150/week to progress towards this goal.    Nutrition Summary Comments 8/10:Pt has already made changes to his diet to be more heart healthy. He is monitoring sodium to be less than 2000mg / day which helps with portion control, decreased saturated/trans fat, and is eating more fruits and vegeteables. He is not interested in attending classes at this time. 8/28: Pt continues to read labels and reduce sodium and fat intake. Pt denies the need for any further changes to his diet at this point. 9/25 Pt has lost 5# since the start of rehab and contributes this to reducing carbs and sodium in his diet. Pt has help from his daughter in managing his diet and reading food labels. Pt denied need for additional education at this point.    Nutrition Target Outcome Weight loss .5-1 lb/week (if BMI > 25)   Psychosocial Management   Psychosocial Assessment Re-assessment   Is there history of clinical depression or increased risk of depression? No previous history   Current Level of Stress per Patient Report Moderate    Current Coping Skills Patient Unable to Identify Personal Coping Skills;Has Positive Support System   Initial Patient Health Questionnaire -9 Score (PHQ-9) for depression. 5-9 Minimal symptoms, 10-14 Minor depression, 15-19 Major depression, moderately severe, > 20 Major depression, severe  10   Initial [facility] Survey score.  Quality of Life:   If total score > 25 review individual areas where patient rated a 4 or 5.  Consider patients current medical condition and what role that plays on the score.   Adjust treatment protocol to improve areas of concern.  Consider the following:  PHQ9 score, DASI, and re-assessment within the next 30 days to assist with developing treatments.  21   Stages of Change Pre-Contemplation   Interventions Planned Patient to verbalize understanding of negative impact of stress to personal health;Patient to  verbalize understanding of behavioral assessment results   Psychosocial Comments 7/26 Pt cannot identify coping mechanisms for stress at this time. He says stress is what it is. He is not interested in attending classes at this time. 8/10/17 No change in stress levels and patient remains in pre-contemplation for stage of change. No new coping skills identified. Patient reports he does have a positive support system at home, which is helpful. 9/25 No new change. Pt continues to have issues sleeping due to stress but is not willing to try stress managment techniques to help with this    Psychosocial Target Outcome Maximize coping skills   Other Core Components - Hypertension   History of or Diagnosis of Hypertension Yes   Currently taking Anti-Hypertensives Yes;Beta blocker;Nitrates;Ace Inhibitor   Hypertension Comments 8/10/17 BP has been WNLs in rehab   Other Core Components - Tobacco   History of Tobacco Use Yes   Quit Date or Planned Quit Date 07/21/17   Tobacco Use Status Recent (Quit < 6 mo ago)   Tobacco Habit Cigarettes   Tobacco Use per Day (average) 3-5 cigarettes per day   Years of Tobacco Use 25   Stages of Change Action   Tobacco Comments 8/10/17 Patient remains in action phase with no issues of relapse. Patient is very confident he will remain smoke free. 8/28: Pt has not smoked since his heart event and states this has not been an issue to continue. 9/25 Pt has not smoked since and is confident that he will be able to continue to abstain.    Other Core Components Summary   Interventions Planned Instruct on importance of limiting alcohol intake;Educate on importance of maintaining low sodium diet   Interventions In Progress or Completed Educated on importance of maintaining low sodium diet;Instructed on the importance of limiting alcohol intake   Other Core Components Comments 7/26 Pt used to drink up to 1L of captain a week, but has not drank since his event.    Activity/Exercise History    Activity/Exercise Assessment Re-assessment   Activity/Exercise Status prior to event? Was Physically Active   Number of Days Currently participating in Moderate Physical Activity? 7   Number of Days Currently performing  Aerobic Exercise (including rehab)? 4-6   Number of Minutes per Session Currently of Aerobic Exercise (average)? 15   Current Stage of Change (Physical Activity) Maintenance   Current Stage of Change (Aerobic Exercise) Action   Activity/Exercise Comments 7/26 Pt has a physically active lifestyle but is not aerobically active. He is unsure if an exercise program will fit into his lifestyle at this time. 8/10/17 Patient is walking daily for up to 30 minutes each session. 8/28: Pt has been attending CR 1-2x/week along with running up and down his stairs at home, every other day,  times. 9/25 Pt has been using his TM at home 2-3x a week for 45 minutes and continues to walk the dogs every day and walk up and down the stairs.    Activity/Exercise Target Outcome An Accumulation of 150  Minutes of Aerobic Activity per Week   Exercise Assessment   6 Minute Walk Predicted - Gender Selection Male   6 Minute Walk Predicted (Male) 2118.37   6 Minute Walk Predicted (Female) 1628.29   Initial 6 Minute Walk Distance (Feet) 2035 ft   Resting HR 73 bpm   Exercise  bpm   Post Exercise HR 98 bpm   Resting /64   Exercise /70   Post Exercise /62   Effort Rating 5   Current MET Level 5.7   MET Level Goal 6-7   ECG Rhythm Sinus rhythm;Sinus bradycardia   Ectopy None   Current Symptoms Denies symptoms   Limitations/Restrictions None   Exercise Prescription   Mode Treadmill;Airdyne   Duration/Time 30-45 min   Frequency 2 days/week   THR (85% of age predicted max HR) 139.4   OMNI Effort Rating (0-10 Scale) 4-6/10   Progression Continuous bouts;Total exercise time of 20-30 minutes;Aerobic exercise to OMNI rating of 6 or below and at or below THR;Progress peak intensity by 1/2 MET per week    Recommended Home Exercise   Type of Exercise Walking;Stairmaster   Frequency (days per week) 2-3   Duration (minutes per session) 15-30 min   Effort Rating Recommended 4-6/10   30 Day Exercise Plan 7/26 Pt will initiate cardiac rehab 2 days per week for aerobic exercise progression and will supplement with 2-3 days a week of walking outside with omni effort rating of 4-6/10. He will initiate weight training after 8/5   Current Home Exercise   Type of Exercise Other (comments)  (Stairs)   Frequency (days per week) 4-6   Duration (minutes per session) 30   Follow-up/On-going Support   Provider follow-up needed on the following No follow-up needed   Learning Assessment   Learner Patient   Primary Language English   Preferred Learning Style Listening;Reading;Demonstration;Pictures/Video   Barriers to Learning No barriers noted   Patient Education   Education recommended Other (see comment)  (He's not interested in education at this time)   Education classes attended Patient Declined/Refused All Education

## 2017-09-26 ENCOUNTER — PRE VISIT (OUTPATIENT)
Dept: CARDIOLOGY | Facility: CLINIC | Age: 57
End: 2017-09-26

## 2017-09-26 DIAGNOSIS — I25.10 CORONARY ARTERY DISEASE INVOLVING NATIVE CORONARY ARTERY OF NATIVE HEART WITHOUT ANGINA PECTORIS: ICD-10-CM

## 2017-09-27 ENCOUNTER — HOSPITAL ENCOUNTER (OUTPATIENT)
Dept: CARDIAC REHAB | Facility: CLINIC | Age: 57
End: 2017-09-27
Attending: INTERNAL MEDICINE
Payer: COMMERCIAL

## 2017-09-27 PROCEDURE — 40000116 ZZH STATISTIC OP CR VISIT: Performed by: REHABILITATION PRACTITIONER

## 2017-09-27 PROCEDURE — 93798 PHYS/QHP OP CAR RHAB W/ECG: CPT | Performed by: REHABILITATION PRACTITIONER

## 2017-10-04 ENCOUNTER — OFFICE VISIT (OUTPATIENT)
Dept: CARDIOLOGY | Facility: CLINIC | Age: 57
End: 2017-10-04
Attending: INTERNAL MEDICINE
Payer: COMMERCIAL

## 2017-10-04 VITALS
WEIGHT: 235.9 LBS | DIASTOLIC BLOOD PRESSURE: 71 MMHG | BODY MASS INDEX: 30.27 KG/M2 | SYSTOLIC BLOOD PRESSURE: 120 MMHG | HEIGHT: 74 IN | HEART RATE: 68 BPM

## 2017-10-04 DIAGNOSIS — I21.02 ST ELEVATION MYOCARDIAL INFARCTION INVOLVING LEFT ANTERIOR DESCENDING (LAD) CORONARY ARTERY (H): ICD-10-CM

## 2017-10-04 DIAGNOSIS — E78.2 MIXED HYPERLIPIDEMIA: Primary | ICD-10-CM

## 2017-10-04 DIAGNOSIS — E66.09 CLASS 1 OBESITY DUE TO EXCESS CALORIES WITHOUT SERIOUS COMORBIDITY WITH BODY MASS INDEX (BMI) OF 30.0 TO 30.9 IN ADULT: Chronic | ICD-10-CM

## 2017-10-04 DIAGNOSIS — I25.10 CORONARY ARTERY DISEASE INVOLVING NATIVE CORONARY ARTERY OF NATIVE HEART WITHOUT ANGINA PECTORIS: ICD-10-CM

## 2017-10-04 DIAGNOSIS — E66.811 CLASS 1 OBESITY DUE TO EXCESS CALORIES WITHOUT SERIOUS COMORBIDITY WITH BODY MASS INDEX (BMI) OF 30.0 TO 30.9 IN ADULT: Chronic | ICD-10-CM

## 2017-10-04 PROCEDURE — 99214 OFFICE O/P EST MOD 30 MIN: CPT | Performed by: INTERNAL MEDICINE

## 2017-10-04 NOTE — PROGRESS NOTES
HPI and Plan:   See dictation    Orders Placed This Encounter   Procedures     Basic metabolic panel     Lipid Profile     Follow-Up with Cardiologist     Follow-Up with Cardiac Advanced Practice Provider       No orders of the defined types were placed in this encounter.      There are no discontinued medications.      Encounter Diagnoses   Name Primary?     ST elevation myocardial infarction involving left anterior descending (LAD) coronary artery (H)      Mixed hyperlipidemia Yes     Coronary artery disease involving native coronary artery of native heart without angina pectoris      Class 1 obesity due to excess calories without serious comorbidity with body mass index (BMI) of 30.0 to 30.9 in adult        CURRENT MEDICATIONS:  Current Outpatient Prescriptions   Medication Sig Dispense Refill     aspirin 81 MG EC tablet Take 1 tablet (81 mg) by mouth daily 31 tablet 11     nitroGLYcerin (NITROSTAT) 0.4 MG sublingual tablet For chest pain place 1 tablet under the tongue every 5 minutes for 3 doses. If symptoms persist 5 minutes after 1st dose call 911. 25 tablet 3     atorvastatin (LIPITOR) 80 MG tablet Take 1 tablet (80 mg) by mouth At Bedtime 30 tablet 11     metoprolol (LOPRESSOR) 25 MG tablet Take 1 tablet (25 mg) by mouth 2 times daily 60 tablet 11     ticagrelor (BRILINTA) 90 MG tablet Take 1 tablet (90 mg) by mouth every 12 hours 60 tablet 11     lisinopril (PRINIVIL/ZESTRIL) 5 MG tablet Take 1 tablet (5 mg) by mouth daily 30 tablet 11     Omeprazole Magnesium (PRILOSEC OTC PO) Take 20 mg by mouth daily as needed         ALLERGIES     Allergies   Allergen Reactions     Codeine      reaction       PAST MEDICAL HISTORY:  Past Medical History:   Diagnosis Date     CAD (coronary artery disease), native coronary artery     cardiac cath July 2017: CINDY to LAD     Hyperlipidemia      STEMI (ST elevation myocardial infarction) (H)        PAST SURGICAL HISTORY:  History reviewed. No pertinent surgical  "history.    FAMILY HISTORY:  Family History   Problem Relation Age of Onset     Colon Cancer Mother      C.A.D. Mother      liver     Myocardial Infarction Father      Heart Failure Father        SOCIAL HISTORY:  Social History     Social History     Marital status:      Spouse name: N/A     Number of children: N/A     Years of education: N/A     Social History Main Topics     Smoking status: Former Smoker     Types: Cigarettes     Smokeless tobacco: Never Used     Alcohol use Yes      Comment: social     Drug use: No     Sexual activity: Yes     Partners: Female     Other Topics Concern     Parent/Sibling W/ Cabg, Mi Or Angioplasty Before 65f 55m? No     Social History Narrative       Review of Systems:  Skin:  Negative       Eyes:  Negative      ENT:  Negative      Respiratory:  Negative       Cardiovascular:    Positive for;lightheadedness;dizziness    Gastroenterology: Negative      Genitourinary:  Negative      Musculoskeletal:  Negative      Neurologic:  Negative      Psychiatric:  Negative      Heme/Lymph/Imm:  Positive for allergies    Endocrine:  Negative        Physical Exam:  Vitals: /71  Pulse 68  Ht 1.88 m (6' 2.02\")  Wt 107 kg (235 lb 14.4 oz)  BMI 30.27 kg/m2    Constitutional:  cooperative, alert and oriented, well developed, well nourished, in no acute distress        Skin:  warm and dry to the touch, no apparent skin lesions or masses noted        Head:  normocephalic, no masses or lesions        Eyes:  pupils equal and round, conjunctivae and lids unremarkable, sclera white, no xanthalasma, EOMS intact, no nystagmus        ENT:  no pallor or cyanosis, dentition good        Neck:  carotid pulses are full and equal bilaterally;no carotid bruit        Chest:  normal breath sounds, clear to auscultation, normal A-P diameter, normal symmetry, normal respiratory excursion, no use of accessory muscles          Cardiac: regular rhythm;normal S1 and S2;no murmurs, gallops or rubs detected  "                 Abdomen:           Vascular: pulses full and equal                                        Extremities and Back:  no edema;no spinal abnormalities noted;normal muscle strength and tone              Neurological:  affect appropriate, oriented to time, person and place;no gross motor deficits              CC  Artie Leach MD  [id number] [id number]

## 2017-10-04 NOTE — MR AVS SNAPSHOT
After Visit Summary   10/4/2017    Bowen Tipton    MRN: [id number]           Patient Information     Date Of Birth          1960        Visit Information        Provider Department      10/4/2017 3:45 PM Brandon Hurley MD [facility]        Today's Diagnoses     Mixed hyperlipidemia    -  1    ST elevation myocardial infarction involving left anterior descending (LAD) coronary artery (H)        Coronary artery disease involving native coronary artery of native heart without angina pectoris        Class 1 obesity due to excess calories without serious comorbidity with body mass index (BMI) of 30.0 to 30.9 in adult           Follow-ups after your visit        Additional Services     Follow-Up with Cardiac Advanced Practice Provider           Follow-Up with Cardiologist                 Future tests that were ordered for you today     Open Future Orders        Priority Expected Expires Ordered    Basic metabolic panel Routine 7/1/2018 10/4/2018 10/4/2017    Lipid Profile Routine 7/1/2018 10/4/2018 10/4/2017    Follow-Up with Cardiologist Routine 7/1/2018 10/4/2018 10/4/2017    Follow-Up with Cardiac Advanced Practice Provider Routine 2/1/2018 10/4/2018 10/4/2017            Who to contact     If you have questions or need follow up information about today's clinic visit or your schedule please contact [facility] directly at [phone].  Normal or non-critical lab and imaging results will be communicated to you by MyChart, letter or phone within 4 business days after the clinic has received the results. If you do not hear from us within 7 days, please contact the clinic through MyChart or phone. If you have a critical or abnormal lab result, we will notify you by phone as soon as possible.  Submit refill requests through freshbag or call your pharmacy and they will forward the refill request to us. Please allow  "3 business days for your refill to be completed.          Additional Information About Your Visit        MyChart Information     iSSimplehart lets you send messages to your doctor, view your test results, renew your prescriptions, schedule appointments and more. To sign up, go to [email or web address] . Click on \"Log in\" on the left side of the screen, which will take you to the Welcome page. Then click on \"Sign up Now\" on the right side of the page.     You will be asked to enter the access code listed below, as well as some personal information. Please follow the directions to create your username and password.     Your access code is: [email or web address]  Expires: 10/21/2017  4:19 PM     Your access code will  in 90 days. If you need help or a new code, please call your [facility] clinic or [phone].        Care EveryWhere ID     This is your Care EveryWhere ID. This could be used by other organizations to access your [facility] medical records  [id number]        Your Vitals Were     Pulse Height BMI (Body Mass Index)             68 1.88 m (6' 2.02\") 30.27 kg/m2          Blood Pressure from Last 3 Encounters:   10/04/17 120/71   17 130/86   17 112/74    Weight from Last 3 Encounters:   10/04/17 107 kg (235 lb 14.4 oz)   17 106.1 kg (234 lb)   17 108 kg (238 lb 3.2 oz)              We Performed the Following     Follow-Up with Cardiologist        Primary Care Provider    None Specified       No primary provider on file.        Equal Access to Services     [facility]: Hadii shaji Escobedo, waaxda luqadaha, qaybta kaallalo abraham . So [facility] [phone].    ATENCIÓN: Si habla español, tiene a clay disposición servicios gratuitos de asistencia lingüística. Llame al [phone].    We comply with applicable federal civil rights laws and [place] laws. We do not discriminate on the basis of race, color, national origin, age, disability, sex, " sexual orientation, or gender identity.            Thank you!     Thank you for choosing [facility] [place]  for your care. Our goal is always to provide you with excellent care. Hearing back from our patients is one way we can continue to improve our services. Please take a few minutes to complete the written survey that you may receive in the mail after your visit with us. Thank you!             Your Updated Medication List - Protect others around you: Learn how to safely use, store and throw away your medicines at www.disposemymeds.org.          This list is accurate as of: 10/4/17  4:27 PM.  Always use your most recent med list.                   Brand Name Dispense Instructions for use Diagnosis    aspirin 81 MG EC tablet     31 tablet    Take 1 tablet (81 mg) by mouth daily    ST elevation myocardial infarction involving left anterior descending (LAD) coronary artery (H)       atorvastatin 80 MG tablet    LIPITOR    30 tablet    Take 1 tablet (80 mg) by mouth At Bedtime    ST elevation myocardial infarction involving left anterior descending (LAD) coronary artery (H)       lisinopril 5 MG tablet    PRINIVIL/ZESTRIL    30 tablet    Take 1 tablet (5 mg) by mouth daily    ST elevation myocardial infarction involving left anterior descending (LAD) coronary artery (H)       metoprolol 25 MG tablet    LOPRESSOR    60 tablet    Take 1 tablet (25 mg) by mouth 2 times daily    ST elevation myocardial infarction involving left anterior descending (LAD) coronary artery (H)       nitroGLYcerin 0.4 MG sublingual tablet    NITROSTAT    25 tablet    For chest pain place 1 tablet under the tongue every 5 minutes for 3 doses. If symptoms persist 5 minutes after 1st dose call 911.    ST elevation myocardial infarction involving left anterior descending (LAD) coronary artery (H)       PRILOSEC OTC PO      Take 20 mg by mouth daily as needed        ticagrelor 90 MG tablet    BRILINTA    60 tablet     Take 1 tablet (90 mg) by mouth every 12 hours    ST elevation myocardial infarction involving left anterior descending (LAD) coronary artery (H)

## 2017-10-04 NOTE — LETTER
10/4/2017    Artie Leach MD  [address] [place] [address]    RE: Bowen Tipton       Dear Colleague,    I had the pleasure of seeing Bowen Tipton in the [facility].    Mr. Tipton is a very nice 58-year-old gentleman with past medical history significant for tobacco abuse, ETOH abuse, hypercholesterolemia and a family history of coronary artery disease.  I met him in July of this year when he presented with an acute anterior wall ST segment elevation myocardial infarction.  We stented his left anterior descending artery.  Troponins peaked at 2.5.  Followup echocardiogram was poor quality but appeared to demonstrate an ejection fraction of 50%-55%.      Mr. Tipton quit smoking at the time.  He is still drinking alcohol on a regular basis.  He has tightened up his diet and managed to lose weight, down about 7 pounds from this past August.  He states he did have 1 episode of chest pain for which he went back to the emergency room but otherwise has had no exertional chest, arm, neck, jaw or shoulder discomfort.  He has no dyspnea on exertion, orthopnea or PND, no palpitations, lightheadedness, dizziness, syncope or near-syncope.  The Brilinta is not exceedingly expensive for him.  He notes no side effects or problems with his current medical regimen other than increased bruisability.     Outpatient Encounter Prescriptions as of 10/4/2017   Medication Sig Dispense Refill     aspirin 81 MG EC tablet Take 1 tablet (81 mg) by mouth daily 31 tablet 11     nitroGLYcerin (NITROSTAT) 0.4 MG sublingual tablet For chest pain place 1 tablet under the tongue every 5 minutes for 3 doses. If symptoms persist 5 minutes after 1st dose call 911. 25 tablet 3     atorvastatin (LIPITOR) 80 MG tablet Take 1 tablet (80 mg) by mouth At Bedtime 30 tablet 11     metoprolol (LOPRESSOR) 25 MG tablet Take 1 tablet (25 mg) by mouth 2 times daily 60 tablet 11     ticagrelor (BRILINTA) 90 MG tablet Take 1  tablet (90 mg) by mouth every 12 hours 60 tablet 11     lisinopril (PRINIVIL/ZESTRIL) 5 MG tablet Take 1 tablet (5 mg) by mouth daily 30 tablet 11     Omeprazole Magnesium (PRILOSEC OTC PO) Take 20 mg by mouth daily as needed       No facility-administered encounter medications on file as of 10/4/2017.       ASSESSMENT AND PLAN:  Bowen appears to be doing quite well from a cardiac standpoint without clinical evidence of ischemia, heart failure or significant arrhythmia.      I congratulated him on staying off of cigarettes.  He states it is not going to be a problem.  He was only smoking intermittently at that time anyways.      Blood pressure is very well controlled at 120/71 with a pulse of 68.      As stated, weight is down about 7 pounds, body mass index is 30.3.  He is moving out of the obese category.  I would not consider him obese at this time anyways, as he is just a big vinicius.      Fasting lipid profile was checked in the hospital at 183, HDL is 34, LDL is 102, triglycerides are 235.  We talked about the importance of a low-carbohydrate diet and the fact that alcohol is a simple carbohydrate.  He was supposed to have a fasting lipid profile done yesterday.  He did not have it done.  We will get it rechecked and then address this in more detail.  He currently is on atorvastatin 80.      We talked about the importance of staying on his dual antiplatelet therapy for a whole year.  I have told him if after the new year the price of Brilinta goes up significantly that he should call us and we can change to Plavix.      I have encouraged him to continue to exercise.  He wants to know if he can ramp up his exercise now that he is out about 2 months, and I told him it is okay that he can exercise in a symptom-limited fashion.      I will have him follow up with my BRAEDEN in about 4 months.  I will see him in 9 months, as that will be 1 year out from his infarct, and at that time we will talk about stopping his P2Y12  inhibitor.     Again, thank you for allowing me to participate in the care of your patient.      Sincerely,    Brandon Hurley MD     [facility]

## 2017-10-05 NOTE — PROGRESS NOTES
HISTORY OF PRESENT ILLNESS:  Mr. Tipton is a very nice 58-year-old gentleman with past medical history significant for tobacco abuse, ETOH abuse, hypercholesterolemia and a family history of coronary artery disease.  I met him in July of this year when he presented with an acute anterior wall ST segment elevation myocardial infarction.  We stented his left anterior descending artery.  Troponins peaked at 2.5.  Followup echocardiogram was poor quality but appeared to demonstrate an ejection fraction of 50%-55%.      Mr. Tipton quit smoking at the time.  He is still drinking alcohol on a regular basis.  He has tightened up his diet and managed to lose weight, down about 7 pounds from this past August.  He states he did have 1 episode of chest pain for which he went back to the emergency room but otherwise has had no exertional chest, arm, neck, jaw or shoulder discomfort.  He has no dyspnea on exertion, orthopnea or PND, no palpitations, lightheadedness, dizziness, syncope or near-syncope.  The Brilinta is not exceedingly expensive for him.  He notes no side effects or problems with his current medical regimen other than increased bruisability.      ASSESSMENT AND PLAN:  Bowen appears to be doing quite well from a cardiac standpoint without clinical evidence of ischemia, heart failure or significant arrhythmia.      I congratulated him on staying off of cigarettes.  He states it is not going to be a problem.  He was only smoking intermittently at that time anyways.      Blood pressure is very well controlled at 120/71 with a pulse of 68.      As stated, weight is down about 7 pounds, body mass index is 30.3.  He is moving out of the obese category.  I would not consider him obese at this time anyways, as he is just a big vinicius.      Fasting lipid profile was checked in the hospital at 183, HDL is 34, LDL is 102, triglycerides are 235.  We talked about the importance of a low-carbohydrate diet and the fact that alcohol  is a simple carbohydrate.  He was supposed to have a fasting lipid profile done yesterday.  He did not have it done.  We will get it rechecked and then address this in more detail.  He currently is on atorvastatin 80.      We talked about the importance of staying on his dual antiplatelet therapy for a whole year.  I have told him if after the new year the price of Brilinta goes up significantly that he should call us and we can change to Plavix.      I have encouraged him to continue to exercise.  He wants to know if he can ramp up his exercise now that he is out about 2 months, and I told him it is okay that he can exercise in a symptom-limited fashion.      I will have him follow up with my BRAEDEN in about 4 months.  I will see him in 9 months, as that will be 1 year out from his infarct, and at that time we will talk about stopping his P2Y12 inhibitor.         JOHN PAUL RUSSO MD, [facility]             D: 10/04/2017 16:27   T: 10/04/2017 23:02   MT: DON      Name:     ELEAZAR MITCHELL   MRN:      [id number]        Account:      [id number]   :      1960           Service Date: 10/04/2017      Document: [id number]

## 2017-10-18 ENCOUNTER — HOSPITAL ENCOUNTER (OUTPATIENT)
Dept: CARDIAC REHAB | Facility: CLINIC | Age: 57
End: 2017-10-18
Attending: INTERNAL MEDICINE
Payer: COMMERCIAL

## 2017-10-18 VITALS — WEIGHT: 236 LBS | BODY MASS INDEX: 30.29 KG/M2 | HEIGHT: 74 IN

## 2017-10-18 PROCEDURE — 93798 PHYS/QHP OP CAR RHAB W/ECG: CPT | Performed by: REHABILITATION PRACTITIONER

## 2017-10-18 PROCEDURE — 40000116 ZZH STATISTIC OP CR VISIT: Performed by: REHABILITATION PRACTITIONER

## 2017-10-18 ASSESSMENT — 6 MINUTE WALK TEST (6MWT)
PREDICTED: 2126.01
GENDER SELECTION: MALE
FEMALE CALC: 1621.46
MALE CALC: 2113.12
TOTAL DISTANCE WALKED (FT): 2035

## 2017-10-18 NOTE — PROGRESS NOTES
Bowen Tipton  57 year old  DX: STEMI  STENT 10/18/17 0900   Session   Session Progress Update   Certified through this date 11/18/17   Physician cosignature/electronic signature indicates approval of this ITP document. I have established, reviewed and made necessary changes to the individualized treatment plan and exercise prescription for this patient.   Cardiac Rehab Assessment   Cardiac Rehab Assessment 8/28: Pt has attended 7 sessions of CR thus far and is progressing well. He is tolerating a peak MET level of 5.7 and has increased his duration on the tdm to 40 mins. Pt continues to slowly progress towards his weight loss goal and would like to continue with this goal of losing 1/2-1lb per week with a long term goal of losing 30-40lbs. Pt is motivated to continue to attend CR to progress towards his weight loss goal and continue increasing his strength, endurance and energy levels. Skilled therapy is necessary to continue to educate Pt on the benefits of consistent exercise. Also, to safely progress Pt towards weight loss goal and increasing his MET level and exercise minutes while monitoring ECG changes and hemodynamic response to exercise. 9/25/17 Pt is still struggling with getting enough sleep. Discussed sleep study but Pt did not like the idea of having to sleep there overnight. Pt has always struggled with getting enough sleep at night due to stress of life and was encouraged to try using visualization or other stress management techniques to help calm his mind before bed. Pt has been exercising 2-3x a week for 45 minutes on the TM and has been walking the dogs every day for 10 minutes a day. He is still trying to manage stress at work and has not been practicing any stress management techniques. OPCR needed for continuous montored exercise for safe progression of exercise at home, and education on stress management principles to reduce risk factors for heart disease.   10/18/17  PT denies CV symptms.   He reports not having to see his cardiologist now for 1 year.  He still does not have a primary MD and I encouraged him to get one of those as well.  PT reports purchasing a treadmill for home.  We discussed his typical CV symptoms. PT has not been in rehab for 2 weeks due to his schedule and the fact rehab schedule fillled up.  PT reports his MD suggested that he lower his CHO vs watch NA so closely.  We discussed good CHO vs bad CHO.  PT unable to stay for nutrition class.  We also discussed not skipping meals. PT currently exercising in rehab 1x per week for increased monitoring and progression of MET level to support him as he devleops his program at home.  Discussed his goal of exercise 4 days per week minimum.    General Information   Treatment Diagnosis STEMI   Date of Treatment Diagnosis 07/22/17   Secondary Treatment Diagnosis Stent   Significant Past CV History None   Comorbidities None   Other Medical History no other pertinent health history   Lead up symptoms chest pain, general malaise   Hospital Location [facility]   Hospital Discharge Date 07/23/17   Signs and Symptoms Post Hospital Discharge Sleep;Dizziness;Lightheadedness   Outpatient Cardiac Rehab Start Date 07/26/17   Primary Physician Advised to establish - gave # for [facility]   Primary Physician Follow Up Advised to schedule appointment   Cardiologist Dr. Olivera   Cardiologist Follow Up Scheduled  (10/4/17)   Ejection Fraction 50-55%   Risk Stratification Low   Summary of Cath Report   Summary of Cath Report Available   Date Performed 07/22/17   LAD Stent   Cath Report Comments 7/26 Could not find other vessel results in report.    Living and Work Status    Living Arrangements and Social Status house   Support System Live with an adult   Return to Employment Yes   Occupation Realtor    Preventative Medications   CMS recommended medications Beta Blocker;Ace inhibitors;Lipid Lowering;Antiplatelets   Falls Screen   Have you  "fallen two or more times in the past year? No   Have you fallen and had an injury in the past year? No   Referral Initiated to Physical Therapy No   Pain   Patient Currently in Pain Denies   Physical Assessments   Incisions WNL   Edema None   Right Lung Sounds not assessed   Left Lung Sounds not assessed   Limitations No limitations   Individualized Treatment Plan   Monitored Sessions Scheduled 24   Monitored Sessions Attended 15   Oxygen   Supplemental Oxygen needed No   Nutrition Management - Weight Management   Assessment Re-assessment   Age 57   Weight 107 kg (236 lb)   Height 1.88 m (6' 2.02\")   BMI (Calculated) 30.35   Initial Rate Your Plate Score. Dietary tool to assess eating patterns. Scores range from 24 to 72. The higher the score the healthier the eating pattern. 42   Weight Management Comments 9/25 Pt has lost 5# since the start of rehab and has been reducing the sodium intake as well as carbs during the day. Pt says his daughter has been helping him with his diet and helps him to read food labels.   10/18/17 PT has stopped being so strict on NA and has begun to lower his CHO.  Today we discussed the \"good\" CHO high fiber CHO that he should include in his diet.    Nutrition Management - Lipids   Lipids Labs Available   Date 07/22/17   Total Cholesterol 183   Triglycerides 235   HDL 34      Prescribed Lipid Medication Yes   Statin Intensity High Intensity   Lipid Comments 7/26 reviewed cholesterol panel with Pt. He verbalized understanding  Discussed foods that contribute to these CHOL values.    Nutrition Management - Diabetes   Diabetes No   Nutrition Management Summary   Dietary Recommendations Mediterranean;Low Sodium;Low Cholesterol;Low Fat   Stages of Change for Diet Compliance Action   Interventions Planned Educate on Weight Management Principles;Educate on Benefits of Exercise   Patient Goals Goal #1   Goal #1 Description Pt will lose 1-2# per week.    Goal #1 Target Date 11/18/17   Goal " #1 Progress Towards Goal 8/10/17 Patient has gained 2 pounds while attending rehab despite changing his diet and exercising up to 30 minutes daily. Encouraged patient to increase duration as he feels able and to work on portion control. 8/28: Pt has lost 1lb since the initial visit to CR. He will continue to increase moderate aerobic exercise minutes >150/week to progress towards this goal.   10/18/17  PT is down 3#.  He admits zuleima twhen he is not exercising on a regular basis he see's his wt increase.  Discussed benefits of dietary intake on wt loss.    Nutrition Summary Comments 8/10:Pt has already made changes to his diet to be more heart healthy. He is monitoring sodium to be less than 2000mg / day which helps with portion control, decreased saturated/trans fat, and is eating more fruits and vegeteables. He is not interested in attending classes at this time. 8/28: Pt continues to read labels and reduce sodium and fat intake. Pt denies the need for any further changes to his diet at this point. 9/25 Pt has lost 5# since the start of rehab and contributes this to reducing carbs and sodium in his diet. Pt has help from his daughter in managing his diet and reading food labels. Pt denied need for additional education at this point.    Nutrition Target Outcome Weight loss .5-1 lb/week (if BMI > 25)   Psychosocial Management   Psychosocial Assessment Re-assessment   Is there history of clinical depression or increased risk of depression? No previous history   Current Level of Stress per Patient Report Moderate    Current Coping Skills Patient Unable to Identify Personal Coping Skills;Has Positive Support System   Initial Patient Health Questionnaire -9 Score (PHQ-9) for depression. 5-9 Minimal symptoms, 10-14 Minor depression, 15-19 Major depression, moderately severe, > 20 Major depression, severe  10   Initial [facility] Survey score.  Quality of Life:   If total score > 25 review individual areas where patient  rated a 4 or 5.  Consider patients current medical condition and what role that plays on the score.   Adjust treatment protocol to improve areas of concern.  Consider the following:  PHQ9 score, DASI, and re-assessment within the next 30 days to assist with developing treatments.  21   Stages of Change Pre-Contemplation   Interventions Planned Patient to verbalize understanding of negative impact of stress to personal health;Patient to verbalize understanding of behavioral assessment results   Psychosocial Comments 7/26 Pt cannot identify coping mechanisms for stress at this time. He says stress is what it is. He is not interested in attending classes at this time. 8/10/17 No change in stress levels and patient remains in pre-contemplation for stage of change. No new coping skills identified. Patient reports he does have a positive support system at home, which is helpful. 9/25 No new change. Pt continues to have issues sleeping due to stress but is not willing to try stress managment techniques to help with this   10/18/17  Plan to redo the PHQ9 next session.  PT admits to moderate stress due to his work schedule.  He had to leave rehab early due to work. PT continues to not be interested in learning more about stres smangement.    Psychosocial Target Outcome Maximize coping skills   Other Core Components - Hypertension   History of or Diagnosis of Hypertension Yes   Currently taking Anti-Hypertensives Yes;Beta blocker;Nitrates;Ace Inhibitor   Hypertension Comments 8/10/17 BP has been WNLs in rehab   Other Core Components - Tobacco   History of Tobacco Use Yes   Quit Date or Planned Quit Date 07/21/17   Tobacco Use Status Recent (Quit < 6 mo ago)   Tobacco Habit Cigarettes   Tobacco Use per Day (average) 3-5 cigarettes per day   Years of Tobacco Use 25   Stages of Change Action   Tobacco Comments 8/10/17 Patient remains in action phase with no issues of relapse. Patient is very confident he will remain smoke free.  8/28: Pt has not smoked since his heart event and states this has not been an issue to continue. 9/25 Pt has not smoked since and is confident that he will be able to continue to abstain.  10/18/17  PT continues to be tobacco free.  He did not find this a challenge to quite smoking as he often did not have a cigarette throughout the day.    Other Core Components Summary   Interventions Planned Instruct on importance of limiting alcohol intake;Educate on importance of maintaining low sodium diet   Interventions In Progress or Completed Educated on importance of maintaining low sodium diet;Instructed on the importance of limiting alcohol intake   Other Core Components Comments 7/26 Pt used to drink up to 1L of captain a week, but has not drank since his event.    Activity/Exercise History   Activity/Exercise Assessment Re-assessment   Activity/Exercise Status prior to event? Was Physically Active   Number of Days Currently participating in Moderate Physical Activity? 7   Number of Days Currently performing  Aerobic Exercise (including rehab)? 4-6   Number of Minutes per Session Currently of Aerobic Exercise (average)? 15   Current Stage of Change (Physical Activity) Maintenance   Current Stage of Change (Aerobic Exercise) Action   Activity/Exercise Comments 7/26 Pt has a physically active lifestyle but is not aerobically active. He is unsure if an exercise program will fit into his lifestyle at this time. 8/10/17 Patient is walking daily for up to 30 minutes each session. 8/28: Pt has been attending CR 1-2x/week along with running up and down his stairs at home, every other day,  times. 9/25 Pt has been using his TM at home 2-3x a week for 45 minutes and continues to walk the dogs every day and walk up and down the stairs. 10/18/17  PT has been sporadically exercising rehab assists him to keep on track.    Activity/Exercise Target Outcome An Accumulation of 150  Minutes of Aerobic Activity per Week   Exercise  Assessment   6 Minute Walk Predicted - Gender Selection Male   6 Minute Walk Predicted (Male) 2113.12   6 Minute Walk Predicted (Female) 1621.46   Initial 6 Minute Walk Distance (Feet) 2035 ft   Resting HR 55 bpm   Exercise  bpm   Post Exercise HR 65 bpm   Resting /60   Exercise /80   Post Exercise BP 98/66   Effort Rating 5   Current MET Level 6.2   MET Level Goal 6-7   ECG Rhythm Sinus rhythm;Sinus bradycardia   Ectopy None   Current Symptoms Denies symptoms   Limitations/Restrictions None   Exercise Prescription   Mode Treadmill;Airdyne   Duration/Time 30-45 min   Frequency 1 day/week   THR (85% of age predicted max HR) 139.4   OMNI Effort Rating (0-10 Scale) 4-6/10   Progression Continuous bouts;Total exercise time of 20-30 minutes;Aerobic exercise to OMNI rating of 6 or below and at or below THR;Progress peak intensity by 1/2 MET per week   Recommended Home Exercise   Type of Exercise Walking;Treadmill;Stairmaster   Frequency (days per week) 2-3   Duration (minutes per session) 30-45 min   Effort Rating Recommended 4-6/10   30 Day Exercise Plan 7/26 Pt will initiate cardiac rehab 2 days per week for aerobic exercise progression and will supplement with 2-3 days a week of walking outside with omni effort rating of 4-6/10. He will initiate weight training after 8/5  10/18/17  PT to exercie 4 days per week including 1 day in rehab.    Current Home Exercise   Type of Exercise Other (comments)  (Stairs)   Frequency (days per week) 4-6   Duration (minutes per session) 30   Follow-up/On-going Support   Provider follow-up needed on the following No follow-up needed   Learning Assessment   Learner Patient   Primary Language English   Preferred Learning Style Listening;Reading;Demonstration;Pictures/Video   Barriers to Learning No barriers noted   Patient Education   Education recommended Other (see comment)  (He's not interested in education at this time)   Education classes attended Patient  Declined/Refused All Education

## 2017-10-25 ENCOUNTER — HOSPITAL ENCOUNTER (OUTPATIENT)
Dept: CARDIAC REHAB | Facility: CLINIC | Age: 57
End: 2017-10-25
Attending: INTERNAL MEDICINE
Payer: COMMERCIAL

## 2017-10-25 DIAGNOSIS — I21.02 ST ELEVATION MYOCARDIAL INFARCTION INVOLVING LEFT ANTERIOR DESCENDING (LAD) CORONARY ARTERY (H): ICD-10-CM

## 2017-10-25 PROCEDURE — 40000116 ZZH STATISTIC OP CR VISIT: Performed by: REHABILITATION PRACTITIONER

## 2017-10-25 PROCEDURE — 93798 PHYS/QHP OP CAR RHAB W/ECG: CPT | Performed by: REHABILITATION PRACTITIONER

## 2017-10-25 NOTE — TELEPHONE ENCOUNTER
Per LOV w/Dr. Hurley, continue Brilinta until 7/2018 f/u with him. Rx escripted to [facility] for 180# w/2 refills.

## 2017-10-26 ENCOUNTER — HOSPITAL ENCOUNTER (OUTPATIENT)
Dept: CARDIAC REHAB | Facility: CLINIC | Age: 57
End: 2017-10-26
Attending: INTERNAL MEDICINE
Payer: COMMERCIAL

## 2017-10-26 PROCEDURE — 93798 PHYS/QHP OP CAR RHAB W/ECG: CPT | Performed by: OCCUPATIONAL THERAPIST

## 2017-10-26 PROCEDURE — 40000116 ZZH STATISTIC OP CR VISIT: Performed by: OCCUPATIONAL THERAPIST

## 2017-11-03 ENCOUNTER — HOSPITAL ENCOUNTER (OUTPATIENT)
Dept: CARDIAC REHAB | Facility: CLINIC | Age: 57
End: 2017-11-03
Attending: INTERNAL MEDICINE
Payer: COMMERCIAL

## 2017-11-03 PROCEDURE — 40000116 ZZH STATISTIC OP CR VISIT: Performed by: REHABILITATION PRACTITIONER

## 2017-11-03 PROCEDURE — 93798 PHYS/QHP OP CAR RHAB W/ECG: CPT | Performed by: REHABILITATION PRACTITIONER

## 2017-11-07 ENCOUNTER — HOSPITAL ENCOUNTER (OUTPATIENT)
Dept: CARDIAC REHAB | Facility: CLINIC | Age: 57
End: 2017-11-07
Attending: INTERNAL MEDICINE
Payer: COMMERCIAL

## 2017-11-07 VITALS — BODY MASS INDEX: 30.01 KG/M2 | HEIGHT: 74 IN | WEIGHT: 233.8 LBS

## 2017-11-07 PROCEDURE — 40000116 ZZH STATISTIC OP CR VISIT: Performed by: REHABILITATION PRACTITIONER

## 2017-11-07 PROCEDURE — 93798 PHYS/QHP OP CAR RHAB W/ECG: CPT | Performed by: REHABILITATION PRACTITIONER

## 2017-11-07 ASSESSMENT — 6 MINUTE WALK TEST (6MWT)
PREDICTED: 2131.82
TOTAL DISTANCE WALKED (FT): 2035
GENDER SELECTION: MALE
TOTAL DISTANCE WALKED (FT): 2105
FEMALE CALC: 1628.97
MALE CALC: 2118.9

## 2017-11-07 NOTE — PROGRESS NOTES
Bowen Tipton  57 year old  DX: STEMI stent 11/07/17 0900   Physician cosignature/electronic signature indicates agreements with the ITP document and approval of discharge.   Session Discharge Note   Certified through this date 11/18/17   Cardiac Rehab Assessment   Cardiac Rehab Assessment 8/28: Pt has attended 7 sessions of CR thus far and is progressing well. He is tolerating a peak MET level of 5.7 and has increased his duration on the tdm to 40 mins. Pt continues to slowly progress towards his weight loss goal and would like to continue with this goal of losing 1/2-1lb per week with a long term goal of losing 30-40lbs. Pt is motivated to continue to attend CR to progress towards his weight loss goal and continue increasing his strength, endurance and energy levels. Skilled therapy is necessary to continue to educate Pt on the benefits of consistent exercise. Also, to safely progress Pt towards weight loss goal and increasing his MET level and exercise minutes while monitoring ECG changes and hemodynamic response to exercise. 9/25/17 Pt is still struggling with getting enough sleep. Discussed sleep study but Pt did not like the idea of having to sleep there overnight. Pt has always struggled with getting enough sleep at night due to stress of life and was encouraged to try using visualization or other stress management techniques to help calm his mind before bed. Pt has been exercising 2-3x a week for 45 minutes on the TM and has been walking the dogs every day for 10 minutes a day. He is still trying to manage stress at work and has not been practicing any stress management techniques. OPCR needed for continuous montored exercise for safe progression of exercise at home, and education on stress management principles to reduce risk factors for heart disease.   10/18/17  PT denies CV symptms.  He reports not having to see his cardiologist now for 1 year.  He still does not have a primary MD and I encouraged him  to get one of those as well.  PT reports purchasing a treadmill for home.  We discussed his typical CV symptoms. PT has not been in rehab for 2 weeks due to his schedule and the fact rehab schedule fillled up.  PT reports his MD suggested that he lower his CHO vs watch NA so closely.  We discussed good CHO vs bad CHO.  PT unable to stay for nutrition class.  We also discussed not skipping meals. PT currently exercising in rehab 1x per week for increased monitoring and progression of MET level to support him as he devleops his program at home.  Discussed his goal of exercise 4 days per week minimum.  11/17/17.  Discharge today. PT did well in rehab.  He struggles with stress but remains ins precontemplation for making changes.  He has made several changes to his diet, lost some weight and stopped smoking. Overall he is feeling well. He plans to exercise on home treadmill.  Provided  Verbal and written instruction for frequency, intensity and duration as well as strength training and stretching.   General Information   Treatment Diagnosis STEMI   Date of Treatment Diagnosis 07/22/17   Secondary Treatment Diagnosis Stent   Significant Past CV History None   Comorbidities None   Other Medical History no other pertinent health history   Lead up symptoms chest pain, general malaise   Hospital Location [facility] Discharge Date 07/23/17   Signs and Symptoms Post Hospital Discharge Sleep;Dizziness;Lightheadedness   Outpatient Cardiac Rehab Start Date 07/26/17   Primary Physician Advised to establish - gave # for [facility]   Primary Physician Follow Up Advised to schedule appointment   Cardiologist Dr. Olivera   Cardiologist Follow Up Scheduled  (10/4/17)   Ejection Fraction 50-55%   Risk Stratification Low   Summary of Cath Report   Summary of Cath Report Available   Date Performed 07/22/17   LAD Stent   Cath Report Comments 7/26 Could not find other vessel results in report.    Living and Work  "Status    Living Arrangements and Social Status house   Support System Live with an adult   Return to Employment Yes   Occupation Realtor    Preventative Medications   CMS recommended medications Beta Blocker;Ace inhibitors;Lipid Lowering;Antiplatelets   Falls Screen   Have you fallen two or more times in the past year? No   Have you fallen and had an injury in the past year? No   Referral Initiated to Physical Therapy No   Pain   Patient Currently in Pain Denies   Physical Assessments   Incisions WNL   Edema None   Right Lung Sounds not assessed   Left Lung Sounds not assessed   Limitations No limitations   Individualized Treatment Plan   Monitored Sessions Scheduled 24   Monitored Sessions Attended 18   Oxygen   Supplemental Oxygen needed No   Nutrition Management - Weight Management   Assessment Discharge   Age 57   Weight 106.1 kg (233 lb 12.8 oz)   Height 1.88 m (6' 2.02\")   BMI (Calculated) 30.07   Initial Rate Your Plate Score. Dietary tool to assess eating patterns. Scores range from 24 to 72. The higher the score the healthier the eating pattern. 42   Discharge Rate Your Plate Score 58   Weight Management Comments 9/25 Pt has lost 5# since the start of rehab and has been reducing the sodium intake as well as carbs during the day. Pt says his daughter has been helping him with his diet and helps him to read food labels.   10/18/17 PT has stopped being so strict on NA and has begun to lower his CHO.  Today we discussed the \"good\" CHO high fiber CHO that he should include in his diet.     Nutrition Management - Lipids   Lipids Labs Available   Date 07/22/17   Total Cholesterol 183   Triglycerides 235   HDL 34      Prescribed Lipid Medication Yes   Statin Intensity High Intensity   Lipid Comments 7/26 reviewed cholesterol panel with Pt. He verbalized understanding  Discussed foods that contribute to these CHOL values.    Nutrition Management - Diabetes   Diabetes No   Nutrition Management Summary "   Dietary Recommendations Mediterranean;Low Sodium;Low Cholesterol;Low Fat   Stages of Change for Diet Compliance Action   Interventions Planned Educate on Weight Management Principles;Educate on Benefits of Exercise   Interventions In Progress or Completed Understands how to accurately read Food Labels;Understands Weight Management Principles;Educated on Benefits of Exercise   Patient Goals Goal #1   Goal #1 Description Pt will lose 1-2# per week.    Goal #1 Target Date 11/18/17   Goal #1 Date Met 11/17/17   Goal #1 Progress Towards Goal 8/10/17 Patient has gained 2 pounds while attending rehab despite changing his diet and exercising up to 30 minutes daily. Encouraged patient to increase duration as he feels able and to work on portion control. 8/28: Pt has lost 1lb since the initial visit to CR. He will continue to increase moderate aerobic exercise minutes >150/week to progress towards this goal.   10/18/17  PT is down 3#.  He admits zuleima twhen he is not exercising on a regular basis he see's his wt increase.  Discussed benefits of dietary intake on wt loss.  11/07/17 PT lost 6# while attending rehab. He understands what he needs to do.  Provided education on healthy CHO and lean proteins.    Nutrition Summary Comments 8/10:Pt has already made changes to his diet to be more heart healthy. He is monitoring sodium to be less than 2000mg / day which helps with portion control, decreased saturated/trans fat, and is eating more fruits and vegeteables. He is not interested in attending classes at this time. 8/28: Pt continues to read labels and reduce sodium and fat intake. Pt denies the need for any further changes to his diet at this point. 9/25 Pt has lost 5# since the start of rehab and contributes this to reducing carbs and sodium in his diet. Pt has help from his daughter in managing his diet and reading food labels. Pt denied need for additional education at this point.    Nutrition Target Outcome Weight loss  .5-1 lb/week (if BMI > 25)   Psychosocial Management   Psychosocial Assessment Discharge   Is there history of clinical depression or increased risk of depression? No previous history   Current Level of Stress per Patient Report Moderate    Current Coping Skills Patient Unable to Identify Personal Coping Skills;Has Positive Support System   Initial Patient Health Questionnaire -9 Score (PHQ-9) for depression. 5-9 Minimal symptoms, 10-14 Minor depression, 15-19 Major depression, moderately severe, > 20 Major depression, severe  10   Discharge PHQ-9 Score for Depression 7   [facility] score.  Quality of Life:   If total score > 25 review individual areas where patient rated a 4 or 5.  Consider patients current medical condition and what role that plays on the score.   Adjust treatment protocol to improve areas of concern.  Consider the following:  PHQ9 score, DASI, and re-assessment within the next 30 days to assist with developing treatments.  21   Discharge [facility] COOP Survey Score 17   Stages of Change Pre-Contemplation   Interventions Planned Patient to verbalize understanding of negative impact of stress to personal health;Patient to verbalize understanding of behavioral assessment results   Interventions In Progress or Completed Patient verbalizes understanding of behavioral assessment scores;Patient verbalizes understanding of negative impact of stress to personal health   Psychosocial Comments 7/26 Pt cannot identify coping mechanisms for stress at this time. He says stress is what it is. He is not interested in attending classes at this time. 8/10/17 No change in stress levels and patient remains in pre-contemplation for stage of change. No new coping skills identified. Patient reports he does have a positive support system at home, which is helpful. 9/25 No new change. Pt continues to have issues sleeping due to stress but is not willing to try stress managment techniques to help with  this   10/18/17  Plan to redo the PHQ9 next session.  PT admits to moderate stress due to his work schedule.  He had to leave rehab early due to work. PT continues to not be interested in learning more about stres smangement.  11/17/17.  Remains in precontemplation for change.  Explained that the stress is just part of his job.  Talked about deep breathing, yoga and exercise as some options for stress relief.   Psychosocial Target Outcome Maximize coping skills   Other Core Components - Hypertension   History of or Diagnosis of Hypertension Yes   Currently taking Anti-Hypertensives Yes;Beta blocker;Nitrates;Ace Inhibitor   Hypertension Comments 8/10/17 BP has been WNLs in rehab   Other Core Components - Tobacco   History of Tobacco Use Yes   Quit Date or Planned Quit Date 07/21/17   Tobacco Use Status Recent (Quit < 6 mo ago)   Tobacco Habit Cigarettes   Tobacco Use per Day (average) 3-5 cigarettes per day   Years of Tobacco Use 25   Stages of Change Action   Tobacco Comments 8/10/17 Patient remains in action phase with no issues of relapse. Patient is very confident he will remain smoke free. 8/28: Pt has not smoked since his heart event and states this has not been an issue to continue. 9/25 Pt has not smoked since and is confident that he will be able to continue to abstain.  10/18/17  PT continues to be tobacco free.  He did not find this a challenge to quite smoking as he often did not have a cigarette throughout the day.    Other Core Components Summary   Interventions Planned Instruct on importance of limiting alcohol intake;Educate on importance of maintaining low sodium diet   Interventions In Progress or Completed Educated on importance of maintaining low sodium diet;Instructed on the importance of limiting alcohol intake   Other Core Components Comments 7/26 Pt used to drink up to 1L of captain a week, but has not drank since his event.    Activity/Exercise History   Activity/Exercise Assessment Discharge    Activity/Exercise Status prior to event? Was Physically Active   Number of Days Currently participating in Moderate Physical Activity? 7   Number of Days Currently performing  Aerobic Exercise (including rehab)? 4-6   Number of Minutes per Session Currently of Aerobic Exercise (average)? 15   Current Stage of Change (Physical Activity) Maintenance   Current Stage of Change (Aerobic Exercise) Action   Activity/Exercise Comments 7/26 Pt has a physically active lifestyle but is not aerobically active. He is unsure if an exercise program will fit into his lifestyle at this time. 8/10/17 Patient is walking daily for up to 30 minutes each session. 8/28: Pt has been attending CR 1-2x/week along with running up and down his stairs at home, every other day,  times. 9/25 Pt has been using his TM at home 2-3x a week for 45 minutes and continues to walk the dogs every day and walk up and down the stairs. 10/18/17  PT has been sporadically exercising rehab assists him to keep on track.  11/7/17.  PLans to use home treadmill   Activity/Exercise Target Outcome An Accumulation of 150  Minutes of Aerobic Activity per Week   Exercise Assessment   6 Minute Walk Predicted - Gender Selection Male   6 Minute Walk Predicted (Male) 2118.9   6 Minute Walk Predicted (Female) 1628.97   Initial 6 Minute Walk Distance (Feet) 2035 ft   Discharge 6 Minute Walk Distance (Feet) 2105   Resting HR 57 bpm   Exercise  bpm   Post Exercise HR 76 bpm   Resting BP 90/60   Exercise /70   Post Exercise /66   Effort Rating 5   Current MET Level 8.0   MET Level Goal 6-7   ECG Rhythm Sinus rhythm;Sinus bradycardia   Ectopy None   Current Symptoms Denies symptoms   Limitations/Restrictions None   Exercise Prescription   Mode Treadmill;Airdyne   Duration/Time 30-45 min   Frequency 1 day/week   THR (85% of age predicted max HR) 139.4   OMNI Effort Rating (0-10 Scale) 4-6/10   Progression Continuous bouts;Total exercise time of 20-30  minutes;Aerobic exercise to OMNI rating of 6 or below and at or below THR;Progress peak intensity by 1/2 MET per week   Recommended Home Exercise   Type of Exercise Walking;Treadmill;Stairmaster;Health club;Weights   Frequency (days per week) 4-6   Duration (minutes per session) 30-45 min   Effort Rating Recommended 4-6/10   Current Home Exercise   Type of Exercise Other (comments);Treadmill  (Stairs)   Frequency (days per week) 2-3   Duration (minutes per session) 30-45   Follow-up/On-going Support   Provider follow-up needed on the following No follow-up needed   Learning Assessment   Learner Patient   Primary Language English   Preferred Learning Style Listening;Reading;Demonstration;Pictures/Video   Barriers to Learning No barriers noted   Patient Education   Education recommended Other (see comment)  (He's not interested in education at this time)   Education classes attended Patient Declined/Refused All Education

## 2018-07-31 DIAGNOSIS — I10 BENIGN ESSENTIAL HYPERTENSION: Primary | ICD-10-CM

## 2018-07-31 DIAGNOSIS — I21.02 ST ELEVATION MYOCARDIAL INFARCTION INVOLVING LEFT ANTERIOR DESCENDING (LAD) CORONARY ARTERY (H): ICD-10-CM

## 2018-07-31 DIAGNOSIS — I25.10 CORONARY ARTERY DISEASE INVOLVING NATIVE CORONARY ARTERY OF NATIVE HEART WITHOUT ANGINA PECTORIS: ICD-10-CM

## 2018-07-31 DIAGNOSIS — E78.2 MIXED HYPERLIPIDEMIA: ICD-10-CM

## 2018-07-31 RX ORDER — METOPROLOL TARTRATE 25 MG/1
25 TABLET, FILM COATED ORAL 2 TIMES DAILY
Qty: 60 TABLET | Refills: 3 | Status: SHIPPED | OUTPATIENT
Start: 2018-07-31 | End: 2018-10-12

## 2018-07-31 RX ORDER — LISINOPRIL 5 MG/1
5 TABLET ORAL DAILY
Qty: 30 TABLET | Refills: 3 | Status: SHIPPED | OUTPATIENT
Start: 2018-07-31 | End: 2018-10-12

## 2018-07-31 RX ORDER — ATORVASTATIN CALCIUM 80 MG/1
80 TABLET, FILM COATED ORAL AT BEDTIME
Qty: 30 TABLET | Refills: 3 | Status: SHIPPED | OUTPATIENT
Start: 2018-07-31 | End: 2018-10-12

## 2018-07-31 NOTE — TELEPHONE ENCOUNTER
Patient called requesting refill in his cardiac medications.  Refills e scribed.  Patient due for annual OV Oct 2018. Transferred to scheduling for appointment.

## 2018-09-05 DIAGNOSIS — I21.02 ST ELEVATION MYOCARDIAL INFARCTION INVOLVING LEFT ANTERIOR DESCENDING (LAD) CORONARY ARTERY (H): ICD-10-CM

## 2018-10-08 ENCOUNTER — PRE VISIT (OUTPATIENT)
Dept: CARDIOLOGY | Facility: CLINIC | Age: 58
End: 2018-10-08

## 2018-10-12 ENCOUNTER — OFFICE VISIT (OUTPATIENT)
Dept: CARDIOLOGY | Facility: CLINIC | Age: 58
End: 2018-10-12
Payer: COMMERCIAL

## 2018-10-12 VITALS
DIASTOLIC BLOOD PRESSURE: 68 MMHG | HEART RATE: 58 BPM | BODY MASS INDEX: 33.31 KG/M2 | HEIGHT: 72 IN | SYSTOLIC BLOOD PRESSURE: 100 MMHG | WEIGHT: 245.9 LBS

## 2018-10-12 DIAGNOSIS — I21.02 ST ELEVATION MYOCARDIAL INFARCTION INVOLVING LEFT ANTERIOR DESCENDING (LAD) CORONARY ARTERY (H): ICD-10-CM

## 2018-10-12 DIAGNOSIS — R73.01 IMPAIRED FASTING GLUCOSE: ICD-10-CM

## 2018-10-12 DIAGNOSIS — E78.2 MIXED HYPERLIPIDEMIA: ICD-10-CM

## 2018-10-12 DIAGNOSIS — I10 BENIGN ESSENTIAL HYPERTENSION: ICD-10-CM

## 2018-10-12 DIAGNOSIS — E78.6 HDL DEFICIENCY: Chronic | ICD-10-CM

## 2018-10-12 DIAGNOSIS — E78.2 MIXED HYPERLIPIDEMIA: Primary | ICD-10-CM

## 2018-10-12 DIAGNOSIS — I25.10 CORONARY ARTERY DISEASE INVOLVING NATIVE CORONARY ARTERY OF NATIVE HEART WITHOUT ANGINA PECTORIS: ICD-10-CM

## 2018-10-12 LAB
ALT SERPL W P-5'-P-CCNC: 26 U/L (ref 5–30)
ANION GAP SERPL CALCULATED.3IONS-SCNC: 12.4 MMOL/L (ref 6–17)
BUN SERPL-MCNC: 20 MG/DL (ref 7–30)
CALCIUM SERPL-MCNC: 9.3 MG/DL (ref 8.5–10.5)
CHLORIDE SERPL-SCNC: 101 MMOL/L (ref 98–107)
CHOLEST SERPL-MCNC: 155 MG/DL
CO2 SERPL-SCNC: 28 MMOL/L (ref 23–29)
CREAT SERPL-MCNC: 1.15 MG/DL (ref 0.7–1.3)
GFR SERPL CREATININE-BSD FRML MDRD: 66 ML/MIN/1.7M2
GLUCOSE SERPL-MCNC: 117 MG/DL (ref 70–105)
HDLC SERPL-MCNC: 36 MG/DL
LDLC SERPL CALC-MCNC: 93 MG/DL
NONHDLC SERPL-MCNC: 119 MG/DL
POTASSIUM SERPL-SCNC: 4.4 MMOL/L (ref 3.5–5.1)
SODIUM SERPL-SCNC: 137 MMOL/L (ref 136–145)
TRIGL SERPL-MCNC: 128 MG/DL

## 2018-10-12 PROCEDURE — 84460 ALANINE AMINO (ALT) (SGPT): CPT | Performed by: INTERNAL MEDICINE

## 2018-10-12 PROCEDURE — 99214 OFFICE O/P EST MOD 30 MIN: CPT | Performed by: INTERNAL MEDICINE

## 2018-10-12 PROCEDURE — 36415 COLL VENOUS BLD VENIPUNCTURE: CPT | Performed by: INTERNAL MEDICINE

## 2018-10-12 PROCEDURE — 80048 BASIC METABOLIC PNL TOTAL CA: CPT | Performed by: INTERNAL MEDICINE

## 2018-10-12 PROCEDURE — 80061 LIPID PANEL: CPT | Performed by: INTERNAL MEDICINE

## 2018-10-12 RX ORDER — ROSUVASTATIN CALCIUM 40 MG/1
40 TABLET, COATED ORAL DAILY
Qty: 90 TABLET | Refills: 3 | Status: SHIPPED | OUTPATIENT
Start: 2018-10-12 | End: 2021-02-25

## 2018-10-12 RX ORDER — METOPROLOL SUCCINATE 25 MG/1
25 TABLET, EXTENDED RELEASE ORAL DAILY
Qty: 90 TABLET | Refills: 3 | Status: SHIPPED | OUTPATIENT
Start: 2018-10-12 | End: 2020-03-12

## 2018-10-12 RX ORDER — LISINOPRIL 5 MG/1
5 TABLET ORAL DAILY
Qty: 90 TABLET | Refills: 3 | Status: SHIPPED | OUTPATIENT
Start: 2018-10-12 | End: 2020-03-12

## 2018-10-12 RX ORDER — CLOPIDOGREL BISULFATE 75 MG/1
75 TABLET ORAL DAILY
Qty: 90 TABLET | Refills: 3 | Status: SHIPPED | OUTPATIENT
Start: 2018-10-12 | End: 2021-05-27

## 2018-10-12 NOTE — LETTER
10/12/2018    Physician No Ref-Primary  No address on file    RE: Bowen Tipton       Dear Colleague,    I had the pleasure of seeing Bowen Tipton in the [facility].    HPI and Plan:   See dictation    Orders Placed This Encounter   Procedures     Lipid Profile     ALT     Hemoglobin A1c     Basic metabolic panel     Lipid Profile     ALT     Follow-Up with Cardiac Advanced Practice Provider     Follow-Up with Cardiac Advanced Practice Provider     Follow-Up with Cardiologist       Orders Placed This Encounter   Medications     rosuvastatin (CRESTOR) 40 MG tablet     Sig: Take 1 tablet (40 mg) by mouth daily     Dispense:  90 tablet     Refill:  3     lisinopril (PRINIVIL/ZESTRIL) 5 MG tablet     Sig: Take 1 tablet (5 mg) by mouth daily     Dispense:  90 tablet     Refill:  3     metoprolol succinate (TOPROL-XL) 25 MG 24 hr tablet     Sig: Take 1 tablet (25 mg) by mouth daily     Dispense:  90 tablet     Refill:  3     clopidogrel (PLAVIX) 75 MG tablet     Sig: Take 1 tablet (75 mg) by mouth daily     Dispense:  90 tablet     Refill:  3     aspirin 81 MG EC tablet     Sig: Take 1 tablet (81 mg) by mouth daily     Dispense:  100 tablet     Refill:  3       Medications Discontinued During This Encounter   Medication Reason     atorvastatin (LIPITOR) 80 MG tablet      metoprolol tartrate (LOPRESSOR) 25 MG tablet      ticagrelor (BRILINTA) 90 MG tablet      lisinopril (PRINIVIL/ZESTRIL) 5 MG tablet Reorder     aspirin 81 MG EC tablet Reorder         Encounter Diagnoses   Name Primary?     Coronary artery disease involving native coronary artery of native heart without angina pectoris      Benign essential hypertension      ST elevation myocardial infarction involving left anterior descending (LAD) coronary artery (H)      HDL deficiency      Impaired fasting glucose      Mixed hyperlipidemia Yes       CURRENT MEDICATIONS:  Current Outpatient Prescriptions   Medication Sig Dispense  Refill     aspirin 81 MG EC tablet Take 1 tablet (81 mg) by mouth daily 100 tablet 3     clopidogrel (PLAVIX) 75 MG tablet Take 1 tablet (75 mg) by mouth daily 90 tablet 3     lisinopril (PRINIVIL/ZESTRIL) 5 MG tablet Take 1 tablet (5 mg) by mouth daily 90 tablet 3     metoprolol succinate (TOPROL-XL) 25 MG 24 hr tablet Take 1 tablet (25 mg) by mouth daily 90 tablet 3     nitroGLYcerin (NITROSTAT) 0.4 MG sublingual tablet For chest pain place 1 tablet under the tongue every 5 minutes for 3 doses. If symptoms persist 5 minutes after 1st dose call 911. 25 tablet 3     Omeprazole Magnesium (PRILOSEC OTC PO) Take 20 mg by mouth daily as needed       rosuvastatin (CRESTOR) 40 MG tablet Take 1 tablet (40 mg) by mouth daily 90 tablet 3     [DISCONTINUED] lisinopril (PRINIVIL/ZESTRIL) 5 MG tablet Take 1 tablet (5 mg) by mouth daily 30 tablet 3       ALLERGIES     Allergies   Allergen Reactions     Codeine      reaction       PAST MEDICAL HISTORY:  Past Medical History:   Diagnosis Date     CAD (coronary artery disease), native coronary artery     cardiac cath July 2017: CINDY to LAD     Hyperlipidemia      STEMI (ST elevation myocardial infarction) (H)        PAST SURGICAL HISTORY:  No past surgical history on file.    FAMILY HISTORY:  Family History   Problem Relation Age of Onset     Colon Cancer Mother      C.A.D. Mother      liver     Myocardial Infarction Father      Heart Failure Father        SOCIAL HISTORY:  Social History     Social History     Marital status:      Spouse name: N/A     Number of children: N/A     Years of education: N/A     Social History Main Topics     Smoking status: Former Smoker     Types: Cigarettes     Smokeless tobacco: Never Used     Alcohol use Yes      Comment: social     Drug use: No     Sexual activity: Yes     Partners: Female     Other Topics Concern     Parent/Sibling W/ Cabg, Mi Or Angioplasty Before 65f 55m? No     Social History Narrative       Review of Systems:  Skin:   "Negative       Eyes:  Negative      ENT:  Negative      Respiratory:  Negative       Cardiovascular:    Positive for;lightheadedness;dizziness (2 episodes of light headed)    Gastroenterology: Negative      Genitourinary:  Negative      Musculoskeletal:  Negative      Neurologic:  Negative      Psychiatric:  Negative      Heme/Lymph/Imm:  Positive for allergies    Endocrine:  Negative        Physical Exam:  Vitals: /68  Pulse 58  Ht 1.829 m (6' 0.02\")  Wt 111.5 kg (245 lb 14.4 oz)  BMI 33.33 kg/m2    Constitutional:  cooperative, alert and oriented, well developed, well nourished, in no acute distress overweight      Skin:  warm and dry to the touch, no apparent skin lesions or masses noted          Head:  normocephalic, no masses or lesions        Eyes:  pupils equal and round, conjunctivae and lids unremarkable, sclera white, no xanthalasma, EOMS intact, no nystagmus        Lymph:      ENT:  no pallor or cyanosis, dentition good        Neck:  carotid pulses are full and equal bilaterally;no carotid bruit        Respiratory:  normal breath sounds, clear to auscultation, normal A-P diameter, normal symmetry, normal respiratory excursion, no use of accessory muscles         Cardiac: regular rhythm;normal S1 and S2;no murmurs, gallops or rubs detected                pulses full and equal                                        GI:           Extremities and Muscular Skeletal:  no edema;no spinal abnormalities noted;normal muscle strength and tone              Neurological:  no gross motor deficits        Psych:  affect appropriate, oriented to time, person and place        CC  Brandon Hurley MD  [address]  [place] [address]                Thank you for allowing me to participate in the care of your patient.      Sincerely,     Brandon Hurley MD     [facility]    cc:   Brandon Hurley MD  [address]  [place] [address]        "

## 2018-10-12 NOTE — PROGRESS NOTES
HPI and Plan:   See dictation    Orders Placed This Encounter   Procedures     Lipid Profile     ALT     Hemoglobin A1c     Basic metabolic panel     Lipid Profile     ALT     Follow-Up with Cardiac Advanced Practice Provider     Follow-Up with Cardiac Advanced Practice Provider     Follow-Up with Cardiologist       Orders Placed This Encounter   Medications     rosuvastatin (CRESTOR) 40 MG tablet     Sig: Take 1 tablet (40 mg) by mouth daily     Dispense:  90 tablet     Refill:  3     lisinopril (PRINIVIL/ZESTRIL) 5 MG tablet     Sig: Take 1 tablet (5 mg) by mouth daily     Dispense:  90 tablet     Refill:  3     metoprolol succinate (TOPROL-XL) 25 MG 24 hr tablet     Sig: Take 1 tablet (25 mg) by mouth daily     Dispense:  90 tablet     Refill:  3     clopidogrel (PLAVIX) 75 MG tablet     Sig: Take 1 tablet (75 mg) by mouth daily     Dispense:  90 tablet     Refill:  3     aspirin 81 MG EC tablet     Sig: Take 1 tablet (81 mg) by mouth daily     Dispense:  100 tablet     Refill:  3       Medications Discontinued During This Encounter   Medication Reason     atorvastatin (LIPITOR) 80 MG tablet      metoprolol tartrate (LOPRESSOR) 25 MG tablet      ticagrelor (BRILINTA) 90 MG tablet      lisinopril (PRINIVIL/ZESTRIL) 5 MG tablet Reorder     aspirin 81 MG EC tablet Reorder         Encounter Diagnoses   Name Primary?     Coronary artery disease involving native coronary artery of native heart without angina pectoris      Benign essential hypertension      ST elevation myocardial infarction involving left anterior descending (LAD) coronary artery (H)      HDL deficiency      Impaired fasting glucose      Mixed hyperlipidemia Yes       CURRENT MEDICATIONS:  Current Outpatient Prescriptions   Medication Sig Dispense Refill     aspirin 81 MG EC tablet Take 1 tablet (81 mg) by mouth daily 100 tablet 3     clopidogrel (PLAVIX) 75 MG tablet Take 1 tablet (75 mg) by mouth daily 90 tablet 3     lisinopril (PRINIVIL/ZESTRIL) 5  MG tablet Take 1 tablet (5 mg) by mouth daily 90 tablet 3     metoprolol succinate (TOPROL-XL) 25 MG 24 hr tablet Take 1 tablet (25 mg) by mouth daily 90 tablet 3     nitroGLYcerin (NITROSTAT) 0.4 MG sublingual tablet For chest pain place 1 tablet under the tongue every 5 minutes for 3 doses. If symptoms persist 5 minutes after 1st dose call 911. 25 tablet 3     Omeprazole Magnesium (PRILOSEC OTC PO) Take 20 mg by mouth daily as needed       rosuvastatin (CRESTOR) 40 MG tablet Take 1 tablet (40 mg) by mouth daily 90 tablet 3     [DISCONTINUED] lisinopril (PRINIVIL/ZESTRIL) 5 MG tablet Take 1 tablet (5 mg) by mouth daily 30 tablet 3       ALLERGIES     Allergies   Allergen Reactions     Codeine      reaction       PAST MEDICAL HISTORY:  Past Medical History:   Diagnosis Date     CAD (coronary artery disease), native coronary artery     cardiac cath July 2017: CINDY to LAD     Hyperlipidemia      STEMI (ST elevation myocardial infarction) (H)        PAST SURGICAL HISTORY:  No past surgical history on file.    FAMILY HISTORY:  Family History   Problem Relation Age of Onset     Colon Cancer Mother      C.A.D. Mother      liver     Myocardial Infarction Father      Heart Failure Father        SOCIAL HISTORY:  Social History     Social History     Marital status:      Spouse name: N/A     Number of children: N/A     Years of education: N/A     Social History Main Topics     Smoking status: Former Smoker     Types: Cigarettes     Smokeless tobacco: Never Used     Alcohol use Yes      Comment: social     Drug use: No     Sexual activity: Yes     Partners: Female     Other Topics Concern     Parent/Sibling W/ Cabg, Mi Or Angioplasty Before 65f 55m? No     Social History Narrative       Review of Systems:  Skin:  Negative       Eyes:  Negative      ENT:  Negative      Respiratory:  Negative       Cardiovascular:    Positive for;lightheadedness;dizziness (2 episodes of light headed)    Gastroenterology: Negative     "  Genitourinary:  Negative      Musculoskeletal:  Negative      Neurologic:  Negative      Psychiatric:  Negative      Heme/Lymph/Imm:  Positive for allergies    Endocrine:  Negative        Physical Exam:  Vitals: /68  Pulse 58  Ht 1.829 m (6' 0.02\")  Wt 111.5 kg (245 lb 14.4 oz)  BMI 33.33 kg/m2    Constitutional:  cooperative, alert and oriented, well developed, well nourished, in no acute distress overweight      Skin:  warm and dry to the touch, no apparent skin lesions or masses noted          Head:  normocephalic, no masses or lesions        Eyes:  pupils equal and round, conjunctivae and lids unremarkable, sclera white, no xanthalasma, EOMS intact, no nystagmus        Lymph:      ENT:  no pallor or cyanosis, dentition good        Neck:  carotid pulses are full and equal bilaterally;no carotid bruit        Respiratory:  normal breath sounds, clear to auscultation, normal A-P diameter, normal symmetry, normal respiratory excursion, no use of accessory muscles         Cardiac: regular rhythm;normal S1 and S2;no murmurs, gallops or rubs detected                pulses full and equal                                        GI:           Extremities and Muscular Skeletal:  no edema;no spinal abnormalities noted;normal muscle strength and tone              Neurological:  no gross motor deficits        Psych:  affect appropriate, oriented to time, person and place        CC  Brandon Hurley MD  [address]  [place] [address]              "

## 2018-10-12 NOTE — MR AVS SNAPSHOT
After Visit Summary   10/12/2018    Bowen Tipton    MRN: [id number]           Patient Information     Date Of Birth          1960        Visit Information        Provider Department      10/12/2018 12:45 PM Brandon Hurley MD [facility]        Today's Diagnoses     Coronary artery disease involving native coronary artery of native heart without angina pectoris        Benign essential hypertension        ST elevation myocardial infarction involving left anterior descending (LAD) coronary artery (H)           Follow-ups after your visit        Additional Services     Follow-Up with Cardiac Advanced Practice Provider                 Future tests that were ordered for you today     Open Future Orders        Priority Expected Expires Ordered    Hemoglobin A1c Routine 11/12/2018 10/12/2019 10/12/2018    Lipid Profile Routine 11/11/2018 10/12/2019 10/12/2018    ALT Routine 11/11/2018 10/12/2019 10/12/2018    Follow-Up with Cardiac Advanced Practice Provider Routine 11/11/2018 10/12/2019 10/12/2018            Who to contact     If you have questions or need follow up information about today's clinic visit or your schedule please contact [facility] directly at [phone].  Normal or non-critical lab and imaging results will be communicated to you by MyChart, letter or phone within 4 business days after the clinic has received the results. If you do not hear from us within 7 days, please contact the clinic through MyChart or phone. If you have a critical or abnormal lab result, we will notify you by phone as soon as possible.  Submit refill requests through [employer] or call your pharmacy and they will forward the refill request to us. Please allow 3 business days for your refill to be completed.          Additional Information About Your Visit        Care EveryWhere ID     This is your Care EveryWhere ID. This could be  "used by other organizations to access your [facility] medical records  [id number]        Your Vitals Were     Pulse Height BMI (Body Mass Index)             58 1.829 m (6' 0.02\") 33.33 kg/m2          Blood Pressure from Last 3 Encounters:   10/12/18 100/68   10/04/17 120/71   08/26/17 130/86    Weight from Last 3 Encounters:   10/12/18 111.5 kg (245 lb 14.4 oz)   11/07/17 106.1 kg (233 lb 12.8 oz)   10/18/17 107 kg (236 lb)              We Performed the Following     Follow-Up with Cardiologist          Today's Medication Changes          These changes are accurate as of 10/12/18  1:36 PM.  If you have any questions, ask your nurse or doctor.               Start taking these medicines.        Dose/Directions    clopidogrel 75 MG tablet   Commonly known as:  PLAVIX   Used for:  Coronary artery disease involving native coronary artery of native heart without angina pectoris   Started by:  Brandon Hurley MD        Dose:  75 mg   Take 1 tablet (75 mg) by mouth daily   Quantity:  90 tablet   Refills:  3       metoprolol succinate 25 MG 24 hr tablet   Commonly known as:  TOPROL-XL   Used for:  Coronary artery disease involving native coronary artery of native heart without angina pectoris   Started by:  Brandon Hurley MD        Dose:  25 mg   Take 1 tablet (25 mg) by mouth daily   Quantity:  90 tablet   Refills:  3       rosuvastatin 40 MG tablet   Commonly known as:  CRESTOR   Used for:  Coronary artery disease involving native coronary artery of native heart without angina pectoris   Started by:  Brandon Hurley MD        Dose:  40 mg   Take 1 tablet (40 mg) by mouth daily   Quantity:  90 tablet   Refills:  3         Stop taking these medicines if you haven't already. Please contact your care team if you have questions.     atorvastatin 80 MG tablet   Commonly known as:  LIPITOR   Stopped by:  Brandon Hurley MD           metoprolol tartrate 25 MG tablet   Commonly known as:  LOPRESSOR   Stopped " by:  Brandon Hurley MD           ticagrelor 90 MG tablet   Commonly known as:  BRILINTA   Stopped by:  Brandon Hurley MD                Where to get your medicines      These medications were sent to [facility]/pharmacy #[id number] - [place], [place] - [address] [address]  [address] [address] [address]    Hours:  [facility] Phone:  [phone]     aspirin 81 MG EC tablet    clopidogrel 75 MG tablet    lisinopril 5 MG tablet    metoprolol succinate 25 MG 24 hr tablet    rosuvastatin 40 MG tablet                Primary Care Provider Fax #    Physician No Ref-Primary [phone]       No address on file        Equal Access to Services     [facility]: Hadii shaji henry hadasho Gregg, waaxda luqadaha, qaybta kaalmada dragan, lalo gresham . So [facility] [phone].    ATENCIÓN: Si habla español, tiene a clay disposición servicios gratuitos de asistencia lingüística. Ll[facility] [phone].    We comply with applicable federal civil rights laws and [place] laws. We do not discriminate on the basis of race, color, national origin, age, disability, sex, sexual orientation, or gender identity.            Thank you!     Thank you for choosing [facility]  for your care. Our goal is always to provide you with excellent care. Hearing back from our patients is one way we can continue to improve our services. Please take a few minutes to complete the written survey that you may receive in the mail after your visit with us. Thank you!             Your Updated Medication List - Protect others around you: Learn how to safely use, store and throw away your medicines at www.disposemymeds.org.          This list is accurate as of 10/12/18  1:36 PM.  Always use your most recent med list.                   Brand Name Dispense Instructions for use Diagnosis    aspirin 81 MG EC tablet     100 tablet    Take 1 tablet (81 mg) by mouth daily    ST  elevation myocardial infarction involving left anterior descending (LAD) coronary artery (H)       clopidogrel 75 MG tablet    PLAVIX    90 tablet    Take 1 tablet (75 mg) by mouth daily    Coronary artery disease involving native coronary artery of native heart without angina pectoris       lisinopril 5 MG tablet    PRINIVIL/ZESTRIL    90 tablet    Take 1 tablet (5 mg) by mouth daily    Benign essential hypertension, Coronary artery disease involving native coronary artery of native heart without angina pectoris       metoprolol succinate 25 MG 24 hr tablet    TOPROL-XL    90 tablet    Take 1 tablet (25 mg) by mouth daily    Coronary artery disease involving native coronary artery of native heart without angina pectoris       nitroGLYcerin 0.4 MG sublingual tablet    NITROSTAT    25 tablet    For chest pain place 1 tablet under the tongue every 5 minutes for 3 doses. If symptoms persist 5 minutes after 1st dose call 911.    ST elevation myocardial infarction involving left anterior descending (LAD) coronary artery (H)       PRILOSEC OTC PO      Take 20 mg by mouth daily as needed        rosuvastatin 40 MG tablet    CRESTOR    90 tablet    Take 1 tablet (40 mg) by mouth daily    Coronary artery disease involving native coronary artery of native heart without angina pectoris

## 2018-10-12 NOTE — PROGRESS NOTES
Service Date: 10/12/2018      CLINIC VISIT      HISTORY OF PRESENT ILLNESS:  Mr. Tipton is a very nice 58-year-old gentleman with past medical history significant for tobacco abuse, ETOH abuse, hypercholesterolemia and a family history of coronary artery disease.  I met him in 07/2017 when he presented with acute anterior wall ST-segment elevation myocardial infarction.  We stented his left anterior descending artery.  Fortunately, troponins peaked at only 2.5.  Followup echocardiogram was poor quality but appeared to demonstrate ejection fraction of 50%-55%.        Fortunately, Mr. Tipton quit smoking at that time.  He initially tightened up his diet and managed to lose weight but now it appears that he has backslid.  He states he is not doing any formal exercise regimen and not watching his diet as closely.  Otherwise, he states he is doing fine.  He is having no exertional chest, arm, neck, jaw or shoulder discomfort.  No dyspnea on exertion, orthopnea or PND.  He has occasional lightheadedness and orthostasis but states he has had very little of this recently.  He notes no bleeding problems with his Brilinta therapy.  He notes no side effects with his other medications.  He asks whether I would write for some Ambien, as he has occasional problem with getting to sleep at nighttime.      ASSESSMENT AND PLAN:  Bowen appears to be doing quite well from a cardiac standpoint without clinical evidence of ischemia, heart failure or significant arrhythmia.      I have told him we could probably stop his Brilinta when his current dose runs out which will last him another 2 months which gives him 18 months of dual antiplatelet therapy.  He states he has a friend who stopped his second blood thinner and within a month had another myocardial infarction, so he is quite concerned about this.  After some discussion, we decided we would run out his Brilinta.  At that time, we will replace it with Plavix 75 mg a day and will  continue that for now and we will discuss it again next year.      Blood pressure is very well controlled at 100/68 with a pulse of 58.  As stated, he does have orthostasis.  We talked about backing off of his medications.  He does not want to.  He states, in the past, his blood pressure has been high and he likes the fact that his blood pressure is well controlled right now.      Fasting lipid profile:  Total cholesterol is 155, HDL is 36, LDL is 93, triglycerides are 128.  Given his HDL deficiency and the fact that his LDL is still 93, I will switch from atorvastatin to rosuvastatin 40 mg and we will see what his fasting lipid profile is.  I would like to drive that down further.  We again talked about the importance of diet, exercise and weight loss in helping this along.        He asks about his glucose intolerance.  Indeed, his glucose is 117.  It has been higher in the past.  Again, we talked about a low-carbohydrate diet, regular exercise and weight loss as all things to improve this.  He does not follow with a primary care doctor.  I have told him he should.  I will check a hemoglobin A1c when he comes in for his fasting lipid profile in 1 month.  I have him see my BRAEDEN at that time.      Basic metabolic profile is excellent with a creatinine of 1.15, BUN of 20, potassium of 4.4 and a sodium of 137.      I have emphasized the importance of a regular exercise regimen, low-carbohydrate diet and weight loss.  I will have him follow up with my BRAEDEN in 1 year.  I will see him back in 2 years.  If he should have any problems, I would be glad to see him sooner.      Thank you for allowing me to participate in his care.      Brandon Russo MD, FACC         BRANDON RUSSO MD, FACC             D: 10/12/2018   T: 10/12/2018   MT: MECHELLE      Name:     ELEAZAR MITCHELL   MRN:      [id number]        Account:      [id number]   :      1960           Service Date: 10/12/2018      Document: [id number]

## 2018-11-12 DIAGNOSIS — I25.10 CORONARY ARTERY DISEASE INVOLVING NATIVE CORONARY ARTERY OF NATIVE HEART WITHOUT ANGINA PECTORIS: ICD-10-CM

## 2018-11-12 LAB
ALT SERPL W P-5'-P-CCNC: 22 U/L (ref 5–30)
CHOLEST SERPL-MCNC: 124 MG/DL
HBA1C MFR BLD: 5.9 % (ref 0–5.6)
HDLC SERPL-MCNC: 35 MG/DL
LDLC SERPL CALC-MCNC: 65 MG/DL
NONHDLC SERPL-MCNC: 89 MG/DL
TRIGL SERPL-MCNC: 118 MG/DL

## 2018-11-12 PROCEDURE — 84460 ALANINE AMINO (ALT) (SGPT): CPT | Performed by: INTERNAL MEDICINE

## 2018-11-12 PROCEDURE — 80061 LIPID PANEL: CPT | Performed by: INTERNAL MEDICINE

## 2018-11-12 PROCEDURE — 83036 HEMOGLOBIN GLYCOSYLATED A1C: CPT | Performed by: INTERNAL MEDICINE

## 2018-11-12 PROCEDURE — 36415 COLL VENOUS BLD VENIPUNCTURE: CPT | Performed by: INTERNAL MEDICINE

## 2018-11-13 ENCOUNTER — OFFICE VISIT (OUTPATIENT)
Dept: CARDIOLOGY | Facility: CLINIC | Age: 58
End: 2018-11-13
Attending: INTERNAL MEDICINE
Payer: COMMERCIAL

## 2018-11-13 VITALS
BODY MASS INDEX: 33.16 KG/M2 | HEART RATE: 75 BPM | WEIGHT: 244.8 LBS | HEIGHT: 72 IN | DIASTOLIC BLOOD PRESSURE: 82 MMHG | SYSTOLIC BLOOD PRESSURE: 120 MMHG

## 2018-11-13 DIAGNOSIS — I25.10 CORONARY ARTERY DISEASE INVOLVING NATIVE CORONARY ARTERY OF NATIVE HEART WITHOUT ANGINA PECTORIS: Primary | ICD-10-CM

## 2018-11-13 DIAGNOSIS — E78.2 MIXED HYPERLIPIDEMIA: ICD-10-CM

## 2018-11-13 PROCEDURE — 99214 OFFICE O/P EST MOD 30 MIN: CPT | Performed by: PHYSICIAN ASSISTANT

## 2018-11-13 NOTE — PROGRESS NOTES
Cardiology Progress Note    Date of Service: 11/13/2018      Reason for visit: Follow up hypercholesterolemia, coronary artery disease.    Primary cardiologist: Dr. Brandon Hurley      HPI:  Mr. Tipton is a very nice 58-year-old male with a PMHx including hyperlipidemia, prior tobacco abuse/excess ETOH use, and coronary artery disease. In July of 2017 he presented with an acute anterior wall ST-segment elevation myocardial infarction. He had his left anterior descending artery stented at that time. Followup echocardiogram was poor quality but appeared to demonstrate ejection fraction of 50%-55%. He made some lifestyle changes including quitting smoking, and initially also improved his diet. He returned to see Dr. Hurley last month for routine annual follow up at which time he had backslid slightly, and had gained some weight back. He also was not doing any formal exercise regimen and not watching his diet as closely.  Otherwise, he was doing fine with no new ischemic symptoms. It is noted there was a discussion about his DAPT and as he was concerned about stopping this (he had a friend who had a heart attack just after stopping his); thus, plan was to finish his current Brilinta prescription and then transition to Plavix. In addition, his atorvastatin was transitioned to rosuvastatin. He's back today for follow up on these changes.    He tells me he continues to feel well overall. He denies any new exertional chest, arm, or jaw discomfort. He denies any leg edema, orthopnea, or worsening DOYLE. He remains abstinent from tobacco. BP today remains under good control at 120/82 and denies any more orthostasis/dizziness since last visit. Repeat fasting labs today show LDL down to 65 from 93. His HDL is still low at 35 but his TG are 118 down from 128 and total cholesterol is now 124. He is tolerating the rosuvastatin fine. He has successfully transitioned to Plavix and denies any new bleeding issues, though admits  he still bruises easily.      ASSESSMENT/PLAN:    1. Coronary artery disease.   --Hx of acute anterior MI in July 2017, s/p LAD stenting. We will continue DAPT with ASA and Plavix, at patient request. As he is not having any side effects, we will plan to revisit next year whether to transition to ASA alone. He remains free of angina and no evidence of heart failure on exam.   --Continue rosuvastatin 40mg. He is tolerating this well. His LDL is now within goal at 65. His HDL is still low at 35. We spent more time today discussing the need for dietary changes and increased exercise. He tells me he plans to get a part time job that is physically active, including loading a truck and lifting boxes.    --BP is under good control again today. Continue lisinopril and metoprolol.     2. Impaired fasting glucose.   --This was noted on his labs last month. Hemoglobin A1C done today is mildly elevated at 5.9. We again discussed how diet plays a role. I encouraged him to obtain a PCP in the near future for ongoing monitoring as well as routine health maintenance.              Follow up plan: With myself in 1 year, then with Dr. Hurley in 2 years, or sooner if needed.      Orders this Visit:  Orders Placed This Encounter   Procedures     Lipid Profile     Basic metabolic panel     Basic metabolic panel     Follow-Up with Cardiac Advanced Practice Provider     No orders of the defined types were placed in this encounter.    There are no discontinued medications.        CURRENT MEDICATIONS:  Current Outpatient Prescriptions   Medication Sig Dispense Refill     aspirin 81 MG EC tablet Take 1 tablet (81 mg) by mouth daily 100 tablet 3     clopidogrel (PLAVIX) 75 MG tablet Take 1 tablet (75 mg) by mouth daily 90 tablet 3     lisinopril (PRINIVIL/ZESTRIL) 5 MG tablet Take 1 tablet (5 mg) by mouth daily 90 tablet 3     metoprolol succinate (TOPROL-XL) 25 MG 24 hr tablet Take 1 tablet (25 mg) by mouth daily 90 tablet 3      nitroGLYcerin (NITROSTAT) 0.4 MG sublingual tablet For chest pain place 1 tablet under the tongue every 5 minutes for 3 doses. If symptoms persist 5 minutes after 1st dose call 911. 25 tablet 3     Omeprazole Magnesium (PRILOSEC OTC PO) Take 20 mg by mouth daily as needed       rosuvastatin (CRESTOR) 40 MG tablet Take 1 tablet (40 mg) by mouth daily 90 tablet 3       ALLERGIES     Allergies   Allergen Reactions     Codeine      reaction       PAST MEDICAL HISTORY:  Past Medical History:   Diagnosis Date     CAD (coronary artery disease), native coronary artery     cardiac cath July 2017: CINDY to LAD     Hyperlipidemia      Mixed hyperlipidemia 1/6/2015     Diagnosis updated by automated process. Provider to review and confirm.     Persistent insomnia 1/6/2015     STEMI (ST elevation myocardial infarction) (H)        PAST SURGICAL HISTORY:  Past Surgical History:   Procedure Laterality Date     HC LEFT HEART CATHETERIZATION  07/22/2017    Mid LAD STEMI with acute thrombotic lesion with CINDY       FAMILY HISTORY:  Family History   Problem Relation Age of Onset     Colon Cancer Mother      C.A.D. Mother      liver     Myocardial Infarction Father      Heart Failure Father        SOCIAL HISTORY:  Social History     Social History     Marital status:      Spouse name: N/A     Number of children: N/A     Years of education: N/A     Social History Main Topics     Smoking status: Former Smoker     Types: Cigarettes     Smokeless tobacco: Never Used     Alcohol use Yes      Comment: social     Drug use: No     Sexual activity: Yes     Partners: Female     Other Topics Concern     Parent/Sibling W/ Cabg, Mi Or Angioplasty Before 65f 55m? No     Social History Narrative       Review of Systems:  Cardiovascular: negative for chest pain, palpitations, orthopnea, LE edema  Constitutional: negative for chills, sweats, fevers   Resp: Negative for dyspnea at rest, dyspnea on exertion, cough, known chronic lung disease  HEENT:  "Negative for new visual changes, frequent headaches  Gastrointestinal: negative for abdominal pain, diarrhea, blood in stool, nausea, vomiting  Hematologic/lymphatic: negative for current systemic anticoagulation, hx of blood clots. Pos easy bruising.  Musculoskeletal: negative for new back pain, joint pain.  Neurological: negative for focal weakness, LOC, seizures, syncope/presyncope.       Physical Exam:  Vitals: /82  Pulse 75  Ht 1.829 m (6' 0.02\")  Wt 111 kg (244 lb 12.8 oz)  BMI 33.18 kg/m2   Wt Readings from Last 4 Encounters:   11/13/18 111 kg (244 lb 12.8 oz)   10/12/18 111.5 kg (245 lb 14.4 oz)   11/07/17 106.1 kg (233 lb 12.8 oz)   10/18/17 107 kg (236 lb)       GEN:  In general, this is a well nourished  male in no acute distress on room air.  Patient ambulatory.  HEENT:  Pupils equal, round. Sclerae nonicteric.   NECK: Supple, no masses appreciated. Trachea midline. No JVD.  C/V:  Regular rate and rhythm, no murmur, rub or gallop.   RESP: Respirations are unlabored. No use of accessory muscles. Clear to auscultation bilaterally without wheezing, rales, or rhonchi.  GI: Abdomen soft, nontender, nondistended.  EXTREM: No LE edema. No cyanosis or clubbing.  NEURO: Alert and oriented, cooperative. Gait normal. No obvious focal deficits.   PSYCH: Normal affect.  SKIN: Warm and dry. No rashes or petechiae appreciated.       Recent Lab Results:  LIPID RESULTS:  Lab Results   Component Value Date    CHOL 124 11/12/2018    HDL 35 (L) 11/12/2018    LDL 65 11/12/2018    TRIG 118 11/12/2018       BMP RESULTS:  Lab Results   Component Value Date     10/12/2018    POTASSIUM 4.4 10/12/2018    CHLORIDE 101 10/12/2018    CO2 28 10/12/2018    ANIONGAP 12.4 10/12/2018     (H) 10/12/2018    BUN 20 10/12/2018    CR 1.15 10/12/2018    GFRESTIMATED 66 10/12/2018    GFRESTBLACK 79 10/12/2018    LEOLA 9.3 10/12/2018          New/Pertinent imaging results since last visit:  July " 2017     Interpretation Summary     Technically difficult study.  Left ventricular systolic function is low normal.  The visual ejection fraction is estimated at 50-55%.  There is mild anterior, septal, and apical wall hypokinesis.  No previous study is available for comparison.        Jessica Kirby PA-C  [facility]  Pager [phone]

## 2018-11-13 NOTE — LETTER
11/13/2018    Physician No Ref-Primary  No address on file    RE: Bowen Tipton       Dear Colleague,    I had the pleasure of seeing Bowen Tipton in the [facility].      Cardiology Progress Note    Date of Service: 11/13/2018      Reason for visit: Follow up hypercholesterolemia, coronary artery disease.    Primary cardiologist: Dr. Brandon Hurley      HPI:  Mr. Tipton is a very nice 58-year-old male with a PMHx including hyperlipidemia, prior tobacco abuse/excess ETOH use, and coronary artery disease. In July of 2017 he presented with an acute anterior wall ST-segment elevation myocardial infarction. He had his left anterior descending artery stented at that time. Followup echocardiogram was poor quality but appeared to demonstrate ejection fraction of 50%-55%. He made some lifestyle changes including quitting smoking, and initially also improved his diet. He returned to see Dr. Hurley last month for routine annual follow up at which time he had backslid slightly, and had gained some weight back. He also was not doing any formal exercise regimen and not watching his diet as closely.  Otherwise, he was doing fine with no new ischemic symptoms. It is noted there was a discussion about his DAPT and as he was concerned about stopping this (he had a friend who had a heart attack just after stopping his); thus, plan was to finish his current Brilinta prescription and then transition to Plavix. In addition, his atorvastatin was transitioned to rosuvastatin. He's back today for follow up on these changes.    He tells me he continues to feel well overall. He denies any new exertional chest, arm, or jaw discomfort. He denies any leg edema, orthopnea, or worsening DOYLE. He remains abstinent from tobacco. BP today remains under good control at 120/82 and denies any more orthostasis/dizziness since last visit. Repeat fasting labs today show LDL down to 65 from 93. His HDL is still low at  35 but his TG are 118 down from 128 and total cholesterol is now 124. He is tolerating the rosuvastatin fine. He has successfully transitioned to Plavix and denies any new bleeding issues, though admits he still bruises easily.      ASSESSMENT/PLAN:    1. Coronary artery disease.   --Hx of acute anterior MI in July 2017, s/p LAD stenting. We will continue DAPT with ASA and Plavix, at patient request. As he is not having any side effects, we will plan to revisit next year whether to transition to ASA alone. He remains free of angina and no evidence of heart failure on exam.   --Continue rosuvastatin 40mg. He is tolerating this well. His LDL is now within goal at 65. His HDL is still low at 35. We spent more time today discussing the need for dietary changes and increased exercise. He tells me he plans to get a part time job that is physically active, including loading a truck and lifting boxes.    --BP is under good control again today. Continue lisinopril and metoprolol.     2. Impaired fasting glucose.   --This was noted on his labs last month. Hemoglobin A1C done today is mildly elevated at 5.9. We again discussed how diet plays a role. I encouraged him to obtain a PCP in the near future for ongoing monitoring as well as routine health maintenance.              Follow up plan: With myself in 1 year, then with Dr. Hurley in 2 years, or sooner if needed.      Orders this Visit:  Orders Placed This Encounter   Procedures     Lipid Profile     Basic metabolic panel     Basic metabolic panel     Follow-Up with Cardiac Advanced Practice Provider     No orders of the defined types were placed in this encounter.    There are no discontinued medications.        CURRENT MEDICATIONS:  Current Outpatient Prescriptions   Medication Sig Dispense Refill     aspirin 81 MG EC tablet Take 1 tablet (81 mg) by mouth daily 100 tablet 3     clopidogrel (PLAVIX) 75 MG tablet Take 1 tablet (75 mg) by mouth daily 90 tablet 3      lisinopril (PRINIVIL/ZESTRIL) 5 MG tablet Take 1 tablet (5 mg) by mouth daily 90 tablet 3     metoprolol succinate (TOPROL-XL) 25 MG 24 hr tablet Take 1 tablet (25 mg) by mouth daily 90 tablet 3     nitroGLYcerin (NITROSTAT) 0.4 MG sublingual tablet For chest pain place 1 tablet under the tongue every 5 minutes for 3 doses. If symptoms persist 5 minutes after 1st dose call 911. 25 tablet 3     Omeprazole Magnesium (PRILOSEC OTC PO) Take 20 mg by mouth daily as needed       rosuvastatin (CRESTOR) 40 MG tablet Take 1 tablet (40 mg) by mouth daily 90 tablet 3       ALLERGIES     Allergies   Allergen Reactions     Codeine      reaction       PAST MEDICAL HISTORY:  Past Medical History:   Diagnosis Date     CAD (coronary artery disease), native coronary artery     cardiac cath July 2017: CINDY to LAD     Hyperlipidemia      Mixed hyperlipidemia 1/6/2015     Diagnosis updated by automated process. Provider to review and confirm.     Persistent insomnia 1/6/2015     STEMI (ST elevation myocardial infarction) (H)        PAST SURGICAL HISTORY:  Past Surgical History:   Procedure Laterality Date     HC LEFT HEART CATHETERIZATION  07/22/2017    Mid LAD STEMI with acute thrombotic lesion with CINDY       FAMILY HISTORY:  Family History   Problem Relation Age of Onset     Colon Cancer Mother      C.A.D. Mother      liver     Myocardial Infarction Father      Heart Failure Father        SOCIAL HISTORY:  Social History     Social History     Marital status:      Spouse name: N/A     Number of children: N/A     Years of education: N/A     Social History Main Topics     Smoking status: Former Smoker     Types: Cigarettes     Smokeless tobacco: Never Used     Alcohol use Yes      Comment: social     Drug use: No     Sexual activity: Yes     Partners: Female     Other Topics Concern     Parent/Sibling W/ Cabg, Mi Or Angioplasty Before 65f 55m? No     Social History Narrative       Review of Systems:  Cardiovascular: negative for  "chest pain, palpitations, orthopnea, LE edema  Constitutional: negative for chills, sweats, fevers   Resp: Negative for dyspnea at rest, dyspnea on exertion, cough, known chronic lung disease  HEENT: Negative for new visual changes, frequent headaches  Gastrointestinal: negative for abdominal pain, diarrhea, blood in stool, nausea, vomiting  Hematologic/lymphatic: negative for current systemic anticoagulation, hx of blood clots. Pos easy bruising.  Musculoskeletal: negative for new back pain, joint pain.  Neurological: negative for focal weakness, LOC, seizures, syncope/presyncope.       Physical Exam:  Vitals: /82  Pulse 75  Ht 1.829 m (6' 0.02\")  Wt 111 kg (244 lb 12.8 oz)  BMI 33.18 kg/m2   Wt Readings from Last 4 Encounters:   11/13/18 111 kg (244 lb 12.8 oz)   10/12/18 111.5 kg (245 lb 14.4 oz)   11/07/17 106.1 kg (233 lb 12.8 oz)   10/18/17 107 kg (236 lb)       GEN:  In general, this is a well nourished  male in no acute distress on room air.  Patient ambulatory.  HEENT:  Pupils equal, round. Sclerae nonicteric.   NECK: Supple, no masses appreciated. Trachea midline. No JVD.  C/V:  Regular rate and rhythm, no murmur, rub or gallop.   RESP: Respirations are unlabored. No use of accessory muscles. Clear to auscultation bilaterally without wheezing, rales, or rhonchi.  GI: Abdomen soft, nontender, nondistended.  EXTREM: No LE edema. No cyanosis or clubbing.  NEURO: Alert and oriented, cooperative. Gait normal. No obvious focal deficits.   PSYCH: Normal affect.  SKIN: Warm and dry. No rashes or petechiae appreciated.       Recent Lab Results:  LIPID RESULTS:  Lab Results   Component Value Date    CHOL 124 11/12/2018    HDL 35 (L) 11/12/2018    LDL 65 11/12/2018    TRIG 118 11/12/2018       BMP RESULTS:  Lab Results   Component Value Date     10/12/2018    POTASSIUM 4.4 10/12/2018    CHLORIDE 101 10/12/2018    CO2 28 10/12/2018    ANIONGAP 12.4 10/12/2018     (H) 10/12/2018    BUN " 20 10/12/2018    CR 1.15 10/12/2018    GFRESTIMATED 66 10/12/2018    GFRESTBLACK 79 10/12/2018    LEOLA 9.3 10/12/2018          New/Pertinent imaging results since last visit:  July 2017     Interpretation Summary     Technically difficult study.  Left ventricular systolic function is low normal.  The visual ejection fraction is estimated at 50-55%.  There is mild anterior, septal, and apical wall hypokinesis.  No previous study is available for comparison.        Jessica Kirby PA-C  [facility]  Pager [phone]      Thank you for allowing me to participate in the care of your patient.      Sincerely,     CARLY Mejia     [facility]    cc:   Brandon Hurley MD  [address]  [place] [address]

## 2018-11-13 NOTE — MR AVS SNAPSHOT
After Visit Summary   11/13/2018    Eleazar Tipton    MRN: [id number]           Patient Information     Date Of Birth          1960        Visit Information        Provider Department      11/13/2018 12:30 PM Jessica Kirby PA [facility]        Today's Diagnoses     Coronary artery disease involving native coronary artery of native heart without angina pectoris    -  1    Mixed hyperlipidemia          Care Instructions    Visit Summary:    Today we discussed:   No changes today.  Please work on getting more active and good diet changes.     Medication changes:    NONE    Test results:   Results for ELEAZAR TIPTON (MRN [id number]) as of 11/13/2018 12:42   Ref. Range 11/12/2018 11:33   ALT Latest Ref Range: 5 - 30 U/L 22   Hemoglobin A1C Latest Ref Range: 0 - 5.6 % 5.9 (H)   Cholesterol Latest Ref Range: <200 mg/dL 124   HDL Cholesterol Latest Ref Range: >39 mg/dL 35 (L)   LDL Cholesterol Calculated Latest Ref Range: <100 mg/dL 65   Non HDL Cholesterol Latest Ref Range: <130 mg/dL 89   Triglycerides Latest Ref Range: <150 mg/dL 118       Follow up:   With Jessica in 1 year and with Dr. Hurley in 2 years as planned.    Please call my nurse Hawa at [phone] with any questions or concerns.                 Follow-ups after your visit        Additional Services     Follow-Up with Cardiac Advanced Practice Provider                 Future tests that were ordered for you today     Open Future Orders        Priority Expected Expires Ordered    Basic metabolic panel Routine 11/13/2019 11/14/2019 11/13/2018    Lipid Profile Routine 10/1/2019 11/13/2019 11/13/2018    Basic metabolic panel Routine 10/1/2019 11/13/2019 11/13/2018    Follow-Up with Cardiac Advanced Practice Provider Routine 11/13/2019 11/13/2019 11/13/2018            Who to contact     If you have questions or need follow up information about today's clinic visit or your schedule please contact  "[facility]   [place] directly at [phone].  Normal or non-critical lab and imaging results will be communicated to you by MyChart, letter or phone within 4 business days after the clinic has received the results. If you do not hear from us within 7 days, please contact the clinic through MyChart or phone. If you have a critical or abnormal lab result, we will notify you by phone as soon as possible.  Submit refill requests through [employer] or call your pharmacy and they will forward the refill request to us. Please allow 3 business days for your refill to be completed.          Additional Information About Your Visit        Care EveryWhere ID     This is your Care EveryWhere ID. This could be used by other organizations to access your [place] medical records  [id number]        Your Vitals Were     Pulse Height BMI (Body Mass Index)             75 1.829 m (6' 0.02\") 33.18 kg/m2          Blood Pressure from Last 3 Encounters:   11/13/18 120/82   10/12/18 100/68   10/04/17 120/71    Weight from Last 3 Encounters:   11/13/18 111 kg (244 lb 12.8 oz)   10/12/18 111.5 kg (245 lb 14.4 oz)   11/07/17 106.1 kg (233 lb 12.8 oz)              We Performed the Following     Follow-Up with Cardiac Advanced Practice Provider        Primary Care Provider Fax #    Physician No Ref-Primary [phone]       No address on file        Equal Access to Services     DALTON DE LEON : Hadii shaji ku hadasho Sonikiali, waaxda luqadaha, qaybta kaalmada dragan, lalo gresham . So [facility] [phone].    ATENCIÓN: Si habla español, tiene a clay disposición servicios gratuitos de asistencia lingüística. Llame al [phone].    We comply with applicable federal civil rights laws and [place] laws. We do not discriminate on the basis of race, color, national origin, age, disability, sex, sexual orientation, or gender identity.            Thank you!     Thank you for choosing [facility]" [facility]  for your care. Our goal is always to provide you with excellent care. Hearing back from our patients is one way we can continue to improve our services. Please take a few minutes to complete the written survey that you may receive in the mail after your visit with us. Thank you!             Your Updated Medication List - Protect others around you: Learn how to safely use, store and throw away your medicines at www.disposemymeds.org.          This list is accurate as of 11/13/18 12:55 PM.  Always use your most recent med list.                   Brand Name Dispense Instructions for use Diagnosis    aspirin 81 MG EC tablet     100 tablet    Take 1 tablet (81 mg) by mouth daily    ST elevation myocardial infarction involving left anterior descending (LAD) coronary artery (H)       clopidogrel 75 MG tablet    PLAVIX    90 tablet    Take 1 tablet (75 mg) by mouth daily    Coronary artery disease involving native coronary artery of native heart without angina pectoris       lisinopril 5 MG tablet    PRINIVIL/ZESTRIL    90 tablet    Take 1 tablet (5 mg) by mouth daily    Benign essential hypertension, Coronary artery disease involving native coronary artery of native heart without angina pectoris       metoprolol succinate 25 MG 24 hr tablet    TOPROL-XL    90 tablet    Take 1 tablet (25 mg) by mouth daily    Coronary artery disease involving native coronary artery of native heart without angina pectoris       nitroGLYcerin 0.4 MG sublingual tablet    NITROSTAT    25 tablet    For chest pain place 1 tablet under the tongue every 5 minutes for 3 doses. If symptoms persist 5 minutes after 1st dose call 911.    ST elevation myocardial infarction involving left anterior descending (LAD) coronary artery (H)       PRILOSEC OTC PO      Take 20 mg by mouth daily as needed        rosuvastatin 40 MG tablet    CRESTOR    90 tablet    Take 1 tablet (40 mg) by mouth daily    Coronary artery disease  involving native coronary artery of native heart without angina pectoris

## 2018-11-13 NOTE — PATIENT INSTRUCTIONS
Visit Summary:    Today we discussed:   No changes today.  Please work on getting more active and good diet changes.     Medication changes:    NONE    Test results:   Results for ELEAZAR MITCHELL (MRN [id number]) as of 11/13/2018 12:42   Ref. Range 11/12/2018 11:33   ALT Latest Ref Range: 5 - 30 U/L 22   Hemoglobin A1C Latest Ref Range: 0 - 5.6 % 5.9 (H)   Cholesterol Latest Ref Range: <200 mg/dL 124   HDL Cholesterol Latest Ref Range: >39 mg/dL 35 (L)   LDL Cholesterol Calculated Latest Ref Range: <100 mg/dL 65   Non HDL Cholesterol Latest Ref Range: <130 mg/dL 89   Triglycerides Latest Ref Range: <150 mg/dL 118       Follow up:   With Jessica in 1 year and with Dr. Hurley in 2 years as planned.    Please call my nurse Hawa at [phone] with any questions or concerns.

## 2019-11-04 ENCOUNTER — DOCUMENTATION ONLY (OUTPATIENT)
Dept: CARDIOLOGY | Facility: CLINIC | Age: 59
End: 2019-11-04

## 2019-11-04 DIAGNOSIS — I25.10 CORONARY ARTERY DISEASE INVOLVING NATIVE CORONARY ARTERY OF NATIVE HEART WITHOUT ANGINA PECTORIS: ICD-10-CM

## 2019-11-04 DIAGNOSIS — E78.2 MIXED HYPERLIPIDEMIA: Primary | ICD-10-CM

## 2019-11-04 NOTE — LETTER
November 4, 2019       TO: Bowen Tipton  [address]  [facility] [address]       Dear Bowen Tipton,    We are reviewing our overdue orders and see that you are due for follow up with one of our Advanced Practice Providers, as well as for blood work.     Please call our clinic at [phone] to schedule your appointment as soon as possible.    Thank you,    [facility]

## 2020-03-12 DIAGNOSIS — I10 BENIGN ESSENTIAL HYPERTENSION: ICD-10-CM

## 2020-03-12 DIAGNOSIS — I25.10 CORONARY ARTERY DISEASE INVOLVING NATIVE CORONARY ARTERY OF NATIVE HEART WITHOUT ANGINA PECTORIS: ICD-10-CM

## 2020-03-12 DIAGNOSIS — I21.02 ST ELEVATION MYOCARDIAL INFARCTION INVOLVING LEFT ANTERIOR DESCENDING (LAD) CORONARY ARTERY (H): ICD-10-CM

## 2020-03-12 RX ORDER — LISINOPRIL 5 MG/1
5 TABLET ORAL DAILY
Qty: 90 TABLET | Refills: 0 | Status: SHIPPED | OUTPATIENT
Start: 2020-03-12 | End: 2021-02-25

## 2020-03-12 RX ORDER — METOPROLOL SUCCINATE 25 MG/1
25 TABLET, EXTENDED RELEASE ORAL DAILY
Qty: 90 TABLET | Refills: 0 | Status: SHIPPED | OUTPATIENT
Start: 2020-03-12 | End: 2021-02-25

## 2020-03-12 NOTE — TELEPHONE ENCOUNTER
Medication Refilled: ASA, lisinopril, and metoprolol  Last office visit: 11/13/2018 with Jessica Kirby  Last Labs/EKG: NA   Next office visit: OVERDUE ORDERS FOR 11/2019 - 90 DAY ONLY - NO REFILLS - NEEDS OV.   Pharmacy sent to: AMADA Bailon RN

## 2020-03-12 NOTE — LETTER
March 12, 2020       TO: Bowen Tipton  [address]  [facility] [address]       Dear Bowen Tipton,    You have requested a medication refill and our records indicate that you have not been seen by one of our providers for your annual office visit.  We will refill your medication for 3 months, which will allow you enough time to be seen by one of our providers.    Please call our clinic at [phone] to schedule your appointment as soon as possible.    Thank you,    [facility] [place]

## 2020-06-29 ENCOUNTER — TELEPHONE (OUTPATIENT)
Dept: CARDIOLOGY | Facility: CLINIC | Age: 60
End: 2020-06-29

## 2020-06-29 NOTE — TELEPHONE ENCOUNTER
I received a refill request for Metoprolol, Lisinopril, and Aspirin. I called the pharmacy and denied the refill as patient is overdue for follow up.

## 2021-01-15 DIAGNOSIS — E78.2 MIXED HYPERLIPIDEMIA: Primary | ICD-10-CM

## 2021-01-15 DIAGNOSIS — E78.6 HDL DEFICIENCY: Chronic | ICD-10-CM

## 2021-01-15 DIAGNOSIS — I25.10 CORONARY ARTERY DISEASE INVOLVING NATIVE CORONARY ARTERY OF NATIVE HEART WITHOUT ANGINA PECTORIS: ICD-10-CM

## 2021-02-24 DIAGNOSIS — I10 BENIGN ESSENTIAL HYPERTENSION: ICD-10-CM

## 2021-02-24 DIAGNOSIS — I25.10 CORONARY ARTERY DISEASE INVOLVING NATIVE CORONARY ARTERY OF NATIVE HEART WITHOUT ANGINA PECTORIS: ICD-10-CM

## 2021-02-24 NOTE — TELEPHONE ENCOUNTER
Okay to refill Lisinopril and Metoprolol?     Patient called requesting refills in metoprolol succinate 25 mg daily and Lisinopril 5 mg daily. To be sent to [facility] in [place].     Patient states he has not been taking Lisinopril , or metoprolol now for greater than 2 months due to no refills available on his bottles. Has not check BP's  or HR's.   Patient does not remember when he stopped the Crestor 40 mg. Is not taking Plavix either.     Patient states he has been taking ASA daily.  Feels good. Denies chest discomfort, shortness of breath, light headedness or dizziness.    Next scheduled Dr. Hurley visit is 5-24-21.      Will message Dr. Hurley.

## 2021-02-25 RX ORDER — LISINOPRIL 5 MG/1
5 TABLET ORAL DAILY
Qty: 90 TABLET | Refills: 0 | Status: SHIPPED | OUTPATIENT
Start: 2021-02-25 | End: 2021-06-01

## 2021-02-25 RX ORDER — METOPROLOL SUCCINATE 25 MG/1
25 TABLET, EXTENDED RELEASE ORAL DAILY
Qty: 90 TABLET | Refills: 0 | Status: SHIPPED | OUTPATIENT
Start: 2021-02-25 | End: 2021-06-01

## 2021-02-25 RX ORDER — ROSUVASTATIN CALCIUM 40 MG/1
40 TABLET, COATED ORAL DAILY
Qty: 90 TABLET | Refills: 0 | Status: SHIPPED | OUTPATIENT
Start: 2021-02-25 | End: 2021-06-01

## 2021-05-18 ENCOUNTER — PRE VISIT (OUTPATIENT)
Dept: CARDIOLOGY | Facility: CLINIC | Age: 61
End: 2021-05-18

## 2021-05-26 DIAGNOSIS — E78.2 MIXED HYPERLIPIDEMIA: ICD-10-CM

## 2021-05-26 DIAGNOSIS — I25.10 CORONARY ARTERY DISEASE INVOLVING NATIVE CORONARY ARTERY OF NATIVE HEART WITHOUT ANGINA PECTORIS: ICD-10-CM

## 2021-05-26 DIAGNOSIS — E78.6 HDL DEFICIENCY: Chronic | ICD-10-CM

## 2021-05-26 LAB
ALT SERPL W P-5'-P-CCNC: 65 U/L (ref 0–70)
ANION GAP SERPL CALCULATED.3IONS-SCNC: 4 MMOL/L (ref 3–14)
BUN SERPL-MCNC: 18 MG/DL (ref 7–30)
CALCIUM SERPL-MCNC: 8.9 MG/DL (ref 8.5–10.1)
CHLORIDE SERPL-SCNC: 107 MMOL/L (ref 94–109)
CHOLEST SERPL-MCNC: 112 MG/DL
CO2 SERPL-SCNC: 26 MMOL/L (ref 20–32)
CREAT SERPL-MCNC: 0.89 MG/DL (ref 0.66–1.25)
GFR SERPL CREATININE-BSD FRML MDRD: >90 ML/MIN/{1.73_M2}
GLUCOSE SERPL-MCNC: 118 MG/DL (ref 70–99)
HDLC SERPL-MCNC: 37 MG/DL
LDLC SERPL CALC-MCNC: 53 MG/DL
NONHDLC SERPL-MCNC: 75 MG/DL
POTASSIUM SERPL-SCNC: 4.4 MMOL/L (ref 3.4–5.3)
SODIUM SERPL-SCNC: 137 MMOL/L (ref 133–144)
TRIGL SERPL-MCNC: 110 MG/DL

## 2021-05-26 PROCEDURE — 80048 BASIC METABOLIC PNL TOTAL CA: CPT | Performed by: INTERNAL MEDICINE

## 2021-05-26 PROCEDURE — 36415 COLL VENOUS BLD VENIPUNCTURE: CPT | Performed by: INTERNAL MEDICINE

## 2021-05-26 PROCEDURE — 84460 ALANINE AMINO (ALT) (SGPT): CPT | Performed by: INTERNAL MEDICINE

## 2021-05-26 PROCEDURE — 80061 LIPID PANEL: CPT | Performed by: INTERNAL MEDICINE

## 2021-05-27 ENCOUNTER — OFFICE VISIT (OUTPATIENT)
Dept: CARDIOLOGY | Facility: CLINIC | Age: 61
End: 2021-05-27
Attending: INTERNAL MEDICINE
Payer: COMMERCIAL

## 2021-05-27 VITALS
HEIGHT: 72 IN | HEART RATE: 78 BPM | WEIGHT: 240 LBS | DIASTOLIC BLOOD PRESSURE: 80 MMHG | OXYGEN SATURATION: 98 % | BODY MASS INDEX: 32.51 KG/M2 | SYSTOLIC BLOOD PRESSURE: 110 MMHG

## 2021-05-27 DIAGNOSIS — E78.2 MIXED HYPERLIPIDEMIA: Primary | ICD-10-CM

## 2021-05-27 DIAGNOSIS — R73.01 IMPAIRED FASTING GLUCOSE: ICD-10-CM

## 2021-05-27 DIAGNOSIS — E78.6 HDL DEFICIENCY: Chronic | ICD-10-CM

## 2021-05-27 DIAGNOSIS — I25.10 CORONARY ARTERY DISEASE INVOLVING NATIVE CORONARY ARTERY OF NATIVE HEART WITHOUT ANGINA PECTORIS: ICD-10-CM

## 2021-05-27 DIAGNOSIS — E66.09 CLASS 1 OBESITY DUE TO EXCESS CALORIES WITH SERIOUS COMORBIDITY AND BODY MASS INDEX (BMI) OF 32.0 TO 32.9 IN ADULT: ICD-10-CM

## 2021-05-27 DIAGNOSIS — E66.811 CLASS 1 OBESITY DUE TO EXCESS CALORIES WITH SERIOUS COMORBIDITY AND BODY MASS INDEX (BMI) OF 32.0 TO 32.9 IN ADULT: ICD-10-CM

## 2021-05-27 PROCEDURE — 99214 OFFICE O/P EST MOD 30 MIN: CPT | Performed by: INTERNAL MEDICINE

## 2021-05-27 ASSESSMENT — MIFFLIN-ST. JEOR: SCORE: 1936.63

## 2021-05-27 NOTE — LETTER
5/27/2021    Physician No Ref-Primary  No address on file    RE: Bowen Tipton       Dear Colleague,    I had the pleasure of seeing Bowen Tipton in the [facility].    HPI and Plan:   See dictation    Orders Placed This Encounter   Procedures     Basic metabolic panel     Lipid Profile     ALT     Follow-Up with Cardiac Advanced Practice Provider     Follow-Up with Cardiologist       No orders of the defined types were placed in this encounter.      Medications Discontinued During This Encounter   Medication Reason     Omeprazole Magnesium (PRILOSEC OTC PO) Medication Reconciliation Clean Up     clopidogrel (PLAVIX) 75 MG tablet Medication Reconciliation Clean Up         Encounter Diagnoses   Name Primary?     Coronary artery disease involving native coronary artery of native heart without angina pectoris      Mixed hyperlipidemia Yes     HDL deficiency      Impaired fasting glucose      Class 1 obesity due to excess calories with serious comorbidity and body mass index (BMI) of 32.0 to 32.9 in adult        CURRENT MEDICATIONS:  Current Outpatient Medications   Medication Sig Dispense Refill     aspirin (ASA) 81 MG EC tablet Take 1 tablet (81 mg) by mouth daily 90 tablet 0     lisinopril (ZESTRIL) 5 MG tablet Take 1 tablet (5 mg) by mouth daily 90 tablet 0     metoprolol succinate ER (TOPROL-XL) 25 MG 24 hr tablet Take 1 tablet (25 mg) by mouth daily 90 tablet 0     nitroGLYcerin (NITROSTAT) 0.4 MG sublingual tablet For chest pain place 1 tablet under the tongue every 5 minutes for 3 doses. If symptoms persist 5 minutes after 1st dose call 911. 25 tablet 3     rosuvastatin (CRESTOR) 40 MG tablet Take 1 tablet (40 mg) by mouth daily 90 tablet 0       ALLERGIES     Allergies   Allergen Reactions     Codeine      reaction       PAST MEDICAL HISTORY:  Past Medical History:   Diagnosis Date     CAD (coronary artery disease), native coronary artery     cardiac  cath July 2017: CINDY to LAD     Hyperlipidemia      Mixed hyperlipidemia 1/6/2015     Diagnosis updated by automated process. Provider to review and confirm.     Persistent insomnia 1/6/2015     STEMI (ST elevation myocardial infarction) (H)        PAST SURGICAL HISTORY:  Past Surgical History:   Procedure Laterality Date     HC LEFT HEART CATHETERIZATION  07/22/2017    Mid LAD STEMI with acute thrombotic lesion with CINDY       FAMILY HISTORY:  Family History   Problem Relation Age of Onset     Colon Cancer Mother      C.A.D. Mother         liver     Myocardial Infarction Father      Heart Failure Father        SOCIAL HISTORY:  Social History     Socioeconomic History     Marital status:      Spouse name: None     Number of children: None     Years of education: None     Highest education level: None   Occupational History     None   Social Needs     Financial resource strain: None     Food insecurity     Worry: None     Inability: None     Transportation needs     Medical: None     Non-medical: None   Tobacco Use     Smoking status: Former Smoker     Types: Cigarettes     Smokeless tobacco: Never Used   Substance and Sexual Activity     Alcohol use: Yes     Comment: social     Drug use: No     Sexual activity: Yes     Partners: Female   Lifestyle     Physical activity     Days per week: None     Minutes per session: None     Stress: None   Relationships     Social connections     Talks on phone: None     Gets together: None     Attends Scientologist service: None     Active member of club or organization: None     Attends meetings of clubs or organizations: None     Relationship status: None     Intimate partner violence     Fear of current or ex partner: None     Emotionally abused: None     Physically abused: None     Forced sexual activity: None   Other Topics Concern     Parent/sibling w/ CABG, MI or angioplasty before 65F 55M? No   Social History Narrative     None       Review of Systems:  Skin:  Negative        Eyes:  Positive for glasses reading glasses  ENT:  Negative      Respiratory:  Positive for dyspnea on exertion;cough SOB hills and long walks   Cardiovascular:    lightheadedness;Positive for;chest pain sometimes chest pressure  Gastroenterology: Negative      Genitourinary:  Negative      Musculoskeletal:  Negative      Neurologic:  Negative      Psychiatric:  Negative      Heme/Lymph/Imm:  Negative      Endocrine:  Negative        Physical Exam:  Vitals: /80 (BP Location: Right arm, Patient Position: Sitting, Cuff Size: Adult Large)   Pulse 78   Ht 1.829 m (6')   Wt 108.9 kg (240 lb)   SpO2 98%   BMI 32.55 kg/m      Constitutional:  cooperative, alert and oriented, well developed, well nourished, in no acute distress overweight      Skin:  warm and dry to the touch, no apparent skin lesions or masses noted          Head:  normocephalic, no masses or lesions        Eyes:  pupils equal and round, conjunctivae and lids unremarkable, sclera white, no xanthalasma, EOMS intact, no nystagmus        Lymph:      ENT:  no pallor or cyanosis, dentition good        Neck:  carotid pulses are full and equal bilaterally;no carotid bruit        Respiratory:  normal breath sounds, clear to auscultation, normal A-P diameter, normal symmetry, normal respiratory excursion, no use of accessory muscles         Cardiac: regular rhythm;normal S1 and S2;no murmurs, gallops or rubs detected                pulses full and equal                                        GI:           Extremities and Muscular Skeletal:  no edema;no spinal abnormalities noted;normal muscle strength and tone              Neurological:  no gross motor deficits        Psych:  affect appropriate, oriented to time, person and place        CC  Brandon Hurley MD  [address]  [place] [address]                  Thank you for allowing me to participate in the care of your patient.      Sincerely,     Brandon Hurley MD     [facility]  [facility]  cc:   Brandon Hurley MD  [address]  [place]  [place] [address]

## 2021-05-27 NOTE — PROGRESS NOTES
HPI and Plan:   See dictation    Orders Placed This Encounter   Procedures     Basic metabolic panel     Lipid Profile     ALT     Follow-Up with Cardiac Advanced Practice Provider     Follow-Up with Cardiologist       No orders of the defined types were placed in this encounter.      Medications Discontinued During This Encounter   Medication Reason     Omeprazole Magnesium (PRILOSEC OTC PO) Medication Reconciliation Clean Up     clopidogrel (PLAVIX) 75 MG tablet Medication Reconciliation Clean Up         Encounter Diagnoses   Name Primary?     Coronary artery disease involving native coronary artery of native heart without angina pectoris      Mixed hyperlipidemia Yes     HDL deficiency      Impaired fasting glucose      Class 1 obesity due to excess calories with serious comorbidity and body mass index (BMI) of 32.0 to 32.9 in adult        CURRENT MEDICATIONS:  Current Outpatient Medications   Medication Sig Dispense Refill     aspirin (ASA) 81 MG EC tablet Take 1 tablet (81 mg) by mouth daily 90 tablet 0     lisinopril (ZESTRIL) 5 MG tablet Take 1 tablet (5 mg) by mouth daily 90 tablet 0     metoprolol succinate ER (TOPROL-XL) 25 MG 24 hr tablet Take 1 tablet (25 mg) by mouth daily 90 tablet 0     nitroGLYcerin (NITROSTAT) 0.4 MG sublingual tablet For chest pain place 1 tablet under the tongue every 5 minutes for 3 doses. If symptoms persist 5 minutes after 1st dose call 911. 25 tablet 3     rosuvastatin (CRESTOR) 40 MG tablet Take 1 tablet (40 mg) by mouth daily 90 tablet 0       ALLERGIES     Allergies   Allergen Reactions     Codeine      reaction       PAST MEDICAL HISTORY:  Past Medical History:   Diagnosis Date     CAD (coronary artery disease), native coronary artery     cardiac cath July 2017: CINDY to LAD     Hyperlipidemia      Mixed hyperlipidemia 1/6/2015     Diagnosis updated by automated process. Provider to review and confirm.     Persistent insomnia 1/6/2015     STEMI (ST elevation myocardial  infarction) (H)        PAST SURGICAL HISTORY:  Past Surgical History:   Procedure Laterality Date     HC LEFT HEART CATHETERIZATION  07/22/2017    Mid LAD STEMI with acute thrombotic lesion with CINDY       FAMILY HISTORY:  Family History   Problem Relation Age of Onset     Colon Cancer Mother      C.A.D. Mother         liver     Myocardial Infarction Father      Heart Failure Father        SOCIAL HISTORY:  Social History     Socioeconomic History     Marital status:      Spouse name: None     Number of children: None     Years of education: None     Highest education level: None   Occupational History     None   Social Needs     Financial resource strain: None     Food insecurity     Worry: None     Inability: None     Transportation needs     Medical: None     Non-medical: None   Tobacco Use     Smoking status: Former Smoker     Types: Cigarettes     Smokeless tobacco: Never Used   Substance and Sexual Activity     Alcohol use: Yes     Comment: social     Drug use: No     Sexual activity: Yes     Partners: Female   Lifestyle     Physical activity     Days per week: None     Minutes per session: None     Stress: None   Relationships     Social connections     Talks on phone: None     Gets together: None     Attends Amish service: None     Active member of club or organization: None     Attends meetings of clubs or organizations: None     Relationship status: None     Intimate partner violence     Fear of current or ex partner: None     Emotionally abused: None     Physically abused: None     Forced sexual activity: None   Other Topics Concern     Parent/sibling w/ CABG, MI or angioplasty before 65F 55M? No   Social History Narrative     None       Review of Systems:  Skin:  Negative       Eyes:  Positive for glasses reading glasses  ENT:  Negative      Respiratory:  Positive for dyspnea on exertion;cough SOB hills and long walks   Cardiovascular:    lightheadedness;Positive for;chest pain sometimes chest  pressure  Gastroenterology: Negative      Genitourinary:  Negative      Musculoskeletal:  Negative      Neurologic:  Negative      Psychiatric:  Negative      Heme/Lymph/Imm:  Negative      Endocrine:  Negative        Physical Exam:  Vitals: /80 (BP Location: Right arm, Patient Position: Sitting, Cuff Size: Adult Large)   Pulse 78   Ht 1.829 m (6')   Wt 108.9 kg (240 lb)   SpO2 98%   BMI 32.55 kg/m      Constitutional:  cooperative, alert and oriented, well developed, well nourished, in no acute distress overweight      Skin:  warm and dry to the touch, no apparent skin lesions or masses noted          Head:  normocephalic, no masses or lesions        Eyes:  pupils equal and round, conjunctivae and lids unremarkable, sclera white, no xanthalasma, EOMS intact, no nystagmus        Lymph:      ENT:  no pallor or cyanosis, dentition good        Neck:  carotid pulses are full and equal bilaterally;no carotid bruit        Respiratory:  normal breath sounds, clear to auscultation, normal A-P diameter, normal symmetry, normal respiratory excursion, no use of accessory muscles         Cardiac: regular rhythm;normal S1 and S2;no murmurs, gallops or rubs detected                pulses full and equal                                        GI:           Extremities and Muscular Skeletal:  no edema;no spinal abnormalities noted;normal muscle strength and tone              Neurological:  no gross motor deficits        Psych:  affect appropriate, oriented to time, person and place        CC  Brandon Hurley MD  [address]  [place] [address]

## 2021-05-27 NOTE — LETTER
Date:July 20, 2021      Patient was self referred, no letter generated. Do not send.        [facility] Health Information

## 2021-05-27 NOTE — PROGRESS NOTES
Service Date: 05/27/2021    HISTORY OF PRESENT ILLNESS:  Bowen is a very nice 61-year-old gentleman with past medical history significant for tobacco abuse, ETOH abuse, mixed hypercholesterolemia with HDL deficiency, glucose intolerance and obesity, all consistent with metabolic syndrome.  He has a strong family history of the males in his family having coronary artery disease at an early age.      I met him in 2017 when he presented with acute anterior wall myocardial infarction.  We stented his left anterior descending artery.  Fortunately troponins peaked at only 2.5.  Followup echocardiogram was poor quality, but appeared to demonstrate an ejection fraction of 50%-55%.    Unfortunately, he quit smoking at that time and remains off.  He states he drinks alcohol 3 or 4 drinks, but only drinks 3 times a week.  Initially, he was quite good about his diet and managed to lose weight.  He has backslid some.    Bowen returns to clinic stating he thinks he is doing pretty well.  He states occasionally first thing in the morning, he will note some chest pressure or chest discomfort, but he does not have any when he mows his lawn or performs activities at a higher level.  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  His weight is down about 4 pounds from his last visit.    He has decided to stop Plavix.  Initially was concerned about the possibility of stent thrombosis as he had a friend who had stent thrombosis and he stayed on prolonged dual-antiplatelet therapy, but now is just on aspirin only.  He notes no side effects with his current medical regimen.  He states he gets short of breath when walking up a long incline or hill, but states he does not think it is inappropriate.    ASSESSMENT AND PLAN:   Bowen appears to be doing well from a cardiac standpoint.  His chest discomfort is more atypical and he thinks it may be more related to stress or anxiety as he has no exertional component.    He has no symptoms to suggest heart failure or significant arrhythmia.    Blood pressure is ideal at 110/80 with a pulse of 78.    Weight is 240 pounds, which as stated, is down 4 pounds from previous.  Body mass index is still 32.6.  I have congratulated him on this and encouraged him to continue to do so.    I pointed out that even though he has not lost a significant amount of weight, metabolically he is better.  His fasting lipid profile is the best he has ever had.  Total cholesterol is 112, HDL 37, LDL 53, triglycerides are 110 and I have showed him the progression over the years and how every year these numbers are steadily better.  He still has HDL deficiency, but is ratio is far improved.    He still has some glucose intolerance with a glucose of 118, but he has been 130s-160s in the past.  We talked about watching carbohydrates in the diet.  He states he is not very good about that and I also pointed out that the main problem with alcohol in drinking 3-5 drinks is that it is a simple carbohydrate and that he needs to try to regulate that.  I have recommended that he drink just 2 or 3 drinks at any one time and that he keep it to just 2 or 3 times a week and try to continue to improve.    We also talked about the fact that ideally about 30 minutes aerobic activity 5 times a week, but a man of his size with glucose intolerance and mixed hypertriglyceridemia would do very well to do some resistance activity at least twice a week.    ALT is 65, still in normal range, but higher than it has been in the past and again I have cautioned him about his alcohol.    I congratulated him about his cigarette smoking.  I told him this may be the most important thing he has done by quitting cigarettes and staying off  cigarettes.    Basic metabolic profile is normal.  Creatinine is 0.89, giving him a GFR of over 90 with normal electrolytes.    We reviewed all his medications.    We talked about the recent trials demonstrating that long-term Plavix is better long-term aspirin.  At this time, he just wants to stay on his aspirin.  He states he takes in somewhat of a haphazard fashion.  I have asked him to take at least 3 times a week.  Every other day may be adequate for him.    I will have him follow up with my BRAEDEN in 1 year.  I will see him back in 2 years.  If he should have any problems, I will see him sooner.    Brandon Hurley MD, [facility]        D: 2021   T: 2021   MT: LASHAY    Name:     ELEAZAR MITCHELL  MRN:      [id number]        Account:      [id number]   :      1960           Service Date: 2021       Document: [id number]

## 2021-06-01 DIAGNOSIS — I25.10 CORONARY ARTERY DISEASE INVOLVING NATIVE CORONARY ARTERY OF NATIVE HEART WITHOUT ANGINA PECTORIS: ICD-10-CM

## 2021-06-01 DIAGNOSIS — I10 BENIGN ESSENTIAL HYPERTENSION: ICD-10-CM

## 2021-06-01 RX ORDER — ROSUVASTATIN CALCIUM 40 MG/1
40 TABLET, COATED ORAL DAILY
Qty: 90 TABLET | Refills: 3 | Status: SHIPPED | OUTPATIENT
Start: 2021-06-01 | End: 2022-09-01

## 2021-06-01 RX ORDER — LISINOPRIL 5 MG/1
5 TABLET ORAL DAILY
Qty: 90 TABLET | Refills: 3 | Status: SHIPPED | OUTPATIENT
Start: 2021-06-01 | End: 2022-09-01

## 2021-06-01 RX ORDER — METOPROLOL SUCCINATE 25 MG/1
25 TABLET, EXTENDED RELEASE ORAL DAILY
Qty: 90 TABLET | Refills: 3 | Status: SHIPPED | OUTPATIENT
Start: 2021-06-01 | End: 2022-09-01

## 2022-09-01 DIAGNOSIS — I25.10 CORONARY ARTERY DISEASE INVOLVING NATIVE CORONARY ARTERY OF NATIVE HEART WITHOUT ANGINA PECTORIS: ICD-10-CM

## 2022-09-01 DIAGNOSIS — I10 BENIGN ESSENTIAL HYPERTENSION: ICD-10-CM

## 2022-09-01 RX ORDER — METOPROLOL SUCCINATE 25 MG/1
25 TABLET, EXTENDED RELEASE ORAL DAILY
Qty: 90 TABLET | Refills: 0 | Status: SHIPPED | OUTPATIENT
Start: 2022-09-01 | End: 2023-11-15

## 2022-09-01 RX ORDER — ROSUVASTATIN CALCIUM 40 MG/1
40 TABLET, COATED ORAL DAILY
Qty: 90 TABLET | Refills: 0 | Status: SHIPPED | OUTPATIENT
Start: 2022-09-01 | End: 2023-11-15

## 2022-09-01 RX ORDER — LISINOPRIL 5 MG/1
5 TABLET ORAL DAILY
Qty: 90 TABLET | Refills: 0 | Status: SHIPPED | OUTPATIENT
Start: 2022-09-01 | End: 2023-11-15

## 2022-09-01 NOTE — TELEPHONE ENCOUNTER
Received refill request for:  Lisinopril, Metoprolol, Rosuvastatin  Last OV was: 5/27/21 Dr. Hurley  Labs/EKG: BMP/Lipids 5/26/21   F/U scheduled: No future appointments. Overdue orders from 5/2022. Letter sent x 2. Third attempt. 90 days, no further refills until visit made. Letter sent.   Pharmacy: [facility] Refill Guideline reviewed.  Medication meets criteria for refill.    Ling Casarez RN, BSN  09/01/22 at 2:13 PM

## 2022-12-08 ENCOUNTER — TELEPHONE (OUTPATIENT)
Dept: CARDIOLOGY | Facility: CLINIC | Age: 62
End: 2022-12-08

## 2022-12-08 NOTE — TELEPHONE ENCOUNTER
Received refill request for crestor from [facility] Returned with a denial as patient has not been seen since May 2021 and no appointment is scheduled. Patient has been contacted x3.   Left patient a message that this refill has been denied. Encourage patient to contact his pharmacy if he has a new provider that they can contact. If patient wants to return to [facility], left the phone # for schedule so he can set up a new visit.  Faxed denial to [facility] pharmacy.

## 2023-11-15 ENCOUNTER — LAB (OUTPATIENT)
Dept: FAMILY MEDICINE | Facility: CLINIC | Age: 63
End: 2023-11-15

## 2023-11-15 ENCOUNTER — OFFICE VISIT (OUTPATIENT)
Dept: FAMILY MEDICINE | Facility: CLINIC | Age: 63
End: 2023-11-15
Payer: COMMERCIAL

## 2023-11-15 VITALS
RESPIRATION RATE: 16 BRPM | DIASTOLIC BLOOD PRESSURE: 74 MMHG | WEIGHT: 233 LBS | HEART RATE: 73 BPM | TEMPERATURE: 97 F | SYSTOLIC BLOOD PRESSURE: 120 MMHG | BODY MASS INDEX: 31.56 KG/M2 | HEIGHT: 72 IN | OXYGEN SATURATION: 98 %

## 2023-11-15 DIAGNOSIS — Z12.11 SCREEN FOR COLON CANCER: ICD-10-CM

## 2023-11-15 DIAGNOSIS — Z13.220 SCREENING FOR HYPERLIPIDEMIA: ICD-10-CM

## 2023-11-15 DIAGNOSIS — I21.02 ST ELEVATION MYOCARDIAL INFARCTION INVOLVING LEFT ANTERIOR DESCENDING (LAD) CORONARY ARTERY (H): ICD-10-CM

## 2023-11-15 DIAGNOSIS — I10 BENIGN ESSENTIAL HYPERTENSION: ICD-10-CM

## 2023-11-15 DIAGNOSIS — Z00.00 ROUTINE GENERAL MEDICAL EXAMINATION AT A HEALTH CARE FACILITY: Primary | ICD-10-CM

## 2023-11-15 DIAGNOSIS — Z12.5 SCREENING FOR PROSTATE CANCER: ICD-10-CM

## 2023-11-15 DIAGNOSIS — Z23 NEED FOR TDAP VACCINATION: ICD-10-CM

## 2023-11-15 DIAGNOSIS — I25.10 CORONARY ARTERY DISEASE INVOLVING NATIVE CORONARY ARTERY OF NATIVE HEART WITHOUT ANGINA PECTORIS: ICD-10-CM

## 2023-11-15 DIAGNOSIS — Z13.1 SCREENING FOR DIABETES MELLITUS: ICD-10-CM

## 2023-11-15 PROCEDURE — 90715 TDAP VACCINE 7 YRS/> IM: CPT | Performed by: FAMILY MEDICINE

## 2023-11-15 PROCEDURE — 99214 OFFICE O/P EST MOD 30 MIN: CPT | Mod: 25 | Performed by: FAMILY MEDICINE

## 2023-11-15 PROCEDURE — 90471 IMMUNIZATION ADMIN: CPT | Performed by: FAMILY MEDICINE

## 2023-11-15 PROCEDURE — 99386 PREV VISIT NEW AGE 40-64: CPT | Mod: 25 | Performed by: FAMILY MEDICINE

## 2023-11-15 RX ORDER — ROSUVASTATIN CALCIUM 40 MG/1
40 TABLET, COATED ORAL DAILY
Qty: 90 TABLET | Refills: 0 | Status: SHIPPED | OUTPATIENT
Start: 2023-11-15 | End: 2024-02-16

## 2023-11-15 RX ORDER — ROSUVASTATIN CALCIUM 40 MG/1
40 TABLET, COATED ORAL DAILY
Qty: 90 TABLET | Refills: 0 | Status: CANCELLED | OUTPATIENT
Start: 2023-11-15

## 2023-11-15 RX ORDER — METOPROLOL SUCCINATE 25 MG/1
25 TABLET, EXTENDED RELEASE ORAL DAILY
Qty: 90 TABLET | Refills: 0 | Status: SHIPPED | OUTPATIENT
Start: 2023-11-15 | End: 2024-02-16

## 2023-11-15 RX ORDER — ASPIRIN 81 MG/1
81 TABLET ORAL DAILY
Qty: 90 TABLET | Refills: 3 | Status: SHIPPED | OUTPATIENT
Start: 2023-11-15

## 2023-11-15 RX ORDER — LISINOPRIL 5 MG/1
5 TABLET ORAL DAILY
Qty: 90 TABLET | Refills: 0 | Status: SHIPPED | OUTPATIENT
Start: 2023-11-15 | End: 2024-02-16

## 2023-11-15 ASSESSMENT — ENCOUNTER SYMPTOMS
WEAKNESS: 0
HEMATOCHEZIA: 0
SORE THROAT: 0
NERVOUS/ANXIOUS: 1
PARESTHESIAS: 0
DYSURIA: 0
ABDOMINAL PAIN: 0
CHILLS: 0
FEVER: 0
SHORTNESS OF BREATH: 0
ARTHRALGIAS: 0
FREQUENCY: 0
HEARTBURN: 0
DIARRHEA: 0
HEMATURIA: 0
DIZZINESS: 0
PALPITATIONS: 0
JOINT SWELLING: 0
NAUSEA: 0
COUGH: 1
EYE PAIN: 0
MYALGIAS: 0
HEADACHES: 0
CONSTIPATION: 0

## 2023-11-15 NOTE — PROGRESS NOTES
SUBJECTIVE:   Bowen is a 63 year old, presenting for the following:  Physical and Establish Care        11/15/2023     7:20 AM   Additional Questions   Roomed by Zoila PANTOJA CMA       Healthy Habits:     Getting at least 3 servings of Calcium per day:  NO    Bi-annual eye exam:  NO    Dental care twice a year:  NO    Sleep apnea or symptoms of sleep apnea:  Daytime drowsiness    Diet:  Regular (no restrictions)    Frequency of exercise:  4-5 days/week    Duration of exercise:  Less than 15 minutes    Taking medications regularly:  Yes    Medication side effects:  Other    Additional concerns today:  No    Not taking prescriptions every day -- sometimes forgets.     Today's PHQ-2 Score:       11/15/2023     7:15 AM   PHQ-2 ( 1999 [employer])   Q1: Little interest or pleasure in doing things 0   Q2: Feeling down, depressed or hopeless 0   PHQ-2 Score 0   Q1: Little interest or pleasure in doing things Not at all   Q2: Feeling down, depressed or hopeless Not at all   PHQ-2 Score 0           Hyperlipidemia Follow-Up    Are you regularly taking any medication or supplement to lower your cholesterol?   Yes- rosuvastatin  Are you having muscle aches or other side effects that you think could be caused by your cholesterol lowering medication?  No    Hypertension Follow-up    Do you check your blood pressure regularly outside of the clinic? No   Are you following a low salt diet? Yes  Are your blood pressures ever more than 140 on the top number (systolic) OR more   than 90 on the bottom number (diastolic), for example 140/90? No    Denies any headaches, lightheadedness, dizziness, vision changes, chest pain, palpitations, shortness of breath, dyspnea, numbness/tingling.    Have you ever done Advance Care Planning? (For example, a Health Directive, POLST, or a discussion with a medical provider or your loved ones about your wishes): No, advance care planning information given to patient to review.  Patient declined advance care  "planning discussion at this time.    Social History     Tobacco Use    Smoking status: Former     Types: Cigarettes    Smokeless tobacco: Never   Substance Use Topics    Alcohol use: Yes     Comment: social             11/15/2023     7:15 AM   Alcohol Use   Prescreen: >3 drinks/day or >7 drinks/week? No       Last PSA: No results found for: \"PSA\"    Reviewed orders with patient. Reviewed health maintenance and updated orders accordingly - Yes  Lab work is in process    Reviewed and updated as needed this visit by clinical staff    Allergies  Meds              Reviewed and updated as needed this visit by Provider                   Review of Systems   Constitutional:  Negative for chills and fever.   HENT:  Negative for congestion, ear pain, hearing loss and sore throat.    Eyes:  Negative for pain and visual disturbance.   Respiratory:  Positive for cough. Negative for shortness of breath.    Cardiovascular:  Negative for chest pain, palpitations and peripheral edema.   Gastrointestinal:  Negative for abdominal pain, constipation, diarrhea, heartburn, hematochezia and nausea.   Genitourinary:  Negative for dysuria, frequency, genital sores, hematuria, impotence, penile discharge and urgency.   Musculoskeletal:  Negative for arthralgias, joint swelling and myalgias.   Skin:  Negative for rash.   Neurological:  Negative for dizziness, weakness, headaches and paresthesias.   Psychiatric/Behavioral:  Negative for mood changes. The patient is nervous/anxious.        OBJECTIVE:   /74   Pulse 73   Temp 97  F (36.1  C) (Tympanic)   Resp 16   Ht 1.829 m (6')   Wt 105.7 kg (233 lb)   SpO2 98%   BMI 31.60 kg/m      Physical Exam  GENERAL: healthy, alert and no distress  EYES: Eyes grossly normal to inspection  HENT: ear canals and TM's normal, nose and mouth without ulcers or lesions  NECK: no adenopathy and no asymmetry, masses, or scars  RESP: lungs clear to auscultation - no rales, rhonchi or wheezes  CV: " regular rates and rhythm, normal S1 S2, no S3 or S4, and no murmur, click or rub  ABDOMEN: soft, nontender, without hepatosplenomegaly or masses  MS: no gross musculoskeletal defects noted, no edema  SKIN: no suspicious lesions or rashes  PSYCH: mentation appears normal, affect normal/bright      ASSESSMENT/PLAN:   1. Routine general medical examination at a health care facility  Health maintenance reviewed and updated. Emphasized importance of balanced diet and regular exercise.      2. Coronary artery disease involving native coronary artery of native heart without angina pectoris  Stable, asymptomatic. BP under control. Recommend continuing ASA 81 mg daily. Will refill lisinopril, metoprolol, and rosuvastatin. Follow up with cardiology.  - lisinopril (ZESTRIL) 5 MG tablet; Take 1 tablet (5 mg) by mouth daily  Dispense: 90 tablet; Refill: 0  - metoprolol succinate ER (TOPROL XL) 25 MG 24 hr tablet; Take 1 tablet (25 mg) by mouth daily  Dispense: 90 tablet; Refill: 0  - rosuvastatin (CRESTOR) 40 MG tablet; Take 1 tablet (40 mg) by mouth daily  Dispense: 90 tablet; Refill: 0  - Adult Cardiology [facility] Referral; Future    3. ST elevation myocardial infarction involving left anterior descending (LAD) coronary artery (H)  - aspirin 81 MG EC tablet; Take 1 tablet (81 mg) by mouth daily  Dispense: 90 tablet; Refill: 3    4. Screen for colon cancer  - MATILDE([employer]); Future    5. Benign essential hypertension  Well controlled.  - lisinopril (ZESTRIL) 5 MG tablet; Take 1 tablet (5 mg) by mouth daily  Dispense: 90 tablet; Refill: 0    6. Screening for diabetes mellitus  - Comprehensive metabolic panel (BMP + Alb, Alk Phos, ALT, AST, Total. Bili, TP); Future    7. Screening for hyperlipidemia  - Lipid panel reflex to direct LDL Fasting; Future    8. Screening for prostate cancer  - PSA, screen; Future    9. Need for Tdap vaccination  - TDAP 10-64Y (ADACEL,BOOSTRIX)      Patient has been advised of split  billing requirements and indicates understanding: Yes      COUNSELING:   Reviewed preventive health counseling, as reflected in patient instructions        He reports that he has quit smoking. His smoking use included cigarettes. He has never used smokeless tobacco.            Miguel A Hernandez DO  [facility] [place]

## 2023-11-15 NOTE — PATIENT INSTRUCTIONS
Preventive Health Recommendations  Male Ages 50 - 64    Yearly exam:             See your health care provider every year in order to  o   Review health changes.   o   Discuss preventive care.    o   Review your medicines if your doctor has prescribed any.   Have a cholesterol test every 5 years, or more frequently if you are at risk for high cholesterol/heart disease.   Have a diabetes test (fasting glucose) every three years. If you are at risk for diabetes, you should have this test more often.   Have a colonoscopy at age 45, or have a yearly FIT test (stool test). These exams will check for colon cancer.    Talk with your health care provider about whether or not a prostate cancer screening test (PSA) is right for you.  You should be tested each year for STDs (sexually transmitted diseases), if you re at risk.     Shots: Get a flu shot each year. Get a tetanus shot every 10 years.     Nutrition:  Eat at least 5 servings of fruits and vegetables daily.   Eat whole-grain bread, whole-wheat pasta and brown rice instead of white grains and rice.   Get adequate Calcium and Vitamin D.     Lifestyle  Exercise for at least 150 minutes a week (30 minutes a day, 5 days a week). This will help you control your weight and prevent disease.   Limit alcohol to one drink per day.   No smoking.   Wear sunscreen to prevent skin cancer.   See your dentist every six months for an exam and cleaning.   See your eye doctor every 1 to 2 years.

## 2024-02-16 DIAGNOSIS — I10 BENIGN ESSENTIAL HYPERTENSION: ICD-10-CM

## 2024-02-16 DIAGNOSIS — I25.10 CORONARY ARTERY DISEASE INVOLVING NATIVE CORONARY ARTERY OF NATIVE HEART WITHOUT ANGINA PECTORIS: ICD-10-CM

## 2024-02-16 RX ORDER — ROSUVASTATIN CALCIUM 40 MG/1
40 TABLET, COATED ORAL DAILY
Qty: 90 TABLET | Refills: 0 | Status: SHIPPED | OUTPATIENT
Start: 2024-02-16 | End: 2024-05-15

## 2024-02-16 RX ORDER — METOPROLOL SUCCINATE 25 MG/1
25 TABLET, EXTENDED RELEASE ORAL DAILY
Qty: 90 TABLET | Refills: 2 | Status: SHIPPED | OUTPATIENT
Start: 2024-02-16 | End: 2024-05-15

## 2024-02-16 RX ORDER — LISINOPRIL 5 MG/1
5 TABLET ORAL DAILY
Qty: 90 TABLET | Refills: 0 | Status: SHIPPED | OUTPATIENT
Start: 2024-02-16 | End: 2024-05-15

## 2024-05-15 ENCOUNTER — OFFICE VISIT (OUTPATIENT)
Dept: CARDIOLOGY | Facility: CLINIC | Age: 64
End: 2024-05-15
Payer: COMMERCIAL

## 2024-05-15 VITALS
DIASTOLIC BLOOD PRESSURE: 82 MMHG | WEIGHT: 240.4 LBS | BODY MASS INDEX: 32.56 KG/M2 | SYSTOLIC BLOOD PRESSURE: 146 MMHG | HEART RATE: 64 BPM | HEIGHT: 72 IN | OXYGEN SATURATION: 98 %

## 2024-05-15 DIAGNOSIS — R73.01 IMPAIRED FASTING GLUCOSE: ICD-10-CM

## 2024-05-15 DIAGNOSIS — E78.6 HDL DEFICIENCY: Chronic | ICD-10-CM

## 2024-05-15 DIAGNOSIS — E66.811 CLASS 1 OBESITY DUE TO EXCESS CALORIES WITH SERIOUS COMORBIDITY AND BODY MASS INDEX (BMI) OF 32.0 TO 32.9 IN ADULT: ICD-10-CM

## 2024-05-15 DIAGNOSIS — I10 BENIGN ESSENTIAL HYPERTENSION: ICD-10-CM

## 2024-05-15 DIAGNOSIS — E66.09 CLASS 1 OBESITY DUE TO EXCESS CALORIES WITH SERIOUS COMORBIDITY AND BODY MASS INDEX (BMI) OF 32.0 TO 32.9 IN ADULT: ICD-10-CM

## 2024-05-15 DIAGNOSIS — I25.10 CORONARY ARTERY DISEASE INVOLVING NATIVE CORONARY ARTERY OF NATIVE HEART WITHOUT ANGINA PECTORIS: ICD-10-CM

## 2024-05-15 DIAGNOSIS — E78.2 MIXED HYPERLIPIDEMIA: Primary | ICD-10-CM

## 2024-05-15 PROCEDURE — 99214 OFFICE O/P EST MOD 30 MIN: CPT | Performed by: INTERNAL MEDICINE

## 2024-05-15 PROCEDURE — G2211 COMPLEX E/M VISIT ADD ON: HCPCS | Performed by: INTERNAL MEDICINE

## 2024-05-15 RX ORDER — ROSUVASTATIN CALCIUM 40 MG/1
40 TABLET, COATED ORAL DAILY
Qty: 90 TABLET | Refills: 4 | Status: SHIPPED | OUTPATIENT
Start: 2024-05-15

## 2024-05-15 RX ORDER — METOPROLOL SUCCINATE 25 MG/1
25 TABLET, EXTENDED RELEASE ORAL DAILY
Qty: 90 TABLET | Refills: 4 | Status: SHIPPED | OUTPATIENT
Start: 2024-05-15

## 2024-05-15 RX ORDER — LISINOPRIL 5 MG/1
5 TABLET ORAL DAILY
Qty: 90 TABLET | Refills: 4 | Status: SHIPPED | OUTPATIENT
Start: 2024-05-15 | End: 2024-06-18

## 2024-05-15 NOTE — PROGRESS NOTES
HPI and Plan:     Bowen is a very nice 64-year-old gentleman with past medical history significant for tobacco abuse, ETOH abuse, mixed hypercholesterolemia with HDL deficiency, glucose intolerance and obesity, all consistent with metabolic syndrome.  He has a strong family history of the males in his family having coronary artery disease at an early age.       I met him in 2017 when he presented with acute anterior wall myocardial infarction.  We stented his left anterior descending artery.  Fortunately troponins peaked at only 2.5.  Followup echocardiogram was poor quality, but appeared to demonstrate an ejection fraction of 50%-55%.       Fortunately, he quit smoking at that time and remains off.  He states he drinks alcohol 3 or 4 drinks, but only drinks 3 times a week.  Initially, he was quite good about his diet and managed to lose weight.  He has backslid some as he states he is feeling good.     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   He states he gets short of breath when walking up a long incline or hill, but states he does not think it is inappropriate.    He has been haphazard about taking his medications     ASSESSMENT AND PLAN: Clinically Bowen appears to be doing well from a cardiac standpoint without evidence of ischemia, heart failure or significant arrhythmia     Blood pressure is high today probably partially due to whitecoat hypertension but also not taking his medicines consistently.     Weight is 240 pounds body mass index is still 32.6 unchanged from his last visit 3 years ago.  We talked about the importance of weight loss, exercise aerobic and resistance and total calories intake including liquid calories and the fact that the alcohol are empty calories which contribute to  weight and in the and affect blood pressure, glucose tolerance and fasting lipid profile.     As he is not been taking his statin and is not fasting we did not check his cholesterol today.  Plan will be to take his medicines consistently for 1 month and have him come in for blood pressure check with my nurse clinician and we will recheck a fasting lipid profile, ALT and BMP.  Will follow-up on these results     We also talked about the fact that ideally about 30 minutes aerobic activity 5 times a week, but a man of his size with glucose intolerance and mixed hypertriglyceridemia would do very well to do some resistance activity at least twice a week.     In the past he had borderline high liver function tests and as stated we will check this when he returns     I congratulated him about his cigarette smoking.  I told him this may be the most important thing he has done by quitting cigarettes and staying off cigarettes.     We reviewed all his medications.     I will have him follow up with my BRAEDEN in 1 year.  I will see him back in 2 years.  If he should have any problems, I will see him sooner.     Brandon Hurley MD, [facility]     The longitudinal plan of care for the diagnosis(es)/condition(s) as documented were addressed during this visit. Due to the added complexity in care, I will continue to support Bowen in the subsequent management and with ongoing continuity of care.       Today's clinic visit entailed:  Ordering of each unique test  Prescription drug management  35 minutes spent by me on the date of the encounter doing chart review, history and exam, documentation and further activities per the note  Provider  Link to [facility] Help Grid           Orders Placed This Encounter   Procedures    Basic metabolic panel    Lipid Profile    ALT    Basic metabolic panel    Lipid Profile    ALT    Follow-Up with Cardiology Nurse    Follow-Up with Cardiology       No orders of the defined types were placed in this  encounter.      There are no discontinued medications.      Encounter Diagnoses   Name Primary?    Mixed hyperlipidemia Yes    Coronary artery disease involving native coronary artery of native heart without angina pectoris     HDL deficiency     Impaired fasting glucose     Benign essential hypertension     Class 1 obesity due to excess calories with serious comorbidity and body mass index (BMI) of 32.0 to 32.9 in adult        CURRENT MEDICATIONS:  Current Outpatient Medications   Medication Sig Dispense Refill    aspirin 81 MG EC tablet Take 1 tablet (81 mg) by mouth daily 90 tablet 3    lisinopril (ZESTRIL) 5 MG tablet TAKE 1 TABLET BY MOUTH EVERY DAY 90 tablet 0    metoprolol succinate ER (TOPROL XL) 25 MG 24 hr tablet TAKE 1 TABLET BY MOUTH EVERY DAY 90 tablet 2    nitroGLYcerin (NITROSTAT) 0.4 MG sublingual tablet For chest pain place 1 tablet under the tongue every 5 minutes for 3 doses. If symptoms persist 5 minutes after 1st dose call 911. 25 tablet 3    rosuvastatin (CRESTOR) 40 MG tablet TAKE 1 TABLET BY MOUTH EVERY DAY 90 tablet 0       ALLERGIES     Allergies   Allergen Reactions    Codeine      reaction       PAST MEDICAL HISTORY:  Past Medical History:   Diagnosis Date    CAD (coronary artery disease), native coronary artery     cardiac cath July 2017: CINDY to LAD    Hyperlipidemia     Mixed hyperlipidemia 1/6/2015     Diagnosis updated by automated process. Provider to review and confirm.    Persistent insomnia 1/6/2015    STEMI (ST elevation myocardial infarction) (H)        PAST SURGICAL HISTORY:  Past Surgical History:   Procedure Laterality Date     LEFT HEART CATHETERIZATION  07/22/2017    Mid LAD STEMI with acute thrombotic lesion with CINDY       FAMILY HISTORY:  Family History   Problem Relation Age of Onset    Colon Cancer Mother     C.A.D. Mother         liver    Myocardial Infarction Father     Heart Failure Father        SOCIAL HISTORY:  Social History     Socioeconomic History    Marital  status:      Spouse name: None    Number of children: None    Years of education: None    Highest education level: None   Tobacco Use    Smoking status: Former     Current packs/day: 0.00     Average packs/day: 1 pack/day for 30.0 years (30.0 ttl pk-yrs)     Types: Cigarettes     Start date: 1991     Quit date: 2021     Years since quitting: 3.3    Smokeless tobacco: Never   Vaping Use    Vaping status: Never Used   Substance and Sexual Activity    Alcohol use: Yes     Comment: twice weekly, 5-7 drinks at a time    Drug use: No    Sexual activity: Yes     Partners: Female   Other Topics Concern    Parent/sibling w/ CABG, MI or angioplasty before 65F 55M? No     Social Determinants of Health     Financial Resource Strain: Low Risk  (11/15/2023)    Financial Resource Strain     Within the past 12 months, have you or your family members you live with been unable to get utilities (heat, electricity) when it was really needed?: No   Food Insecurity: Low Risk  (11/15/2023)    Food Insecurity     Within the past 12 months, did you worry that your food would run out before you got money to buy more?: No     Within the past 12 months, did the food you bought just not last and you didn t have money to get more?: No   Transportation Needs: Low Risk  (11/15/2023)    Transportation Needs     Within the past 12 months, has lack of transportation kept you from medical appointments, getting your medicines, non-medical meetings or appointments, work, or from getting things that you need?: No   Interpersonal Safety: Low Risk  (11/15/2023)    Interpersonal Safety     Do you feel physically and emotionally safe where you currently live?: Yes     Within the past 12 months, have you been hit, slapped, kicked or otherwise physically hurt by someone?: No     Within the past 12 months, have you been humiliated or emotionally abused in other ways by your partner or ex-partner?: No   Housing Stability: Low Risk  (11/15/2023)    Housing  Stability     Do you have housing? : Yes     Are you worried about losing your housing?: No       Review of Systems:  Skin:          Eyes:         ENT:         Respiratory:  Negative       Cardiovascular:  Negative      Gastroenterology:        Genitourinary:         Musculoskeletal:         Neurologic:         Psychiatric:         Heme/Lymph/Imm:         Endocrine:           Physical Exam:  Vitals: BP (!) 146/82 (BP Location: Right arm, Patient Position: Sitting, Cuff Size: Adult Regular)   Pulse 64   Ht 1.829 m (6')   Wt 109 kg (240 lb 6.4 oz)   SpO2 98%   BMI 32.60 kg/m      Constitutional:  cooperative, alert and oriented, well developed, well nourished, in no acute distress overweight      Skin:  warm and dry to the touch, no apparent skin lesions or masses noted          Head:  normocephalic, no masses or lesions        Eyes:  pupils equal and round, conjunctivae and lids unremarkable, sclera white, no xanthalasma, EOMS intact, no nystagmus        Lymph:      ENT:  no pallor or cyanosis, dentition good        Neck:  no carotid bruit        Respiratory:  normal breath sounds, clear to auscultation, normal A-P diameter, normal symmetry, normal respiratory excursion, no use of accessory muscles         Cardiac: regular rhythm     no presence of murmur          pulses full and equal                                        GI:           Extremities and Muscular Skeletal:  no edema;no spinal abnormalities noted;normal muscle strength and tone              Neurological:  no gross motor deficits        Psych:  affect appropriate, oriented to time, person and place        SIMA Hernandez DO  [address] [place] [address]

## 2024-05-15 NOTE — LETTER
5/15/2024    Physician No Ref-Primary  No address on file    RE: Bowen Tipton       Dear Colleague,     I had the pleasure of seeing Bowen Tipton in the [facility].  HPI and Plan:     Bowen is a very nice 64-year-old gentleman with past medical history significant for tobacco abuse, ETOH abuse, mixed hypercholesterolemia with HDL deficiency, glucose intolerance and obesity, all consistent with metabolic syndrome.  He has a strong family history of the males in his family having coronary artery disease at an early age.       I met him in 2017 when he presented with acute anterior wall myocardial infarction.  We stented his left anterior descending artery.  Fortunately troponins peaked at only 2.5.  Followup echocardiogram was poor quality, but appeared to demonstrate an ejection fraction of 50%-55%.       Fortunately, he quit smoking at that time and remains off.  He states he drinks alcohol 3 or 4 drinks, but only drinks 3 times a week.  Initially, he was quite good about his diet and managed to lose weight.  He has backslid some as he states he is feeling good.     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   He states he gets short of breath when walking up a long incline or hill, but states he does not think it is inappropriate.    He has been haphazard about taking his medications     ASSESSMENT AND PLAN: Clinically Bowen appears to be doing well from a cardiac standpoint without evidence of ischemia, heart failure or significant arrhythmia     Blood pressure is high today probably partially due to whitecoat hypertension but also not taking his medicines consistently.     Weight is 240 pounds body mass index is still 32.6 unchanged from his last visit 3 years ago.  We talked  about the importance of weight loss, exercise aerobic and resistance and total calories intake including liquid calories and the fact that the alcohol are empty calories which contribute to weight and in the and affect blood pressure, glucose tolerance and fasting lipid profile.     As he is not been taking his statin and is not fasting we did not check his cholesterol today.  Plan will be to take his medicines consistently for 1 month and have him come in for blood pressure check with my nurse clinician and we will recheck a fasting lipid profile, ALT and BMP.  Will follow-up on these results     We also talked about the fact that ideally about 30 minutes aerobic activity 5 times a week, but a man of his size with glucose intolerance and mixed hypertriglyceridemia would do very well to do some resistance activity at least twice a week.     In the past he had borderline high liver function tests and as stated we will check this when he returns     I congratulated him about his cigarette smoking.  I told him this may be the most important thing he has done by quitting cigarettes and staying off cigarettes.     We reviewed all his medications.     I will have him follow up with my BRAEDEN in 1 year.  I will see him back in 2 years.  If he should have any problems, I will see him sooner.     Brandon Hurley MD, [facility]     The longitudinal plan of care for the diagnosis(es)/condition(s) as documented were addressed during this visit. Due to the added complexity in care, I will continue to support Bowen in the subsequent management and with ongoing continuity of care.       Today's clinic visit entailed:  Ordering of each unique test  Prescription drug management  35 minutes spent by me on the date of the encounter doing chart review, history and exam, documentation and further activities per the note  Provider  Link to [facility] Help Grid           Orders Placed This Encounter   Procedures    Basic metabolic panel    Lipid  Profile    ALT    Basic metabolic panel    Lipid Profile    ALT    Follow-Up with Cardiology Nurse    Follow-Up with Cardiology       No orders of the defined types were placed in this encounter.      There are no discontinued medications.      Encounter Diagnoses   Name Primary?    Mixed hyperlipidemia Yes    Coronary artery disease involving native coronary artery of native heart without angina pectoris     HDL deficiency     Impaired fasting glucose     Benign essential hypertension     Class 1 obesity due to excess calories with serious comorbidity and body mass index (BMI) of 32.0 to 32.9 in adult        CURRENT MEDICATIONS:  Current Outpatient Medications   Medication Sig Dispense Refill    aspirin 81 MG EC tablet Take 1 tablet (81 mg) by mouth daily 90 tablet 3    lisinopril (ZESTRIL) 5 MG tablet TAKE 1 TABLET BY MOUTH EVERY DAY 90 tablet 0    metoprolol succinate ER (TOPROL XL) 25 MG 24 hr tablet TAKE 1 TABLET BY MOUTH EVERY DAY 90 tablet 2    nitroGLYcerin (NITROSTAT) 0.4 MG sublingual tablet For chest pain place 1 tablet under the tongue every 5 minutes for 3 doses. If symptoms persist 5 minutes after 1st dose call 911. 25 tablet 3    rosuvastatin (CRESTOR) 40 MG tablet TAKE 1 TABLET BY MOUTH EVERY DAY 90 tablet 0       ALLERGIES     Allergies   Allergen Reactions    Codeine      reaction       PAST MEDICAL HISTORY:  Past Medical History:   Diagnosis Date    CAD (coronary artery disease), native coronary artery     cardiac cath July 2017: CINDY to LAD    Hyperlipidemia     Mixed hyperlipidemia 1/6/2015     Diagnosis updated by automated process. Provider to review and confirm.    Persistent insomnia 1/6/2015    STEMI (ST elevation myocardial infarction) (H)        PAST SURGICAL HISTORY:  Past Surgical History:   Procedure Laterality Date     LEFT HEART CATHETERIZATION  07/22/2017    Mid LAD STEMI with acute thrombotic lesion with CINDY       FAMILY HISTORY:  Family History   Problem Relation Age of Onset     Colon Cancer Mother     C.A.D. Mother         liver    Myocardial Infarction Father     Heart Failure Father        SOCIAL HISTORY:  Social History     Socioeconomic History    Marital status:      Spouse name: None    Number of children: None    Years of education: None    Highest education level: None   Tobacco Use    Smoking status: Former     Current packs/day: 0.00     Average packs/day: 1 pack/day for 30.0 years (30.0 ttl pk-yrs)     Types: Cigarettes     Start date: 1991     Quit date: 2021     Years since quitting: 3.3    Smokeless tobacco: Never   Vaping Use    Vaping status: Never Used   Substance and Sexual Activity    Alcohol use: Yes     Comment: twice weekly, 5-7 drinks at a time    Drug use: No    Sexual activity: Yes     Partners: Female   Other Topics Concern    Parent/sibling w/ CABG, MI or angioplasty before 65F 55M? No     Social Determinants of Health     Financial Resource Strain: Low Risk  (11/15/2023)    Financial Resource Strain     Within the past 12 months, have you or your family members you live with been unable to get utilities (heat, electricity) when it was really needed?: No   Food Insecurity: Low Risk  (11/15/2023)    Food Insecurity     Within the past 12 months, did you worry that your food would run out before you got money to buy more?: No     Within the past 12 months, did the food you bought just not last and you didn t have money to get more?: No   Transportation Needs: Low Risk  (11/15/2023)    Transportation Needs     Within the past 12 months, has lack of transportation kept you from medical appointments, getting your medicines, non-medical meetings or appointments, work, or from getting things that you need?: No   Interpersonal Safety: Low Risk  (11/15/2023)    Interpersonal Safety     Do you feel physically and emotionally safe where you currently live?: Yes     Within the past 12 months, have you been hit, slapped, kicked or otherwise physically hurt by someone?:  No     Within the past 12 months, have you been humiliated or emotionally abused in other ways by your partner or ex-partner?: No   Housing Stability: Low Risk  (11/15/2023)    Housing Stability     Do you have housing? : Yes     Are you worried about losing your housing?: No       Review of Systems:  Skin:          Eyes:         ENT:         Respiratory:  Negative       Cardiovascular:  Negative      Gastroenterology:        Genitourinary:         Musculoskeletal:         Neurologic:         Psychiatric:         Heme/Lymph/Imm:         Endocrine:           Physical Exam:  Vitals: BP (!) 146/82 (BP Location: Right arm, Patient Position: Sitting, Cuff Size: Adult Regular)   Pulse 64   Ht 1.829 m (6')   Wt 109 kg (240 lb 6.4 oz)   SpO2 98%   BMI 32.60 kg/m      Constitutional:  cooperative, alert and oriented, well developed, well nourished, in no acute distress overweight      Skin:  warm and dry to the touch, no apparent skin lesions or masses noted          Head:  normocephalic, no masses or lesions        Eyes:  pupils equal and round, conjunctivae and lids unremarkable, sclera white, no xanthalasma, EOMS intact, no nystagmus        Lymph:      ENT:  no pallor or cyanosis, dentition good        Neck:  no carotid bruit        Respiratory:  normal breath sounds, clear to auscultation, normal A-P diameter, normal symmetry, normal respiratory excursion, no use of accessory muscles         Cardiac: regular rhythm     no presence of murmur          pulses full and equal                                        GI:           Extremities and Muscular Skeletal:  no edema;no spinal abnormalities noted;normal muscle strength and tone              Neurological:  no gross motor deficits        Psych:  affect appropriate, oriented to time, person and place        SIMA Hernandez,   [address] [place] [address]        Thank you for allowing me to participate in the care of your patient.      Sincerely,      Brandon Hurley MD     [facility]

## 2024-06-14 ENCOUNTER — TELEPHONE (OUTPATIENT)
Dept: CARDIOLOGY | Facility: CLINIC | Age: 64
End: 2024-06-14

## 2024-06-14 ENCOUNTER — LAB (OUTPATIENT)
Dept: LAB | Facility: CLINIC | Age: 64
End: 2024-06-14
Payer: COMMERCIAL

## 2024-06-14 ENCOUNTER — ALLIED HEALTH/NURSE VISIT (OUTPATIENT)
Dept: CARDIOLOGY | Facility: CLINIC | Age: 64
End: 2024-06-14
Payer: COMMERCIAL

## 2024-06-14 VITALS — HEART RATE: 60 BPM | SYSTOLIC BLOOD PRESSURE: 136 MMHG | DIASTOLIC BLOOD PRESSURE: 78 MMHG

## 2024-06-14 DIAGNOSIS — I25.10 CORONARY ARTERY DISEASE INVOLVING NATIVE CORONARY ARTERY OF NATIVE HEART WITHOUT ANGINA PECTORIS: ICD-10-CM

## 2024-06-14 DIAGNOSIS — I10 BENIGN ESSENTIAL HYPERTENSION: ICD-10-CM

## 2024-06-14 LAB
ALT SERPL W P-5'-P-CCNC: 139 U/L (ref 0–70)
ANION GAP SERPL CALCULATED.3IONS-SCNC: 14 MMOL/L (ref 7–15)
BUN SERPL-MCNC: 19.1 MG/DL (ref 8–23)
CALCIUM SERPL-MCNC: 9.2 MG/DL (ref 8.8–10.2)
CHLORIDE SERPL-SCNC: 101 MMOL/L (ref 98–107)
CHOLEST SERPL-MCNC: 141 MG/DL
CREAT SERPL-MCNC: 0.81 MG/DL (ref 0.67–1.17)
DEPRECATED HCO3 PLAS-SCNC: 21 MMOL/L (ref 22–29)
EGFRCR SERPLBLD CKD-EPI 2021: >90 ML/MIN/1.73M2
FASTING STATUS PATIENT QL REPORTED: YES
GLUCOSE SERPL-MCNC: 149 MG/DL (ref 70–99)
HDLC SERPL-MCNC: 40 MG/DL
LDLC SERPL CALC-MCNC: 82 MG/DL
NONHDLC SERPL-MCNC: 101 MG/DL
POTASSIUM SERPL-SCNC: 4.4 MMOL/L (ref 3.4–5.3)
SODIUM SERPL-SCNC: 136 MMOL/L (ref 135–145)
TRIGL SERPL-MCNC: 95 MG/DL

## 2024-06-14 PROCEDURE — 80061 LIPID PANEL: CPT | Performed by: INTERNAL MEDICINE

## 2024-06-14 PROCEDURE — 84460 ALANINE AMINO (ALT) (SGPT): CPT | Performed by: INTERNAL MEDICINE

## 2024-06-14 PROCEDURE — 99207 PR NO CHARGE LOS: CPT

## 2024-06-14 PROCEDURE — 80048 BASIC METABOLIC PNL TOTAL CA: CPT | Performed by: INTERNAL MEDICINE

## 2024-06-14 PROCEDURE — 36415 COLL VENOUS BLD VENIPUNCTURE: CPT | Performed by: INTERNAL MEDICINE

## 2024-06-14 NOTE — PROGRESS NOTES
[facility] BLOOD PRESSURE CHECK     Last office visit: 5/15/24    Previous blood pressure: 146/82 mm Hg  Previous heart rate: 64 bpm      Time of visit: 950 AM    Morning medications were taken at: 730 am     Today's blood pressure: 136/78 mm Hg  Today's heart rate: 60 bpm     Home monitor blood pressure: n/a mmHg  Home monitor heart rate: n/a bpm      Additional Comments: pt has been taking medications regularly (he states he might have missed a day or two). He does not take BP at home      Results routed to: Team 2      Ordering Provider: Dr Hurley  In clinic Provider: Dr Hurley

## 2024-06-14 NOTE — TELEPHONE ENCOUNTER
Lipids are pending as of 1630 on 6/14/2024.  Will send full message when labs are processed.    Per Dr. Hurley's dictation 5/15/2024:  As he is not been taking his statin and is not fasting we did not check his cholesterol today. Plan will be to take his medicines consistently for 1 month and have him come in for blood pressure check with my nurse clinician and we will recheck a fasting lipid profile, ALT and BMP. Will follow-up on these results

## 2024-06-17 ENCOUNTER — TELEPHONE (OUTPATIENT)
Dept: CARDIOLOGY | Facility: CLINIC | Age: 64
End: 2024-06-17
Payer: COMMERCIAL

## 2024-06-17 DIAGNOSIS — I25.10 CORONARY ARTERY DISEASE INVOLVING NATIVE CORONARY ARTERY OF NATIVE HEART WITHOUT ANGINA PECTORIS: Primary | ICD-10-CM

## 2024-06-17 DIAGNOSIS — I10 BENIGN ESSENTIAL HYPERTENSION: ICD-10-CM

## 2024-06-17 DIAGNOSIS — E78.2 MIXED HYPERLIPIDEMIA: ICD-10-CM

## 2024-06-17 RX ORDER — EZETIMIBE 10 MG/1
10 TABLET ORAL DAILY
Qty: 90 TABLET | Refills: 3 | Status: SHIPPED | OUTPATIENT
Start: 2024-06-17

## 2024-06-17 NOTE — TELEPHONE ENCOUNTER
----- Message from Brandon Hurley MD sent at 6/15/2024  7:29 AM CDT -----  He needs to cut back on his alcohol because his liver function tests and glucose are up.  LDL is not at goal so add Zetia 10 mg to his regiment recheck fasting lipid profile/ALT and BMP in 1 month.  Send numbers to primary care doctor as he needs his glucose addressed for his diabetes.     Spoke to patient, reviewed labs and message from Dr Hurley. He notes he was not fasting, may have had some milk before his labs but he isn't sure. He does continue to drink alcohol, reports having 3-5 shots of whiskey three to five times a week. He expressed that he will try and cut back. He was agreeable to trying zetia, Rx sent to pharmacy and orders placed for repeat BMP/FLP/ALT in 1mo. Scheduling number provided to call and schedule. He does not have a primary but will look into getting one.       BP check was not reviewed by provider, routed back to Dr Hurley to review.

## 2024-06-18 RX ORDER — LISINOPRIL 10 MG/1
10 TABLET ORAL DAILY
Qty: 90 TABLET | Refills: 4 | Status: SHIPPED | OUTPATIENT
Start: 2024-06-18

## 2024-06-18 NOTE — TELEPHONE ENCOUNTER
Brandon Hurley MD  You17 hours ago (3:51 PM)     Lets increase his lisinopril to 10 and recheck a BMP and blood pressure in 2 weeks       Called patient and reviewed recommendations from Dr Hurley. He was agreeable to plan. New Rx sent for lisinopril 10mg tabs, though he will use up his current supply of 5mg tabs, taking two at a time. Orders placed for BP/BMP in 2 weeks, scheduling number provided to call and set these up along with fasting labs in 1mo.

## 2024-07-02 ENCOUNTER — LAB (OUTPATIENT)
Dept: LAB | Facility: CLINIC | Age: 64
End: 2024-07-02
Payer: COMMERCIAL

## 2024-07-02 ENCOUNTER — TELEPHONE (OUTPATIENT)
Dept: CARDIOLOGY | Facility: CLINIC | Age: 64
End: 2024-07-02

## 2024-07-02 ENCOUNTER — ALLIED HEALTH/NURSE VISIT (OUTPATIENT)
Dept: CARDIOLOGY | Facility: CLINIC | Age: 64
End: 2024-07-02
Payer: COMMERCIAL

## 2024-07-02 DIAGNOSIS — I10 BENIGN ESSENTIAL HYPERTENSION: Primary | ICD-10-CM

## 2024-07-02 DIAGNOSIS — I10 BENIGN ESSENTIAL HYPERTENSION: ICD-10-CM

## 2024-07-02 LAB
ANION GAP SERPL CALCULATED.3IONS-SCNC: 14 MMOL/L (ref 7–15)
BUN SERPL-MCNC: 22.6 MG/DL (ref 8–23)
CALCIUM SERPL-MCNC: 8.9 MG/DL (ref 8.8–10.2)
CHLORIDE SERPL-SCNC: 102 MMOL/L (ref 98–107)
CREAT SERPL-MCNC: 1 MG/DL (ref 0.67–1.17)
DEPRECATED HCO3 PLAS-SCNC: 20 MMOL/L (ref 22–29)
EGFRCR SERPLBLD CKD-EPI 2021: 85 ML/MIN/1.73M2
GLUCOSE SERPL-MCNC: 124 MG/DL (ref 70–99)
POTASSIUM SERPL-SCNC: 4.5 MMOL/L (ref 3.4–5.3)
SODIUM SERPL-SCNC: 136 MMOL/L (ref 135–145)

## 2024-07-02 PROCEDURE — 36415 COLL VENOUS BLD VENIPUNCTURE: CPT

## 2024-07-02 PROCEDURE — 80048 BASIC METABOLIC PNL TOTAL CA: CPT

## 2024-07-02 PROCEDURE — 99207 PR NO CHARGE LOS: CPT

## 2024-07-02 NOTE — PROGRESS NOTES
[facility] BLOOD PRESSURE CHECK     Last office visit: 5/15/24    Previous blood pressure: 146/82 mm Hg  Previous heart rate: 64 bpm      Time of visit: 945 am    Morning medications were taken at: 730am     Today's blood pressure: 124/82 mm Hg  Today's heart rate: 65 bpm     Home monitor blood pressure: n/a mmHg  Home monitor heart rate: n/a bpm      Additional Comments:       Results routed to: Team 2      Ordering Provider: Dr Hurley  In clinic Provider: Dr Hamilton

## 2024-07-02 NOTE — TELEPHONE ENCOUNTER
BMP also done, see below. Dr Hurley messaged to review.     Med change made 6/18/24- increase lisinopril to 10mg daily      Component      Latest Ref Rng 6/14/2024  9:35 AM 7/2/2024  9:19 AM   Sodium      135 - 145 mmol/L 136  136    Potassium      3.4 - 5.3 mmol/L 4.4  4.5    Chloride      98 - 107 mmol/L 101  102    Carbon Dioxide (CO2)      22 - 29 mmol/L 21 (L)  20 (L)    Anion Gap      7 - 15 mmol/L 14  14    Urea Nitrogen      8.0 - 23.0 mg/dL 19.1  22.6    Creatinine      0.67 - 1.17 mg/dL 0.81  1.00    GFR Estimate      >60 mL/min/1.73m2 >90  85    Calcium      8.8 - 10.2 mg/dL 9.2  8.9    Glucose      70 - 99 mg/dL 149 (H)  124 (H)

## 2024-07-02 NOTE — TELEPHONE ENCOUNTER
Brandon Hurley MD  You18 minutes ago (4:13 PM)     BP and labs look good.  Continue current meds       Left a message for patient to call back.

## 2024-07-02 NOTE — LETTER
July 10, 2024       TO: Bowen Tipton   [address]  [facility] [address]       [facility], Mr. Tipton,    We were unable to connect with you by phone ([phone]).    Dr. Hurley reviewed your bmp lab results and BP check after increasing the lisinopril to 10mg daily. He is pleased with the results and asks that you continue with the medication.    If your phone number has changed, please send us an update.    Results for orders placed or performed in visit on 07/02/24   Basic metabolic panel     Status: Abnormal   Result Value Ref Range    Sodium 136 135 - 145 mmol/L    Potassium 4.5 3.4 - 5.3 mmol/L    Chloride 102 98 - 107 mmol/L    Carbon Dioxide (CO2) 20 (L) 22 - 29 mmol/L    Anion Gap 14 7 - 15 mmol/L    Urea Nitrogen 22.6 8.0 - 23.0 mg/dL    Creatinine 1.00 0.67 - 1.17 mg/dL    GFR Estimate 85 >60 mL/min/1.73m2    Calcium 8.9 8.8 - 10.2 mg/dL    Glucose 124 (H) 70 - 99 mg/dL     Thank you  Team 2 R.N.s  [phone]  [facility]

## 2024-07-10 NOTE — TELEPHONE ENCOUNTER
Attempted to contact patient to review the bmp and BP check. Left a message and sent a letter for back up.    Mei, Mr. Tipton,    We were unable to connect with you by phone ([phone]).    Dr. Hurley reviewed your bmp lab results and BP check after increasing the lisinopril to 10mg daily. He is pleased with the results and asks that you continue with the medication.    If your phone number has changed, please send us an update.    Thank you  Team 2 R.N.s  [phone]

## 2024-07-12 ENCOUNTER — MYC MEDICAL ADVICE (OUTPATIENT)
Dept: FAMILY MEDICINE | Facility: CLINIC | Age: 64
End: 2024-07-12
Payer: COMMERCIAL

## 2024-07-16 ENCOUNTER — DOCUMENTATION ONLY (OUTPATIENT)
Dept: CARDIOLOGY | Facility: CLINIC | Age: 64
End: 2024-07-16

## 2024-07-16 ENCOUNTER — LAB (OUTPATIENT)
Dept: LAB | Facility: CLINIC | Age: 64
End: 2024-07-16
Payer: COMMERCIAL

## 2024-07-16 ENCOUNTER — ALLIED HEALTH/NURSE VISIT (OUTPATIENT)
Dept: CARDIOLOGY | Facility: CLINIC | Age: 64
End: 2024-07-16
Payer: COMMERCIAL

## 2024-07-16 VITALS — SYSTOLIC BLOOD PRESSURE: 121 MMHG | HEART RATE: 71 BPM | DIASTOLIC BLOOD PRESSURE: 79 MMHG | OXYGEN SATURATION: 97 %

## 2024-07-16 DIAGNOSIS — I25.10 CORONARY ARTERY DISEASE INVOLVING NATIVE CORONARY ARTERY OF NATIVE HEART WITHOUT ANGINA PECTORIS: ICD-10-CM

## 2024-07-16 DIAGNOSIS — E78.2 MIXED HYPERLIPIDEMIA: ICD-10-CM

## 2024-07-16 DIAGNOSIS — I10 HTN (HYPERTENSION): Primary | ICD-10-CM

## 2024-07-16 LAB
ALT SERPL W P-5'-P-CCNC: 83 U/L (ref 0–70)
ANION GAP SERPL CALCULATED.3IONS-SCNC: 19 MMOL/L (ref 7–15)
BUN SERPL-MCNC: 21.7 MG/DL (ref 8–23)
CALCIUM SERPL-MCNC: 8.8 MG/DL (ref 8.8–10.4)
CHLORIDE SERPL-SCNC: 103 MMOL/L (ref 98–107)
CHOLEST SERPL-MCNC: 127 MG/DL
CREAT SERPL-MCNC: 0.9 MG/DL (ref 0.67–1.17)
EGFRCR SERPLBLD CKD-EPI 2021: >90 ML/MIN/1.73M2
FASTING STATUS PATIENT QL REPORTED: YES
GLUCOSE SERPL-MCNC: 119 MG/DL (ref 70–99)
HCO3 SERPL-SCNC: 16 MMOL/L (ref 22–29)
HDLC SERPL-MCNC: 35 MG/DL
LDLC SERPL CALC-MCNC: 73 MG/DL
NONHDLC SERPL-MCNC: 92 MG/DL
POTASSIUM SERPL-SCNC: 4.3 MMOL/L (ref 3.4–5.3)
SODIUM SERPL-SCNC: 138 MMOL/L (ref 135–145)
TRIGL SERPL-MCNC: 96 MG/DL

## 2024-07-16 PROCEDURE — 36415 COLL VENOUS BLD VENIPUNCTURE: CPT | Performed by: INTERNAL MEDICINE

## 2024-07-16 PROCEDURE — 84460 ALANINE AMINO (ALT) (SGPT): CPT | Performed by: INTERNAL MEDICINE

## 2024-07-16 PROCEDURE — 99207 PR NO CHARGE NURSE ONLY: CPT | Performed by: INTERNAL MEDICINE

## 2024-07-16 PROCEDURE — 80061 LIPID PANEL: CPT | Performed by: INTERNAL MEDICINE

## 2024-07-16 PROCEDURE — 80048 BASIC METABOLIC PNL TOTAL CA: CPT | Performed by: INTERNAL MEDICINE

## 2024-07-16 NOTE — PROGRESS NOTES
[facility] BLOOD PRESSURE CHECK     Last office visit: 5/15/24    Previous blood pressure: 146/82 mm Hg  Previous heart rate: 64 bpm      Time of visit: 10:00am    Morning medications were taken at: 8:30am     Today's blood pressure: 121/79 mm Hg  Today's heart rate: 71 bpm     Home monitor blood pressure: n/a mmHg  Home monitor heart rate: n/a bpm      Additional Comments: N/A      Results routed to: Team 2      Ordering Provider: Dr. Hurley  In clinic Provider: Dr. Hamilton

## 2024-07-17 NOTE — PROGRESS NOTES
Brandon Hurley MD Ashenmacher, Megan, RN14 hours ago (6:15 PM)     Looks good.  Thanks       BMP/FLP/ALT were also done with BP check. Per Dr Hurley's review-       Brandon Hurley MD  [facility] Team 2  Fasting lipid profile is improved.  Has he been taking his medicines consistently.  He needs to exercise regularly, eat a predominantly plant-based diet and try to lose weight.  Otherwise follow-up in 1 year        Left a message for patient to call back.

## 2024-07-18 NOTE — PROGRESS NOTES
Spoke to patient, reviewed labs and message from Dr Hurley. He expressed understanding. He is working to cut back on his alcohol.

## 2024-12-23 DIAGNOSIS — I21.02 ST ELEVATION MYOCARDIAL INFARCTION INVOLVING LEFT ANTERIOR DESCENDING (LAD) CORONARY ARTERY (H): ICD-10-CM

## 2024-12-23 RX ORDER — ASPIRIN 81 MG/1
81 TABLET ORAL DAILY
Qty: 90 TABLET | Refills: 0 | Status: SHIPPED | OUTPATIENT
Start: 2024-12-23

## 2025-01-04 ENCOUNTER — HEALTH MAINTENANCE LETTER (OUTPATIENT)
Age: 65
End: 2025-01-04

## 2025-03-06 ENCOUNTER — APPOINTMENT (OUTPATIENT)
Dept: CT IMAGING | Facility: CLINIC | Age: 65
End: 2025-03-06
Attending: STUDENT IN AN ORGANIZED HEALTH CARE EDUCATION/TRAINING PROGRAM
Payer: COMMERCIAL

## 2025-03-06 ENCOUNTER — HOSPITAL ENCOUNTER (EMERGENCY)
Facility: CLINIC | Age: 65
Discharge: HOME | End: 2025-03-06
Attending: STUDENT IN AN ORGANIZED HEALTH CARE EDUCATION/TRAINING PROGRAM
Payer: COMMERCIAL

## 2025-03-06 VITALS
SYSTOLIC BLOOD PRESSURE: 129 MMHG | HEART RATE: 72 BPM | BODY MASS INDEX: 31.12 KG/M2 | RESPIRATION RATE: 15 BRPM | HEIGHT: 74 IN | DIASTOLIC BLOOD PRESSURE: 65 MMHG | WEIGHT: 242.51 LBS | TEMPERATURE: 98.7 F | OXYGEN SATURATION: 95 %

## 2025-03-06 DIAGNOSIS — H11.32 SUBCONJUNCTIVAL HEMORRHAGE OF LEFT EYE: ICD-10-CM

## 2025-03-06 DIAGNOSIS — R55 SYNCOPE AND COLLAPSE: ICD-10-CM
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
## 2025-03-06 DIAGNOSIS — H11.422 CHEMOSIS OF LEFT CONJUNCTIVA: ICD-10-CM

## 2025-03-06 DIAGNOSIS — S01.112A LEFT EYELID LACERATION, INITIAL ENCOUNTER: ICD-10-CM

## 2025-03-06 DIAGNOSIS — S00.81XA ABRASION OF FACE, INITIAL ENCOUNTER: ICD-10-CM

## 2025-03-06 DIAGNOSIS — R55 VASOVAGAL SYNCOPE: ICD-10-CM

## 2025-03-06 DIAGNOSIS — S09.93XA FACIAL INJURY, INITIAL ENCOUNTER: Primary | ICD-10-CM
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
## 2025-03-06 DIAGNOSIS — S00.83XA FACIAL CONTUSION, INITIAL ENCOUNTER: ICD-10-CM

## 2025-03-06 LAB
ANION GAP SERPL CALCULATED.3IONS-SCNC: 15 MMOL/L (ref 7–15)
ATRIAL RATE - MUSE: 80 BPM
BASOPHILS # BLD AUTO: 0 10E3/UL (ref 0–0.2)
BASOPHILS NFR BLD AUTO: 1 %
BUN SERPL-MCNC: 23.9 MG/DL (ref 8–23)
CALCIUM SERPL-MCNC: 9.5 MG/DL (ref 8.8–10.4)
CHLORIDE SERPL-SCNC: 98 MMOL/L (ref 98–107)
CREAT SERPL-MCNC: 0.92 MG/DL (ref 0.67–1.17)
DIASTOLIC BLOOD PRESSURE - MUSE: NORMAL MMHG
EGFRCR SERPLBLD CKD-EPI 2021: >90 ML/MIN/1.73M2
EOSINOPHIL # BLD AUTO: 0.2 10E3/UL (ref 0–0.7)
EOSINOPHIL NFR BLD AUTO: 2 %
ERYTHROCYTE [DISTWIDTH] IN BLOOD BY AUTOMATED COUNT: 11.8 % (ref 10–15)
GLUCOSE SERPL-MCNC: 173 MG/DL (ref 70–99)
HCO3 SERPL-SCNC: 21 MMOL/L (ref 22–29)
HCT VFR BLD AUTO: 44.8 % (ref 40–53)
HGB BLD-MCNC: 16 G/DL (ref 13.3–17.7)
HOLD SPECIMEN: NORMAL
HOLD SPECIMEN: NORMAL
IMM GRANULOCYTES # BLD: 0 10E3/UL
IMM GRANULOCYTES NFR BLD: 0 %
INTERPRETATION ECG - MUSE: NORMAL
LYMPHOCYTES # BLD AUTO: 2 10E3/UL (ref 0.8–5.3)
LYMPHOCYTES NFR BLD AUTO: 28 %
MCH RBC QN AUTO: 34.6 PG (ref 26.5–33)
MCHC RBC AUTO-ENTMCNC: 35.7 G/DL (ref 31.5–36.5)
MCV RBC AUTO: 97 FL (ref 78–100)
MONOCYTES # BLD AUTO: 0.5 10E3/UL (ref 0–1.3)
MONOCYTES NFR BLD AUTO: 7 %
NEUTROPHILS # BLD AUTO: 4.5 10E3/UL (ref 1.6–8.3)
NEUTROPHILS NFR BLD AUTO: 63 %
NRBC # BLD AUTO: 0 10E3/UL
NRBC BLD AUTO-RTO: 0 /100
P AXIS - MUSE: 51 DEGREES
PLATELET # BLD AUTO: 148 10E3/UL (ref 150–450)
POTASSIUM SERPL-SCNC: 3.9 MMOL/L (ref 3.4–5.3)
PR INTERVAL - MUSE: 160 MS
QRS DURATION - MUSE: 98 MS
QT - MUSE: 388 MS
QTC - MUSE: 447 MS
R AXIS - MUSE: 32 DEGREES
RBC # BLD AUTO: 4.63 10E6/UL (ref 4.4–5.9)
SODIUM SERPL-SCNC: 134 MMOL/L (ref 135–145)
SYSTOLIC BLOOD PRESSURE - MUSE: NORMAL MMHG
T AXIS - MUSE: 28 DEGREES
TROPONIN T SERPL HS-MCNC: 8 NG/L
TROPONIN T SERPL HS-MCNC: 9 NG/L
VENTRICULAR RATE- MUSE: 80 BPM
WBC # BLD AUTO: 7.1 10E3/UL (ref 4–11)

## 2025-03-06 PROCEDURE — 12011 RPR F/E/E/N/L/M 2.5 CM/<: CPT | Mod: LT

## 2025-03-06 PROCEDURE — 84132 ASSAY OF SERUM POTASSIUM: CPT | Performed by: STUDENT IN AN ORGANIZED HEALTH CARE EDUCATION/TRAINING PROGRAM

## 2025-03-06 PROCEDURE — 93005 ELECTROCARDIOGRAM TRACING: CPT

## 2025-03-06 PROCEDURE — 70450 CT HEAD/BRAIN W/O DYE: CPT

## 2025-03-06 PROCEDURE — 84484 ASSAY OF TROPONIN QUANT: CPT | Performed by: STUDENT IN AN ORGANIZED HEALTH CARE EDUCATION/TRAINING PROGRAM

## 2025-03-06 PROCEDURE — 85025 COMPLETE CBC W/AUTO DIFF WBC: CPT | Performed by: STUDENT IN AN ORGANIZED HEALTH CARE EDUCATION/TRAINING PROGRAM

## 2025-03-06 PROCEDURE — 80048 BASIC METABOLIC PNL TOTAL CA: CPT | Performed by: STUDENT IN AN ORGANIZED HEALTH CARE EDUCATION/TRAINING PROGRAM

## 2025-03-06 PROCEDURE — 70486 CT MAXILLOFACIAL W/O DYE: CPT

## 2025-03-06 PROCEDURE — 99285 EMERGENCY DEPT VISIT HI MDM: CPT | Mod: 25

## 2025-03-06 PROCEDURE — 36415 COLL VENOUS BLD VENIPUNCTURE: CPT | Performed by: STUDENT IN AN ORGANIZED HEALTH CARE EDUCATION/TRAINING PROGRAM

## 2025-03-06 RX ORDER — TETRACAINE HYDROCHLORIDE 5 MG/ML
2 SOLUTION OPHTHALMIC ONCE
Status: DISCONTINUED | OUTPATIENT
Start: 2025-03-06 | End: 2025-03-06 | Stop reason: HOSPADM

## 2025-03-06 RX ORDER — LIDOCAINE HYDROCHLORIDE 10 MG/ML
5 INJECTION, SOLUTION EPIDURAL; INFILTRATION; INTRACAUDAL; PERINEURAL ONCE
Status: DISCONTINUED | OUTPATIENT
Start: 2025-03-06 | End: 2025-03-06 | Stop reason: HOSPADM

## 2025-03-06 RX ORDER — OXYCODONE HYDROCHLORIDE 5 MG/1
5 TABLET ORAL EVERY 6 HOURS PRN
Qty: 6 TABLET | Refills: 0 | Status: SHIPPED | OUTPATIENT
Start: 2025-03-06 | End: 2025-03-09

## 2025-03-06 ASSESSMENT — VISUAL ACUITY
OU: 1
OS: 20/25;WITHOUT CORRECTIVE LENSES
OU: NORMAL
OU: NORMAL
OD: 20/25;WITHOUT CORRECTIVE LENSES
OD: 20/25;WITHOUT CORRECTIVE LENSES
OU: 20/20;WITHOUT CORRECTIVE LENSES
OS: 20/25;WITHOUT CORRECTIVE LENSES
OU: 20/20;WITHOUT CORRECTIVE LENSES

## 2025-03-06 ASSESSMENT — ACTIVITIES OF DAILY LIVING (ADL)
ADLS_ACUITY_SCORE: 41

## 2025-03-06 ASSESSMENT — TONOMETRY
IOP_HANDHELD: 1
OS_IOP_MMHG: 19
OD_IOP_MMHG: 18

## 2025-03-06 NOTE — ED TRIAGE NOTES
Pt brought in by private car for Fall w/facial trauma, + LOC, denies blood thinners, denies neck pain, endorses L vision loss post fall w/obvious trauma around the L eye; c/o pain jaw, lips, L eye, denies back pain/spinal pain.

## 2025-03-06 NOTE — ED PROVIDER NOTES
"  Emergency Department Note      History of Present Illness     Chief Complaint   Syncope and facial injury    HPI   Bowen Tipton is a very pleasant 64 year old male with history of CAD, previous MI, hypertension, hyperlipidemia, presenting with syncope, fall, facial injury.  Shortly before arrival, patient had an episode of significant coughing.  This caused him to pass out.  He has had episodes of passing out in the past when coughing, numerous times.  When he passed out, he fell forwards and hit his face on the countertop.  He has pain around the left eye.  He had some initial blurry vision out of the left eye.  This now seems to be resolving.  No neck pain.  No chest pain, shortness of breath, palpitations.  No numbness or tingling or weakness in upper extremities.  No use of blood thinners.    Independent Historian   None    Review of External Notes   None.    Past Medical History     Medical History and Problem List   HDL deficiency  Esophageal reflux  Hyperlipidemia  Insomnia  STEMI  Class one obesity  Impaired fasting glucose  Hypertension    Medications   Zetia  Zestril  Toprol  Nitrostat  Crestor    Surgical History   HC left heart catheterization    Physical Exam     Patient Vitals for the past 24 hrs:   BP Temp Temp src Pulse Resp SpO2 Height Weight   03/06/25 2015 129/65 -- -- 72 15 95 % -- --   03/06/25 1945 130/76 -- -- 75 20 94 % -- --   03/06/25 1930 131/72 -- -- 75 17 96 % -- --   03/06/25 1915 128/78 -- -- 74 15 96 % -- --   03/06/25 1900 123/81 -- -- 75 17 95 % -- --   03/06/25 1845 125/83 -- -- 81 14 95 % -- --   03/06/25 1815 122/75 -- -- 77 12 95 % -- --   03/06/25 1802 (!) 144/82 98.7  F (37.1  C) Oral 80 18 96 % -- --   03/06/25 1757 -- -- -- -- -- -- 1.88 m (6' 2\") 110 kg (242 lb 8.1 oz)     Physical Exam  Vitals and nursing note reviewed.   Constitutional:       Appearance: Normal appearance. He is not ill-appearing or diaphoretic.   HENT:      Head: Abrasion, left periorbital erythema " and laceration present. No raccoon eyes, Finley's sign or contusion.     Eyes:      General: Vision grossly intact. Gaze aligned appropriately.         Left eye: No foreign body.      Intraocular pressure: Right eye pressure is 18 mmHg. Left eye pressure is 19 mmHg. Measurements were taken using a handheld tonometer.     Extraocular Movements: Extraocular movements intact.      Conjunctiva/sclera:      Left eye: Chemosis and hemorrhage present.      Pupils: Pupils are equal, round, and reactive to light.      Left eye: No corneal abrasion or fluorescein uptake. Ron exam negative.     Slit lamp exam:     Left eye: No hyphema.      Visual Fields: Left eye visual fields normal.     Cardiovascular:      Rate and Rhythm: Normal rate.   Pulmonary:      Effort: Pulmonary effort is normal.   Musculoskeletal:         General: No swelling, tenderness or signs of injury. Normal range of motion.      Cervical back: Normal range of motion. No tenderness.   Neurological:      Mental Status: He is alert.           Diagnostics     Lab Results   Labs Ordered and Resulted from Time of ED Arrival to Time of ED Departure   BASIC METABOLIC PANEL - Abnormal       Result Value    Sodium 134 (*)     Potassium 3.9      Chloride 98      Carbon Dioxide (CO2) 21 (*)     Anion Gap 15      Urea Nitrogen 23.9 (*)     Creatinine 0.92      GFR Estimate >90      Calcium 9.5      Glucose 173 (*)    CBC WITH PLATELETS AND DIFFERENTIAL - Abnormal    WBC Count 7.1      RBC Count 4.63      Hemoglobin 16.0      Hematocrit 44.8      MCV 97      MCH 34.6 (*)     MCHC 35.7      RDW 11.8      Platelet Count 148 (*)     % Neutrophils 63      % Lymphocytes 28      % Monocytes 7      % Eosinophils 2      % Basophils 1      % Immature Granulocytes 0      NRBCs per 100 WBC 0      Absolute Neutrophils 4.5      Absolute Lymphocytes 2.0      Absolute Monocytes 0.5      Absolute Eosinophils 0.2      Absolute Basophils 0.0      Absolute Immature Granulocytes 0.0       Absolute NRBCs 0.0     TROPONIN T, HIGH SENSITIVITY - Normal    Troponin T, High Sensitivity 9     TROPONIN T, HIGH SENSITIVITY - Normal    Troponin T, High Sensitivity 8         Imaging   CT Head w/o Contrast   Final Result   IMPRESSION:     1.  No evidence of acute intracranial hemorrhage or mass effect.      CT Facial Bones without Contrast   Final Result   IMPRESSION:    1.  Age-indeterminate right lamina papyracea fracture, though favored to be chronic.   2.  No other evidence of acute maxillofacial fracture by CT imaging.   3.  Left cheek soft tissue swelling and hematoma.             EKG   ECG taken at 1759, ECG read at 1800  Normal sinus rhythm  Normal ECG   Rate 80 bpm. VT interval 160 ms. QRS duration 98 ms. QT/QTc 388/447 ms. P-R-T axes 51 32 28.     Independent Interpretation   CT Head: No intracranial hemorrhage.      ED Course      Medications Administered   Medications - No data to display    Procedures   Procedures     Laceration Repair      Procedure: Laceration Repair    Indication: Laceration    Consent: Verbal    Tetanus status reviewed 11/15/23    Location: Left Eyelid    Length: 1.6 cm    Preparation: Irrigation with Sterile Saline.    Anesthesia/Sedation: None      Treatment/Exploration: Wound explored, no foreign bodies found     Closure: The wound was closed with Dermabond.    Patient Status: The patient tolerated the procedure well: Yes. There were no complications.    Discussion of Management   None      ED Course   ED Course as of 03/06/25 2324   Thu Mar 06, 2025   1752 I obtained history and examined the patient as noted above.        Additional Documentation  None    Medical Decision Making / Diagnosis     CMS Diagnoses: None    MIPS       None    MDM   Patient presenting with fall, syncope, facial trauma.  Vital signs on arrival are reassuring.    Given mechanism, CT of the head and facial bones were obtained.  These were reassuring against intracranial hemorrhage or facial bone  fracture.    Close examination of the patient's left eye reveals soft conjunctival hemorrhage and chemosis.  There is normal intraocular pressure, no concern for retrobulbar hematoma, ruptured globe.  Examination does not reveal evidence of corneal abrasion or laceration.     I repaired a small laceration to the patient's left eyelid as noted above.    Regarding the patient's episode of syncope, suspect this is vasovagal episode given it was elicited by coughing and the patient has a history of the same.  No concerning features suggest cardiogenic syncope.  I did obtain screening labs, which are reassuring, with troponin within normal limits and stable on repeat.  EKG also shows no evidence of cardiac arrhythmia or acute myocardial ischemia.  Familiarity with passing out with heavy coughing, I do not feel that further evaluation in the hospital is indicated at this time.  I recommend he follow-up with his primary care clinician for reevaluation as needed.    Return precautions provided.    Disposition   The patient was discharged.     Diagnosis     ICD-10-CM    1. Facial injury, initial encounter  S09.93XA Adult Eye  Referral      2. Vasovagal syncope  R55       3. Subconjunctival hemorrhage of left eye  H11.32 Adult Eye  Referral      4. Abrasion of face, initial encounter  S00.81XA       5. Chemosis of left conjunctiva  H11.422 Adult Eye  Referral      6. Facial contusion, initial encounter  S00.83XA       7. Fall at home, initial encounter  W19.XXXA     Y92.009       8. Syncope and collapse  R55       9. Left eyelid laceration, initial encounter  S01.112A            Discharge Medications   Discharge Medication List as of 3/6/2025  8:58 PM        START taking these medications    Details   oxyCODONE (ROXICODONE) 5 MG tablet Take 1 tablet (5 mg) by mouth every 6 hours as needed for severe pain., Disp-6 tablet, R-0, E-Prescribe              Chintan Shannon MD  03/06/25 4960

## 2025-03-07 LAB
ATRIAL RATE - MUSE: 80 BPM
DIASTOLIC BLOOD PRESSURE - MUSE: NORMAL MMHG
INTERPRETATION ECG - MUSE: NORMAL
P AXIS - MUSE: 51 DEGREES
PR INTERVAL - MUSE: 160 MS
QRS DURATION - MUSE: 98 MS
QT - MUSE: 388 MS
QTC - MUSE: 447 MS
R AXIS - MUSE: 32 DEGREES
SYSTOLIC BLOOD PRESSURE - MUSE: NORMAL MMHG
T AXIS - MUSE: 28 DEGREES
VENTRICULAR RATE- MUSE: 80 BPM

## 2025-03-07 NOTE — DISCHARGE INSTRUCTIONS
Thank you for allowing us to evaluate you today.  Follow up with primary care clinician  in 1 week for reevaluation. Follow-up with ophthalmology as needed for eye exam. Referral placed.    Use ice on your face tonight.  For pain, use acetaminophen (Tylenol) and ibuprofen.  Take the oxycodone as prescribed for breakthrough severe pain.   Please read the guidance provided with your discharge instructions.  Immediately return to the emergency department with any concerns.

## 2025-07-21 ENCOUNTER — TELEPHONE (OUTPATIENT)
Dept: CARDIOLOGY | Facility: CLINIC | Age: 65
End: 2025-07-21
Payer: COMMERCIAL

## 2025-07-21 DIAGNOSIS — I25.10 CORONARY ARTERY DISEASE INVOLVING NATIVE CORONARY ARTERY OF NATIVE HEART WITHOUT ANGINA PECTORIS: ICD-10-CM

## 2025-07-21 DIAGNOSIS — E78.2 MIXED HYPERLIPIDEMIA: ICD-10-CM

## 2025-07-21 RX ORDER — EZETIMIBE 10 MG/1
10 TABLET ORAL DAILY
Qty: 90 TABLET | Refills: 0 | Status: SHIPPED | OUTPATIENT
Start: 2025-07-21

## 2025-07-21 RX ORDER — METOPROLOL SUCCINATE 25 MG/1
25 TABLET, EXTENDED RELEASE ORAL DAILY
Qty: 90 TABLET | Refills: 0 | Status: SHIPPED | OUTPATIENT
Start: 2025-07-21

## 2025-07-21 NOTE — TELEPHONE ENCOUNTER
[facility] Call Center    Phone Message    May a detailed message be left on voicemail: yes     Reason for Call: Medication Refill Request    Has the patient contacted the pharmacy for the refill? Yes   Name of medication being requested: metoprolol succinate ER (TOPROL XL) 25 MG 24 hr tablet   Provider who prescribed the medication: Brandon Hurley MD   Pharmacy:    [id number] [place], [place] - [address] [facility] [address]  Date medication is needed: ASAP- he is out       Action Taken: Other: Cardiology    Travel Screening: Not Applicable     Date of Service:

## 2025-07-24 DIAGNOSIS — E78.2 MIXED HYPERLIPIDEMIA: Primary | ICD-10-CM

## 2025-07-24 DIAGNOSIS — I10 BENIGN ESSENTIAL HYPERTENSION: ICD-10-CM

## 2025-07-31 ENCOUNTER — TELEPHONE (OUTPATIENT)
Dept: CARDIOLOGY | Facility: CLINIC | Age: 65
End: 2025-07-31
Payer: COMMERCIAL

## 2025-07-31 NOTE — TELEPHONE ENCOUNTER
2nd attempt- Left voicemail for the patient to call back and schedule the following:    Appointment type:  Testing only- No clinic visit  Provider:  lab  Return date:  by 8/4  Additional appointment(s) needed:  LAB  Additional Notes:  pt needs fasting labs prior to 8/5, please schedule for no later than 8/4. Whichever lab pt prefers, [facility].  Specialty phone number: [phone]

## 2025-08-05 ENCOUNTER — OFFICE VISIT (OUTPATIENT)
Dept: CARDIOLOGY | Facility: CLINIC | Age: 65
End: 2025-08-05
Payer: COMMERCIAL

## 2025-08-05 ENCOUNTER — LAB (OUTPATIENT)
Dept: LAB | Facility: CLINIC | Age: 65
End: 2025-08-05
Payer: COMMERCIAL

## 2025-08-05 VITALS
SYSTOLIC BLOOD PRESSURE: 142 MMHG | HEIGHT: 72 IN | WEIGHT: 238 LBS | DIASTOLIC BLOOD PRESSURE: 78 MMHG | BODY MASS INDEX: 32.23 KG/M2 | HEART RATE: 63 BPM

## 2025-08-05 DIAGNOSIS — I10 BENIGN ESSENTIAL HYPERTENSION: ICD-10-CM

## 2025-08-05 DIAGNOSIS — I21.02 ST ELEVATION MYOCARDIAL INFARCTION INVOLVING LEFT ANTERIOR DESCENDING (LAD) CORONARY ARTERY (H): ICD-10-CM

## 2025-08-05 DIAGNOSIS — I25.10 CORONARY ARTERY DISEASE INVOLVING NATIVE CORONARY ARTERY OF NATIVE HEART WITHOUT ANGINA PECTORIS: ICD-10-CM

## 2025-08-05 DIAGNOSIS — E78.2 MIXED HYPERLIPIDEMIA: ICD-10-CM

## 2025-08-05 LAB
ALT SERPL W P-5'-P-CCNC: 132 U/L (ref 0–70)
ANION GAP SERPL CALCULATED.3IONS-SCNC: 13 MMOL/L (ref 7–15)
BUN SERPL-MCNC: 15.6 MG/DL (ref 8–23)
CALCIUM SERPL-MCNC: 8.6 MG/DL (ref 8.8–10.4)
CHLORIDE SERPL-SCNC: 102 MMOL/L (ref 98–107)
CHOLEST SERPL-MCNC: 106 MG/DL
CREAT SERPL-MCNC: 0.74 MG/DL (ref 0.67–1.17)
EGFRCR SERPLBLD CKD-EPI 2021: >90 ML/MIN/1.73M2
FASTING STATUS PATIENT QL REPORTED: YES
GLUCOSE SERPL-MCNC: 182 MG/DL (ref 70–99)
HCO3 SERPL-SCNC: 21 MMOL/L (ref 22–29)
HDLC SERPL-MCNC: 31 MG/DL
LDLC SERPL CALC-MCNC: 52 MG/DL
NONHDLC SERPL-MCNC: 75 MG/DL
POTASSIUM SERPL-SCNC: 3.9 MMOL/L (ref 3.4–5.3)
SODIUM SERPL-SCNC: 136 MMOL/L (ref 135–145)
TRIGL SERPL-MCNC: 115 MG/DL

## 2025-08-05 PROCEDURE — 3077F SYST BP >= 140 MM HG: CPT | Performed by: NURSE PRACTITIONER

## 2025-08-05 PROCEDURE — 36415 COLL VENOUS BLD VENIPUNCTURE: CPT | Performed by: NURSE PRACTITIONER

## 2025-08-05 PROCEDURE — 80048 BASIC METABOLIC PNL TOTAL CA: CPT | Performed by: NURSE PRACTITIONER

## 2025-08-05 PROCEDURE — 80061 LIPID PANEL: CPT | Performed by: NURSE PRACTITIONER

## 2025-08-05 PROCEDURE — 3078F DIAST BP <80 MM HG: CPT | Performed by: NURSE PRACTITIONER

## 2025-08-05 PROCEDURE — 99214 OFFICE O/P EST MOD 30 MIN: CPT | Performed by: NURSE PRACTITIONER

## 2025-08-05 PROCEDURE — 84460 ALANINE AMINO (ALT) (SGPT): CPT | Performed by: NURSE PRACTITIONER

## 2025-08-05 PROCEDURE — G2211 COMPLEX E/M VISIT ADD ON: HCPCS | Performed by: NURSE PRACTITIONER

## 2025-08-05 RX ORDER — METOPROLOL SUCCINATE 25 MG/1
25 TABLET, EXTENDED RELEASE ORAL DAILY
Qty: 90 TABLET | Refills: 3 | Status: SHIPPED | OUTPATIENT
Start: 2025-08-05

## 2025-08-05 RX ORDER — ROSUVASTATIN CALCIUM 40 MG/1
40 TABLET, COATED ORAL DAILY
Qty: 90 TABLET | Refills: 3 | Status: SHIPPED | OUTPATIENT
Start: 2025-08-05 | End: 2025-08-05

## 2025-08-05 RX ORDER — EZETIMIBE 10 MG/1
10 TABLET ORAL DAILY
Qty: 90 TABLET | Refills: 3 | Status: SHIPPED | OUTPATIENT
Start: 2025-08-05

## 2025-08-05 RX ORDER — ASPIRIN 81 MG/1
81 TABLET ORAL DAILY
Qty: 90 TABLET | Refills: 3 | Status: SHIPPED | OUTPATIENT
Start: 2025-08-05

## 2025-08-05 RX ORDER — LISINOPRIL 10 MG/1
10 TABLET ORAL DAILY
Qty: 90 TABLET | Refills: 3 | Status: SHIPPED | OUTPATIENT
Start: 2025-08-05

## (undated) RX ORDER — FENTANYL CITRATE 50 UG/ML
INJECTION, SOLUTION INTRAMUSCULAR; INTRAVENOUS
Status: DISPENSED
Start: 2017-07-22

## (undated) RX ORDER — VERAPAMIL HYDROCHLORIDE 2.5 MG/ML
INJECTION, SOLUTION INTRAVENOUS
Status: DISPENSED
Start: 2017-07-22

## (undated) RX ORDER — NITROGLYCERIN 5 MG/ML
VIAL (ML) INTRAVENOUS
Status: DISPENSED
Start: 2017-07-22

## (undated) RX ORDER — HEPARIN SODIUM 1000 [USP'U]/ML
INJECTION, SOLUTION INTRAVENOUS; SUBCUTANEOUS
Status: DISPENSED
Start: 2017-07-22